# Patient Record
Sex: FEMALE | Race: WHITE | NOT HISPANIC OR LATINO
[De-identification: names, ages, dates, MRNs, and addresses within clinical notes are randomized per-mention and may not be internally consistent; named-entity substitution may affect disease eponyms.]

---

## 2016-10-11 RX ORDER — ASPIRIN/CALCIUM CARB/MAGNESIUM 324 MG
1 TABLET ORAL
Qty: 0 | Refills: 0 | COMMUNITY
Start: 2016-10-11

## 2016-10-11 RX ORDER — IPRATROPIUM/ALBUTEROL SULFATE 18-103MCG
1 AEROSOL WITH ADAPTER (GRAM) INHALATION
Qty: 0 | Refills: 0 | COMMUNITY
Start: 2016-10-11

## 2017-04-19 ENCOUNTER — APPOINTMENT (OUTPATIENT)
Dept: HOME HEALTH SERVICES | Facility: HOME HEALTH | Age: 82
End: 2017-04-19

## 2017-04-19 VITALS
OXYGEN SATURATION: 94 % | WEIGHT: 167 LBS | DIASTOLIC BLOOD PRESSURE: 80 MMHG | RESPIRATION RATE: 16 BRPM | TEMPERATURE: 97.3 F | BODY MASS INDEX: 33.67 KG/M2 | HEIGHT: 59 IN | SYSTOLIC BLOOD PRESSURE: 200 MMHG | HEART RATE: 82 BPM

## 2017-04-19 DIAGNOSIS — Z99.3 DEPENDENCE ON WHEELCHAIR: ICD-10-CM

## 2017-04-19 DIAGNOSIS — Z99.81 DEPENDENCE ON SUPPLEMENTAL OXYGEN: ICD-10-CM

## 2017-04-19 DIAGNOSIS — R04.89 HEMORRHAGE FROM OTHER SITES IN RESPIRATORY PASSAGES: ICD-10-CM

## 2017-04-19 DIAGNOSIS — K21.9 GASTRO-ESOPHAGEAL REFLUX DISEASE W/OUT ESOPHAGITIS: ICD-10-CM

## 2017-04-19 DIAGNOSIS — I25.10 ATHEROSCLEROTIC HEART DISEASE OF NATIVE CORONARY ARTERY W/OUT ANGINA PECTORIS: ICD-10-CM

## 2017-04-19 DIAGNOSIS — Z87.891 PERSONAL HISTORY OF NICOTINE DEPENDENCE: ICD-10-CM

## 2017-04-19 RX ORDER — CEFPODOXIME PROXETIL 200 MG/1
200 TABLET, FILM COATED ORAL
Qty: 5 | Refills: 0 | Status: COMPLETED | COMMUNITY
Start: 2017-04-15

## 2017-04-19 RX ORDER — PREDNISONE 10 MG/1
10 TABLET ORAL
Qty: 30 | Refills: 0 | Status: DISCONTINUED | COMMUNITY
Start: 2017-04-15

## 2017-04-19 RX ORDER — AMLODIPINE BESYLATE 5 MG/1
5 TABLET ORAL
Qty: 30 | Refills: 0 | Status: COMPLETED | COMMUNITY
Start: 2017-04-15

## 2017-04-19 RX ORDER — WARFARIN 3 MG/1
3 TABLET ORAL
Qty: 30 | Refills: 0 | Status: DISCONTINUED | COMMUNITY
Start: 2017-01-06

## 2017-04-19 RX ORDER — GUAIFENESIN 100 MG/5ML
100 SOLUTION ORAL
Qty: 120 | Refills: 0 | Status: DISCONTINUED | COMMUNITY
Start: 2017-04-15

## 2017-04-19 RX ORDER — IPRATROPIUM BROMIDE AND ALBUTEROL SULFATE 2.5; .5 MG/3ML; MG/3ML
0.5-2.5 (3) SOLUTION RESPIRATORY (INHALATION)
Qty: 270 | Refills: 0 | Status: COMPLETED | COMMUNITY
Start: 2017-01-06

## 2017-04-20 ENCOUNTER — LABORATORY RESULT (OUTPATIENT)
Age: 82
End: 2017-04-20

## 2017-04-20 PROBLEM — K21.9 GASTROESOPHAGEAL REFLUX DISEASE, ESOPHAGITIS PRESENCE NOT SPECIFIED: Status: ACTIVE | Noted: 2017-04-19

## 2017-04-20 PROBLEM — I25.10 CAD, MULTIPLE VESSEL: Status: ACTIVE | Noted: 2017-04-19

## 2017-04-24 ENCOUNTER — RESULT REVIEW (OUTPATIENT)
Age: 82
End: 2017-04-24

## 2017-05-04 ENCOUNTER — LABORATORY RESULT (OUTPATIENT)
Age: 82
End: 2017-05-04

## 2017-05-08 RX ORDER — BLOOD-GLUCOSE METER
W/DEVICE EACH MISCELLANEOUS
Qty: 1 | Refills: 0 | Status: ACTIVE | COMMUNITY
Start: 2017-05-08 | End: 1900-01-01

## 2017-05-09 ENCOUNTER — MEDICATION RENEWAL (OUTPATIENT)
Age: 82
End: 2017-05-09

## 2017-05-09 ENCOUNTER — RX RENEWAL (OUTPATIENT)
Age: 82
End: 2017-05-09

## 2017-05-15 ENCOUNTER — LABORATORY RESULT (OUTPATIENT)
Age: 82
End: 2017-05-15

## 2017-05-15 ENCOUNTER — CLINICAL ADVICE (OUTPATIENT)
Age: 82
End: 2017-05-15

## 2017-05-23 ENCOUNTER — RX RENEWAL (OUTPATIENT)
Age: 82
End: 2017-05-23

## 2017-05-25 LAB
ANION GAP SERPL CALC-SCNC: 17 MMOL/L
BUN SERPL-MCNC: 12 MG/DL
CALCIUM SERPL-MCNC: 9.2 MG/DL
CHLORIDE SERPL-SCNC: 96 MMOL/L
CO2 SERPL-SCNC: 26 MMOL/L
CREAT SERPL-MCNC: 1.25 MG/DL
GLUCOSE SERPL-MCNC: 183 MG/DL
INR PPP: 1.72 RATIO
POTASSIUM SERPL-SCNC: 3.8 MMOL/L
PT BLD: 19.6 SEC
SODIUM SERPL-SCNC: 139 MMOL/L

## 2017-05-26 ENCOUNTER — APPOINTMENT (OUTPATIENT)
Dept: HOME HEALTH SERVICES | Facility: HOME HEALTH | Age: 82
End: 2017-05-26

## 2017-05-26 VITALS
RESPIRATION RATE: 16 BRPM | SYSTOLIC BLOOD PRESSURE: 132 MMHG | DIASTOLIC BLOOD PRESSURE: 82 MMHG | OXYGEN SATURATION: 92 % | HEART RATE: 87 BPM | TEMPERATURE: 97.7 F

## 2017-05-26 DIAGNOSIS — E03.9 HYPOTHYROIDISM, UNSPECIFIED: ICD-10-CM

## 2017-05-26 DIAGNOSIS — E78.5 HYPERLIPIDEMIA, UNSPECIFIED: ICD-10-CM

## 2017-06-01 ENCOUNTER — LABORATORY RESULT (OUTPATIENT)
Age: 82
End: 2017-06-01

## 2017-06-15 ENCOUNTER — LABORATORY RESULT (OUTPATIENT)
Age: 82
End: 2017-06-15

## 2017-06-17 ENCOUNTER — RX RENEWAL (OUTPATIENT)
Age: 82
End: 2017-06-17

## 2017-06-29 ENCOUNTER — MEDICATION RENEWAL (OUTPATIENT)
Age: 82
End: 2017-06-29

## 2017-06-30 LAB
INR PPP: 1.84 RATIO
PT BLD: 21.1 SEC

## 2017-07-11 ENCOUNTER — APPOINTMENT (OUTPATIENT)
Dept: HOME HEALTH SERVICES | Facility: HOME HEALTH | Age: 82
End: 2017-07-11

## 2017-07-11 VITALS
SYSTOLIC BLOOD PRESSURE: 130 MMHG | RESPIRATION RATE: 18 BRPM | DIASTOLIC BLOOD PRESSURE: 80 MMHG | OXYGEN SATURATION: 86 % | TEMPERATURE: 98.9 F | HEART RATE: 92 BPM

## 2017-07-11 RX ORDER — LANCETS
EACH MISCELLANEOUS
Qty: 180 | Refills: 5 | Status: DISCONTINUED | COMMUNITY
Start: 2017-05-09 | End: 2017-07-11

## 2017-07-11 RX ORDER — LANCETS 33 GAUGE
EACH MISCELLANEOUS
Qty: 4 | Refills: 11 | Status: ACTIVE | COMMUNITY
Start: 2017-07-11 | End: 1900-01-01

## 2017-07-11 RX ORDER — LANCETS
EACH MISCELLANEOUS
Qty: 120 | Refills: 11 | Status: DISCONTINUED | COMMUNITY
Start: 2017-05-12 | End: 2017-07-11

## 2017-07-11 RX ORDER — BLOOD SUGAR DIAGNOSTIC
STRIP MISCELLANEOUS
Qty: 4 | Refills: 11 | Status: ACTIVE | COMMUNITY
Start: 2017-05-09 | End: 1900-01-01

## 2017-07-14 ENCOUNTER — MEDICATION RENEWAL (OUTPATIENT)
Age: 82
End: 2017-07-14

## 2017-07-14 LAB
INR PPP: 3.32 RATIO
PT BLD: 38.4 SEC

## 2017-07-27 ENCOUNTER — LABORATORY RESULT (OUTPATIENT)
Age: 82
End: 2017-07-27

## 2017-07-28 ENCOUNTER — APPOINTMENT (OUTPATIENT)
Dept: HOME HEALTH SERVICES | Facility: HOME HEALTH | Age: 82
End: 2017-07-28
Payer: MEDICARE

## 2017-07-28 ENCOUNTER — RX RENEWAL (OUTPATIENT)
Age: 82
End: 2017-07-28

## 2017-07-28 VITALS
TEMPERATURE: 98.5 F | RESPIRATION RATE: 20 BRPM | DIASTOLIC BLOOD PRESSURE: 72 MMHG | HEART RATE: 83 BPM | OXYGEN SATURATION: 81 % | SYSTOLIC BLOOD PRESSURE: 142 MMHG

## 2017-07-28 VITALS — OXYGEN SATURATION: 92 %

## 2017-07-28 DIAGNOSIS — J44.1 CHRONIC OBSTRUCTIVE PULMONARY DISEASE WITH (ACUTE) EXACERBATION: ICD-10-CM

## 2017-07-28 PROCEDURE — 99349 HOME/RES VST EST MOD MDM 40: CPT

## 2017-08-02 ENCOUNTER — LABORATORY RESULT (OUTPATIENT)
Age: 82
End: 2017-08-02

## 2017-08-10 ENCOUNTER — LABORATORY RESULT (OUTPATIENT)
Age: 82
End: 2017-08-10

## 2017-09-07 LAB
INR PPP: 2.91 RATIO
PT BLD: 33.6 SEC

## 2017-09-19 ENCOUNTER — APPOINTMENT (OUTPATIENT)
Dept: HOME HEALTH SERVICES | Facility: HOME HEALTH | Age: 82
End: 2017-09-19
Payer: MEDICARE

## 2017-09-19 VITALS
OXYGEN SATURATION: 97 % | SYSTOLIC BLOOD PRESSURE: 142 MMHG | DIASTOLIC BLOOD PRESSURE: 72 MMHG | TEMPERATURE: 98 F | HEART RATE: 76 BPM | RESPIRATION RATE: 18 BRPM

## 2017-09-19 DIAGNOSIS — R39.81 FUNCTIONAL URINARY INCONTINENCE: ICD-10-CM

## 2017-09-19 PROCEDURE — 99350 HOME/RES VST EST HIGH MDM 60: CPT

## 2017-09-19 RX ORDER — ISOPROPYL ALCOHOL 70 %
TOWELETTE (EA) MISCELLANEOUS
Qty: 1 | Refills: 11 | Status: ACTIVE | COMMUNITY
Start: 2017-05-08

## 2017-09-27 ENCOUNTER — MOBILE ON CALL (OUTPATIENT)
Age: 82
End: 2017-09-27

## 2017-09-27 LAB
INR PPP: 3.25 RATIO
PT BLD: 37.6 SEC

## 2017-09-28 ENCOUNTER — CLINICAL ADVICE (OUTPATIENT)
Age: 82
End: 2017-09-28

## 2017-09-28 DIAGNOSIS — R19.7 DIARRHEA, UNSPECIFIED: ICD-10-CM

## 2017-10-04 ENCOUNTER — INPATIENT (INPATIENT)
Facility: HOSPITAL | Age: 82
LOS: 5 days | Discharge: HOME | DRG: 291 | End: 2017-10-10
Attending: INTERNAL MEDICINE | Admitting: HOSPITALIST
Payer: MEDICARE

## 2017-10-04 ENCOUNTER — CLINICAL ADVICE (OUTPATIENT)
Age: 82
End: 2017-10-04

## 2017-10-04 VITALS
SYSTOLIC BLOOD PRESSURE: 156 MMHG | HEIGHT: 59 IN | HEART RATE: 117 BPM | DIASTOLIC BLOOD PRESSURE: 124 MMHG | TEMPERATURE: 100 F | OXYGEN SATURATION: 86 % | WEIGHT: 167.99 LBS | RESPIRATION RATE: 24 BRPM

## 2017-10-04 LAB
ALBUMIN SERPL ELPH-MCNC: 3.1 G/DL — LOW (ref 3.3–5)
ALP SERPL-CCNC: 77 U/L — SIGNIFICANT CHANGE UP (ref 40–120)
ALT FLD-CCNC: 11 U/L — LOW (ref 12–78)
AMYLASE P1 CFR SERPL: 40 U/L — SIGNIFICANT CHANGE UP (ref 25–115)
ANION GAP SERPL CALC-SCNC: 10 MMOL/L — SIGNIFICANT CHANGE UP (ref 5–17)
APPEARANCE UR: CLEAR — SIGNIFICANT CHANGE UP
APTT BLD: 40.1 SEC — HIGH (ref 27.5–37.4)
AST SERPL-CCNC: 14 U/L — LOW (ref 15–37)
BASE EXCESS BLDA CALC-SCNC: 1.3 MMOL/L — SIGNIFICANT CHANGE UP (ref -2–2)
BASOPHILS # BLD AUTO: 0.1 K/UL — SIGNIFICANT CHANGE UP (ref 0–0.2)
BASOPHILS NFR BLD AUTO: 1 % — SIGNIFICANT CHANGE UP (ref 0–2)
BILIRUB SERPL-MCNC: 0.7 MG/DL — SIGNIFICANT CHANGE UP (ref 0.2–1.2)
BILIRUB UR-MCNC: NEGATIVE — SIGNIFICANT CHANGE UP
BLOOD GAS COMMENTS ARTERIAL: SIGNIFICANT CHANGE UP
BLOOD GAS COMMENTS ARTERIAL: SIGNIFICANT CHANGE UP
BUN SERPL-MCNC: 12 MG/DL — SIGNIFICANT CHANGE UP (ref 7–23)
CALCIUM SERPL-MCNC: 8.5 MG/DL — SIGNIFICANT CHANGE UP (ref 8.5–10.1)
CHLORIDE SERPL-SCNC: 105 MMOL/L — SIGNIFICANT CHANGE UP (ref 96–108)
CK MB BLD-MCNC: 1.5 % — SIGNIFICANT CHANGE UP (ref 0–3.5)
CK MB CFR SERPL CALC: 0.8 NG/ML — SIGNIFICANT CHANGE UP (ref 0–3.6)
CK SERPL-CCNC: 55 U/L — SIGNIFICANT CHANGE UP (ref 26–192)
CO2 SERPL-SCNC: 28 MMOL/L — SIGNIFICANT CHANGE UP (ref 22–31)
COLOR SPEC: YELLOW — SIGNIFICANT CHANGE UP
CREAT SERPL-MCNC: 1 MG/DL — SIGNIFICANT CHANGE UP (ref 0.5–1.3)
DIFF PNL FLD: NEGATIVE — SIGNIFICANT CHANGE UP
EOSINOPHIL # BLD AUTO: 0.3 K/UL — SIGNIFICANT CHANGE UP (ref 0–0.5)
EOSINOPHIL NFR BLD AUTO: 3.4 % — SIGNIFICANT CHANGE UP (ref 0–6)
GLUCOSE SERPL-MCNC: 111 MG/DL — HIGH (ref 70–99)
GLUCOSE UR QL: NEGATIVE — SIGNIFICANT CHANGE UP
HCO3 BLDA-SCNC: 26 MMOL/L — SIGNIFICANT CHANGE UP (ref 23–27)
HCT VFR BLD CALC: 43.1 % — SIGNIFICANT CHANGE UP (ref 34.5–45)
HGB BLD-MCNC: 13.5 G/DL — SIGNIFICANT CHANGE UP (ref 11.5–15.5)
HOROWITZ INDEX BLDA+IHG-RTO: 40 — SIGNIFICANT CHANGE UP
INR BLD: 4.1 RATIO — HIGH (ref 0.88–1.16)
KETONES UR-MCNC: NEGATIVE — SIGNIFICANT CHANGE UP
LACTATE SERPL-SCNC: 1.5 MMOL/L — SIGNIFICANT CHANGE UP (ref 0.7–2)
LACTATE SERPL-SCNC: 3.6 MMOL/L — HIGH (ref 0.7–2)
LEUKOCYTE ESTERASE UR-ACNC: ABNORMAL
LIDOCAIN IGE QN: 87 U/L — SIGNIFICANT CHANGE UP (ref 73–393)
LYMPHOCYTES # BLD AUTO: 1.5 K/UL — SIGNIFICANT CHANGE UP (ref 1–3.3)
LYMPHOCYTES # BLD AUTO: 19.8 % — SIGNIFICANT CHANGE UP (ref 13–44)
MCHC RBC-ENTMCNC: 28.7 PG — SIGNIFICANT CHANGE UP (ref 27–34)
MCHC RBC-ENTMCNC: 31.3 GM/DL — LOW (ref 32–36)
MCV RBC AUTO: 91.7 FL — SIGNIFICANT CHANGE UP (ref 80–100)
MONOCYTES # BLD AUTO: 0.6 K/UL — SIGNIFICANT CHANGE UP (ref 0–0.9)
MONOCYTES NFR BLD AUTO: 7.2 % — SIGNIFICANT CHANGE UP (ref 1–9)
NEUTROPHILS # BLD AUTO: 5.4 K/UL — SIGNIFICANT CHANGE UP (ref 1.8–7.4)
NEUTROPHILS NFR BLD AUTO: 68.5 % — SIGNIFICANT CHANGE UP (ref 43–77)
NITRITE UR-MCNC: NEGATIVE — SIGNIFICANT CHANGE UP
NT-PROBNP SERPL-SCNC: 1269 PG/ML — HIGH (ref 0–450)
PCO2 BLDA: 33 MMHG — SIGNIFICANT CHANGE UP (ref 32–46)
PH BLDA: 7.48 — HIGH (ref 7.35–7.45)
PH UR: 5 — SIGNIFICANT CHANGE UP (ref 5–8)
PLATELET # BLD AUTO: 209 K/UL — SIGNIFICANT CHANGE UP (ref 150–400)
PO2 BLDA: 101 MMHG — SIGNIFICANT CHANGE UP (ref 74–108)
POTASSIUM SERPL-MCNC: 3.6 MMOL/L — SIGNIFICANT CHANGE UP (ref 3.5–5.3)
POTASSIUM SERPL-SCNC: 3.6 MMOL/L — SIGNIFICANT CHANGE UP (ref 3.5–5.3)
PROCALCITONIN SERPL-MCNC: <0.05 — SIGNIFICANT CHANGE UP (ref 0–0.04)
PROT SERPL-MCNC: 7.4 G/DL — SIGNIFICANT CHANGE UP (ref 6–8.3)
PROT UR-MCNC: NEGATIVE — SIGNIFICANT CHANGE UP
PROTHROM AB SERPL-ACNC: 46 SEC — HIGH (ref 9.8–12.7)
RBC # BLD: 4.7 M/UL — SIGNIFICANT CHANGE UP (ref 3.8–5.2)
RBC # FLD: 14.9 % — HIGH (ref 10.3–14.5)
SAO2 % BLDA: 98 % — HIGH (ref 92–96)
SODIUM SERPL-SCNC: 143 MMOL/L — SIGNIFICANT CHANGE UP (ref 135–145)
SP GR SPEC: 1.01 — SIGNIFICANT CHANGE UP (ref 1.01–1.02)
TROPONIN I SERPL-MCNC: 0.19 NG/ML — HIGH (ref 0.01–0.04)
UROBILINOGEN FLD QL: NEGATIVE — SIGNIFICANT CHANGE UP
WBC # BLD: 7.8 K/UL — SIGNIFICANT CHANGE UP (ref 3.8–10.5)
WBC # FLD AUTO: 7.8 K/UL — SIGNIFICANT CHANGE UP (ref 3.8–10.5)

## 2017-10-04 PROCEDURE — 71010: CPT | Mod: 26

## 2017-10-04 PROCEDURE — 99281 EMR DPT VST MAYX REQ PHY/QHP: CPT

## 2017-10-04 PROCEDURE — 93010 ELECTROCARDIOGRAM REPORT: CPT

## 2017-10-04 PROCEDURE — 99223 1ST HOSP IP/OBS HIGH 75: CPT | Mod: AI,GC

## 2017-10-04 RX ORDER — IPRATROPIUM/ALBUTEROL SULFATE 18-103MCG
3 AEROSOL WITH ADAPTER (GRAM) INHALATION ONCE
Qty: 0 | Refills: 0 | Status: COMPLETED | OUTPATIENT
Start: 2017-10-04 | End: 2017-10-04

## 2017-10-04 RX ORDER — METOPROLOL TARTRATE 50 MG
5 TABLET ORAL ONCE
Qty: 0 | Refills: 0 | Status: COMPLETED | OUTPATIENT
Start: 2017-10-04 | End: 2017-10-04

## 2017-10-04 RX ORDER — ASPIRIN/CALCIUM CARB/MAGNESIUM 324 MG
325 TABLET ORAL ONCE
Qty: 0 | Refills: 0 | Status: COMPLETED | OUTPATIENT
Start: 2017-10-04 | End: 2017-10-04

## 2017-10-04 RX ORDER — SODIUM CHLORIDE 9 MG/ML
500 INJECTION INTRAMUSCULAR; INTRAVENOUS; SUBCUTANEOUS ONCE
Qty: 0 | Refills: 0 | Status: COMPLETED | OUTPATIENT
Start: 2017-10-04 | End: 2017-10-04

## 2017-10-04 RX ORDER — FUROSEMIDE 40 MG
40 TABLET ORAL ONCE
Qty: 0 | Refills: 0 | Status: COMPLETED | OUTPATIENT
Start: 2017-10-04 | End: 2017-10-04

## 2017-10-04 RX ADMIN — Medication 5 MILLIGRAM(S): at 22:15

## 2017-10-04 RX ADMIN — Medication 125 MILLIGRAM(S): at 22:31

## 2017-10-04 RX ADMIN — Medication 0.5 MILLIGRAM(S): at 21:40

## 2017-10-04 RX ADMIN — Medication 0.5 MILLIGRAM(S): at 21:53

## 2017-10-04 RX ADMIN — Medication 3 MILLILITER(S): at 21:00

## 2017-10-04 RX ADMIN — Medication 40 MILLIGRAM(S): at 20:50

## 2017-10-04 RX ADMIN — SODIUM CHLORIDE 500 MILLILITER(S): 9 INJECTION INTRAMUSCULAR; INTRAVENOUS; SUBCUTANEOUS at 22:19

## 2017-10-04 RX ADMIN — Medication 325 MILLIGRAM(S): at 23:46

## 2017-10-04 NOTE — ED ADULT NURSE REASSESSMENT NOTE - NS ED NURSE REASSESS COMMENT FT1
pt came to ED SOB x days.   [id number] bilateral pedal edema present.   pt placed on bipap 10/5 40% continuous placed by respiratory STAT

## 2017-10-04 NOTE — ED PROVIDER NOTE - MEDICAL DECISION MAKING DETAILS
screening septic work up, BNP, INR, cardiac enzymes, d dimer, procalcitonin, lactate, ABG, UA, urine culture, EKG

## 2017-10-04 NOTE — ED PROVIDER NOTE - PMH
DVT (deep venous thrombosis)    Emphysema COPD (chronic obstructive pulmonary disease)    DVT (deep venous thrombosis)    Emphysema    HLD (hyperlipidemia)    HTN (hypertension)    Insulin dependent diabetes mellitus

## 2017-10-04 NOTE — H&P ADULT - FAMILY HISTORY
No pertinent family history in first degree relatives Father  Still living? Unknown  Family history of acute myocardial infarction, Age at diagnosis: Age Unknown

## 2017-10-04 NOTE — H&P ADULT - ASSESSMENT
This is an 81 y.o. 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 Admitted for ARDS, COPD exacerbation, acute CHF

## 2017-10-04 NOTE — H&P ADULT - NSHPREVIEWOFSYSTEMS_GEN_ALL_CORE
Constitutional: denies fever, chills, diaphoresis   HEENT: denies blurry vision, difficulty hearing  Respiratory: admits SOB, HIDALGO,  denies cough, sputum production  Cardiovascular: denies CP, palpitations, admits edema  Gastrointestinal: denies nausea, vomiting, diarrhea, constipation, abdominal pain, melena, hematochezia   Genitourinary: denies dysuria, frequency, urgency, hematuria   Skin/Breast: denies rash, itching  Musculoskeletal: denies myalgias, joint swelling, muscle weakness  Neurologic: admits weakness, denies headache, dizziness, paresthesias, numbness/tingling

## 2017-10-04 NOTE — H&P ADULT - NSHPPHYSICALEXAM_GEN_ALL_CORE
vitals were Temp: 99.8F   /124 RR 24  SpO2: 86% RA  Physical Exam:  General: currently on Bipap 40% O2, elderly white male, NAD  HEENT: NCAT, PERRLA, EOMI bl, moist mucous membranes   Neck: Supple, nontender, no mass  Neurology: A&Ox3  Respiratory: ELEN rhonchi, no coughing, currently on BiPap  CV: tachycardic, no murmurs, rubs or gallops  Abdominal: Soft, NT, ND +BSx4  Extremities: 2+ pitting edema b/l LE, + peripheral pulses  MSK: Normal ROM, no joint erythema or warmth, no joint swelling   Skin: warm, dry, normal color, no rash or abnormal lesions

## 2017-10-04 NOTE — H&P ADULT - NSHPSOCIALHISTORY_GEN_ALL_CORE
Former smoker, quit 2-3 years ago, used to smoke 1 pk/day for 60 years, denies ETOH use, illict drug use. Ambulates with assistance and lives with son

## 2017-10-04 NOTE — H&P ADULT - PROBLEM SELECTOR PLAN 8
H/o DVT x2, currently on warfarin at home but is supra therapeutic in hospital. Last dose was 10/3 PM.

## 2017-10-04 NOTE — ED PROVIDER NOTE - CARE PLAN
Principal Discharge DX:	Respiratory distress  Instructions for follow-up, activity and diet:	admit  Secondary Diagnosis:	COPD exacerbation  Secondary Diagnosis:	Congestive heart failure, unspecified congestive heart failure chronicity, unspecified congestive heart failure type

## 2017-10-04 NOTE — H&P ADULT - PROBLEM SELECTOR PLAN 1
-ADMIT to Tele  -Currently on Bipap, saturating well. Patient uses 3 L NC at home  -Duonebs q8  -Solum medrol  -Pulm consult (shilpa) -ADMIT to Tele  -Currently on BiPAP, saturating well. Patient uses 3 L NC at home  -Duoneb q8 prn  -Solum medrol 40 Q8  -Pulm consult (Williams)

## 2017-10-04 NOTE — H&P ADULT - ATTENDING COMMENTS
80 yo f pmh stated above being admitted for Respiratory distress, fever of unknown origin.  Continue BIPAP.  Pulmonary Dr. Kramer consulted.  Work up fever with RVP swab.  F/U cultures.  Pt currently feeling comfortable.  F/U echo.  Consider Cardiology consult

## 2017-10-04 NOTE — ED PROVIDER NOTE - OBJECTIVE STATEMENT
Pt is a 82 yo female who presents to the ED with a cc of SOB.  PMHx of DVT on Coumadin, COPD, HTN, HLD, DM, hypothyroidism, GERD.  ECHO from 7/16 shows EF of 60% with mild left ventricular hypertrophy.  Pt reports that for the last week and a half she has been experiencing SOB which has been progressively worsening.  Pt has been sleeping in a recliner in a sitting because laying flat significantly worsens her breathing.  Pt is on oxygen at 3 liters NC continuous.  She is followed by home care who started steroids approx 1 1/2 weeks ago and her Lasix was increased to 40 mg.  Initially there was a mild improvement but then over the last 3 days symptoms again significantly worsened.   She had a chest x-ray preformed at home which per reports showed congestive changes.  Denies fever, chills, N/V/D/C, CP, cough, abd pain.  Pt is experiencing ext swelling.

## 2017-10-04 NOTE — H&P ADULT - HISTORY OF PRESENT ILLNESS
In the ED, vitals were Temp: 99.8F   /124 RR 24  SpO2: 86% RA. Sig labs include WBC 7.8, INR 4.10, PTT 40.1, PT 46, lactate 3.6, glucose 111, albumin 3.1,procalc <0.05, trop 0.192, pro BNP 1269. ABG showed pH 7.48, pCO2 33, HCO3 26. UA showed trace leuk est, neg nitrate. CXR ordered. EKG ordered. Patient put on BiPap. Gave solu Medrol 125 mg IV one dose, metoprolol 5 mg IV, 1 L IVF NS bolus, Duonebs x1. This is an 81 y.o. 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 She states it started one week prior when she had non bloody diarrhea for two days. THe dyspnea has progressively getting worse since then. Patient uses 3 L NC O2 at home. She tried using Duoneb x3 at home but it did not help, nor did the 3L of O2. Patient denies any fever, chills,  n/v, palpitations, diaphoresis. She also admits to increased swelling of both her feet and increased lethargy today.    In the ED, vitals were Temp: 99.8F   /124 RR 24  SpO2: 86% RA. Sig labs include WBC 7.8, INR 4.10, PTT 40.1, PT 46, lactate 3.6, glucose 111, albumin 3.1,procalc <0.05, trop 0.192, pro BNP 1269. ABG showed pH 7.48, pCO2 33, HCO3 26. UA showed trace leuk est, neg nitrate. CXR ordered. EKG ordered. Patient put on BiPap. Gave solu Medrol 125 mg IV one dose, metoprolol 5 mg IV, 1 L IVF NS bolus, Duonebs x1.

## 2017-10-04 NOTE — ED PROVIDER NOTE - SECONDARY DIAGNOSIS.
COPD exacerbation Congestive heart failure, unspecified congestive heart failure chronicity, unspecified congestive heart failure type

## 2017-10-04 NOTE — H&P ADULT - PROBLEM SELECTOR PLAN 3
-Last TTE in July 2016 showed LV systolic function EF 60%, Aortic sclerosis, Mitral annular calcifications with moderate mitral regurgitation    -Continue lasix for peripheral edema -Last TTE in July 2016 showed LV systolic function EF 60%, Aortic sclerosis, Mitral annular calcifications with moderate mitral regurgitation    -Order TTE   -[facility] consult  -Continue lasix for peripheral edema -Last TTE in July 2016 showed LV systolic function EF 60%, Aortic sclerosis, Mitral annular calcifications with moderate mitral regurgitation    -Order TTE   -[facility] Cardio consult  -Continue lasix for peripheral edema

## 2017-10-04 NOTE — H&P ADULT - PROBLEM SELECTOR PLAN 2
-Seema q8  -Solum medrol  -Pulm consult (shilpa) -Duonebs q8  -Solum medrol 40 Q8  -Pulm consult (shilpa) -Duonegenaro q8  -Solum medrol 40 Q8  -Pulm consult (janelle)

## 2017-10-05 ENCOUNTER — TRANSCRIPTION ENCOUNTER (OUTPATIENT)
Age: 82
End: 2017-10-05

## 2017-10-05 DIAGNOSIS — R06.00 DYSPNEA, UNSPECIFIED: ICD-10-CM

## 2017-10-05 DIAGNOSIS — I82.409 ACUTE EMBOLISM AND THROMBOSIS OF UNSPECIFIED DEEP VEINS OF UNSPECIFIED LOWER EXTREMITY: ICD-10-CM

## 2017-10-05 DIAGNOSIS — J44.1 CHRONIC OBSTRUCTIVE PULMONARY DISEASE WITH (ACUTE) EXACERBATION: ICD-10-CM

## 2017-10-05 DIAGNOSIS — I10 ESSENTIAL (PRIMARY) HYPERTENSION: ICD-10-CM

## 2017-10-05 DIAGNOSIS — E78.5 HYPERLIPIDEMIA, UNSPECIFIED: ICD-10-CM

## 2017-10-05 DIAGNOSIS — J96.00 ACUTE RESPIRATORY FAILURE, UNSPECIFIED WHETHER WITH HYPOXIA OR HYPERCAPNIA: ICD-10-CM

## 2017-10-05 DIAGNOSIS — E11.9 TYPE 2 DIABETES MELLITUS WITHOUT COMPLICATIONS: ICD-10-CM

## 2017-10-05 DIAGNOSIS — Z29.9 ENCOUNTER FOR PROPHYLACTIC MEASURES, UNSPECIFIED: ICD-10-CM

## 2017-10-05 DIAGNOSIS — I50.9 HEART FAILURE, UNSPECIFIED: ICD-10-CM

## 2017-10-05 DIAGNOSIS — R74.8 ABNORMAL LEVELS OF OTHER SERUM ENZYMES: ICD-10-CM

## 2017-10-05 LAB
ANION GAP SERPL CALC-SCNC: 10 MMOL/L — SIGNIFICANT CHANGE UP (ref 5–17)
BUN SERPL-MCNC: 15 MG/DL — SIGNIFICANT CHANGE UP (ref 7–23)
CALCIUM SERPL-MCNC: 8.2 MG/DL — LOW (ref 8.5–10.1)
CHLORIDE SERPL-SCNC: 105 MMOL/L — SIGNIFICANT CHANGE UP (ref 96–108)
CO2 SERPL-SCNC: 27 MMOL/L — SIGNIFICANT CHANGE UP (ref 22–31)
CREAT SERPL-MCNC: 1.2 MG/DL — SIGNIFICANT CHANGE UP (ref 0.5–1.3)
CULTURE RESULTS: NO GROWTH — SIGNIFICANT CHANGE UP
D DIMER BLD IA.RAPID-MCNC: 289 NG/ML DDU — HIGH
GLUCOSE SERPL-MCNC: 191 MG/DL — HIGH (ref 70–99)
HBA1C BLD-MCNC: 7.1 % — HIGH (ref 4–5.6)
HCT VFR BLD CALC: 36.4 % — SIGNIFICANT CHANGE UP (ref 34.5–45)
HGB BLD-MCNC: 11.4 G/DL — LOW (ref 11.5–15.5)
MCHC RBC-ENTMCNC: 28.6 PG — SIGNIFICANT CHANGE UP (ref 27–34)
MCHC RBC-ENTMCNC: 31.4 GM/DL — LOW (ref 32–36)
MCV RBC AUTO: 90.8 FL — SIGNIFICANT CHANGE UP (ref 80–100)
NT-PROBNP SERPL-SCNC: 1497 PG/ML — HIGH (ref 0–450)
PLATELET # BLD AUTO: 183 K/UL — SIGNIFICANT CHANGE UP (ref 150–400)
POTASSIUM SERPL-MCNC: 3.5 MMOL/L — SIGNIFICANT CHANGE UP (ref 3.5–5.3)
POTASSIUM SERPL-SCNC: 3.5 MMOL/L — SIGNIFICANT CHANGE UP (ref 3.5–5.3)
RAPID RVP RESULT: SIGNIFICANT CHANGE UP
RBC # BLD: 4.01 M/UL — SIGNIFICANT CHANGE UP (ref 3.8–5.2)
RBC # FLD: 14.6 % — HIGH (ref 10.3–14.5)
SODIUM SERPL-SCNC: 142 MMOL/L — SIGNIFICANT CHANGE UP (ref 135–145)
SPECIMEN SOURCE: SIGNIFICANT CHANGE UP
TROPONIN I SERPL-MCNC: 0.16 NG/ML — HIGH (ref 0.01–0.04)
WBC # BLD: 4.3 K/UL — SIGNIFICANT CHANGE UP (ref 3.8–10.5)
WBC # FLD AUTO: 4.3 K/UL — SIGNIFICANT CHANGE UP (ref 3.8–10.5)

## 2017-10-05 PROCEDURE — 99233 SBSQ HOSP IP/OBS HIGH 50: CPT | Mod: GC

## 2017-10-05 PROCEDURE — 99223 1ST HOSP IP/OBS HIGH 75: CPT

## 2017-10-05 RX ORDER — PANTOPRAZOLE SODIUM 20 MG/1
40 TABLET, DELAYED RELEASE ORAL
Qty: 0 | Refills: 0 | Status: DISCONTINUED | OUTPATIENT
Start: 2017-10-05 | End: 2017-10-10

## 2017-10-05 RX ORDER — ASPIRIN/CALCIUM CARB/MAGNESIUM 324 MG
81 TABLET ORAL DAILY
Qty: 0 | Refills: 0 | Status: DISCONTINUED | OUTPATIENT
Start: 2017-10-05 | End: 2017-10-10

## 2017-10-05 RX ORDER — ATORVASTATIN CALCIUM 80 MG/1
20 TABLET, FILM COATED ORAL AT BEDTIME
Qty: 0 | Refills: 0 | Status: DISCONTINUED | OUTPATIENT
Start: 2017-10-05 | End: 2017-10-10

## 2017-10-05 RX ORDER — FUROSEMIDE 40 MG
20 TABLET ORAL DAILY
Qty: 0 | Refills: 0 | Status: DISCONTINUED | OUTPATIENT
Start: 2017-10-05 | End: 2017-10-05

## 2017-10-05 RX ORDER — ONDANSETRON 8 MG/1
4 TABLET, FILM COATED ORAL EVERY 6 HOURS
Qty: 0 | Refills: 0 | Status: DISCONTINUED | OUTPATIENT
Start: 2017-10-05 | End: 2017-10-10

## 2017-10-05 RX ORDER — FUROSEMIDE 40 MG
40 TABLET ORAL DAILY
Qty: 0 | Refills: 0 | Status: DISCONTINUED | OUTPATIENT
Start: 2017-10-06 | End: 2017-10-09

## 2017-10-05 RX ORDER — DEXTROSE 50 % IN WATER 50 %
12.5 SYRINGE (ML) INTRAVENOUS ONCE
Qty: 0 | Refills: 0 | Status: DISCONTINUED | OUTPATIENT
Start: 2017-10-05 | End: 2017-10-10

## 2017-10-05 RX ORDER — ASPIRIN/CALCIUM CARB/MAGNESIUM 324 MG
1 TABLET ORAL
Qty: 0 | Refills: 0 | COMMUNITY

## 2017-10-05 RX ORDER — DEXTROSE 50 % IN WATER 50 %
1 SYRINGE (ML) INTRAVENOUS ONCE
Qty: 0 | Refills: 0 | Status: DISCONTINUED | OUTPATIENT
Start: 2017-10-05 | End: 2017-10-10

## 2017-10-05 RX ORDER — GLUCAGON INJECTION, SOLUTION 0.5 MG/.1ML
1 INJECTION, SOLUTION SUBCUTANEOUS ONCE
Qty: 0 | Refills: 0 | Status: DISCONTINUED | OUTPATIENT
Start: 2017-10-05 | End: 2017-10-10

## 2017-10-05 RX ORDER — SODIUM CHLORIDE 9 MG/ML
1000 INJECTION, SOLUTION INTRAVENOUS
Qty: 0 | Refills: 0 | Status: DISCONTINUED | OUTPATIENT
Start: 2017-10-05 | End: 2017-10-10

## 2017-10-05 RX ORDER — INSULIN LISPRO 100/ML
VIAL (ML) SUBCUTANEOUS
Qty: 0 | Refills: 0 | Status: DISCONTINUED | OUTPATIENT
Start: 2017-10-05 | End: 2017-10-09

## 2017-10-05 RX ORDER — FUROSEMIDE 40 MG
40 TABLET ORAL DAILY
Qty: 0 | Refills: 0 | Status: DISCONTINUED | OUTPATIENT
Start: 2017-10-05 | End: 2017-10-05

## 2017-10-05 RX ORDER — LEVOTHYROXINE SODIUM 125 MCG
50 TABLET ORAL DAILY
Qty: 0 | Refills: 0 | Status: DISCONTINUED | OUTPATIENT
Start: 2017-10-05 | End: 2017-10-10

## 2017-10-05 RX ORDER — BUDESONIDE, MICRONIZED 100 %
0.5 POWDER (GRAM) MISCELLANEOUS
Qty: 0 | Refills: 0 | Status: DISCONTINUED | OUTPATIENT
Start: 2017-10-05 | End: 2017-10-10

## 2017-10-05 RX ORDER — ACETAMINOPHEN 500 MG
325 TABLET ORAL ONCE
Qty: 0 | Refills: 0 | Status: DISCONTINUED | OUTPATIENT
Start: 2017-10-05 | End: 2017-10-05

## 2017-10-05 RX ORDER — METOPROLOL TARTRATE 50 MG
50 TABLET ORAL
Qty: 0 | Refills: 0 | Status: DISCONTINUED | OUTPATIENT
Start: 2017-10-05 | End: 2017-10-10

## 2017-10-05 RX ORDER — ACETAMINOPHEN 500 MG
650 TABLET ORAL ONCE
Qty: 0 | Refills: 0 | Status: COMPLETED | OUTPATIENT
Start: 2017-10-05 | End: 2017-10-05

## 2017-10-05 RX ORDER — INSULIN GLARGINE 100 [IU]/ML
30 INJECTION, SOLUTION SUBCUTANEOUS AT BEDTIME
Qty: 0 | Refills: 0 | Status: DISCONTINUED | OUTPATIENT
Start: 2017-10-05 | End: 2017-10-10

## 2017-10-05 RX ORDER — WARFARIN SODIUM 2.5 MG/1
1 TABLET ORAL
Qty: 0 | Refills: 0 | COMMUNITY

## 2017-10-05 RX ORDER — ACETAMINOPHEN 500 MG
650 TABLET ORAL EVERY 6 HOURS
Qty: 0 | Refills: 0 | Status: DISCONTINUED | OUTPATIENT
Start: 2017-10-05 | End: 2017-10-10

## 2017-10-05 RX ORDER — IPRATROPIUM/ALBUTEROL SULFATE 18-103MCG
3 AEROSOL WITH ADAPTER (GRAM) INHALATION
Qty: 0 | Refills: 0 | Status: DISCONTINUED | OUTPATIENT
Start: 2017-10-05 | End: 2017-10-10

## 2017-10-05 RX ORDER — IPRATROPIUM/ALBUTEROL SULFATE 18-103MCG
3 AEROSOL WITH ADAPTER (GRAM) INHALATION EVERY 8 HOURS
Qty: 0 | Refills: 0 | Status: DISCONTINUED | OUTPATIENT
Start: 2017-10-05 | End: 2017-10-10

## 2017-10-05 RX ORDER — AMLODIPINE BESYLATE 2.5 MG/1
5 TABLET ORAL DAILY
Qty: 0 | Refills: 0 | Status: DISCONTINUED | OUTPATIENT
Start: 2017-10-05 | End: 2017-10-10

## 2017-10-05 RX ADMIN — ATORVASTATIN CALCIUM 20 MILLIGRAM(S): 80 TABLET, FILM COATED ORAL at 21:47

## 2017-10-05 RX ADMIN — INSULIN GLARGINE 30 UNIT(S): 100 INJECTION, SOLUTION SUBCUTANEOUS at 02:38

## 2017-10-05 RX ADMIN — Medication 3 MILLILITER(S): at 11:25

## 2017-10-05 RX ADMIN — Medication 0.5 MILLIGRAM(S): at 20:21

## 2017-10-05 RX ADMIN — Medication 50 MICROGRAM(S): at 05:45

## 2017-10-05 RX ADMIN — Medication 40 MILLIGRAM(S): at 21:48

## 2017-10-05 RX ADMIN — Medication 40 MILLIGRAM(S): at 05:45

## 2017-10-05 RX ADMIN — INSULIN GLARGINE 30 UNIT(S): 100 INJECTION, SOLUTION SUBCUTANEOUS at 21:47

## 2017-10-05 RX ADMIN — Medication 1: at 08:49

## 2017-10-05 RX ADMIN — AMLODIPINE BESYLATE 5 MILLIGRAM(S): 2.5 TABLET ORAL at 05:45

## 2017-10-05 RX ADMIN — Medication 81 MILLIGRAM(S): at 11:29

## 2017-10-05 RX ADMIN — Medication 20 MILLIGRAM(S): at 05:45

## 2017-10-05 RX ADMIN — Medication 3 MILLILITER(S): at 20:21

## 2017-10-05 RX ADMIN — PANTOPRAZOLE SODIUM 40 MILLIGRAM(S): 20 TABLET, DELAYED RELEASE ORAL at 08:51

## 2017-10-05 RX ADMIN — Medication 650 MILLIGRAM(S): at 00:45

## 2017-10-05 RX ADMIN — Medication 40 MILLIGRAM(S): at 13:18

## 2017-10-05 RX ADMIN — Medication 50 MILLIGRAM(S): at 19:04

## 2017-10-05 RX ADMIN — Medication 2: at 13:13

## 2017-10-05 RX ADMIN — Medication 50 MILLIGRAM(S): at 05:45

## 2017-10-05 RX ADMIN — Medication 3 MILLILITER(S): at 16:24

## 2017-10-05 NOTE — DISCHARGE NOTE ADULT - MEDICATION SUMMARY - MEDICATIONS TO TAKE
I will START or STAY ON the medications listed below when I get home from the hospital:    budesonide 0.5 mg/2 mL inhalation suspension  -- 1 puff(s) inhaled 2 times a day  -- Indication: For COPD (chronic obstructive pulmonary disease)    Pulmicort Flexhaler 90 mcg/inh inhalation powder  -- 2 puff(s) inhaled once a day  -- Indication: For COPD (chronic obstructive pulmonary disease)    predniSONE 10 mg oral tablet  -- 3 tab(s) by mouth once a day for 1 day  2 tab(s) by mouth once a day for 3 days  1 tab by mouth once a day for 3 days  -- Indication: For COPD (chronic obstructive pulmonary disease)    aspirin 81 mg oral tablet, chewable  -- 1 tab(s) by mouth once a day  -- Indication: For Prophylactic measure    warfarin 2 mg oral tablet  -- 1 tab(s) by mouth once a day  Takes on THursday, Fri, Sat, Sunday  -- Indication: For History of DVT (deep venous thrombosis)    warfarin 1 mg oral tablet  -- 1 tab(s) by mouth once a day    Takes Monday, TUes, Wed  -- Indication: For History of DVT    insulin lispro 100 units/mL subcutaneous solution  --  subcutaneous 4 times a day (before meals and at bedtime); 1 Unit(s) if Glucose 151 - 200  2 Unit(s) if Glucose 201 - 250  3 Unit(s) if Glucose 251 - 300  4 Unit(s) if Glucose 301 - 350  5 Unit(s) if Glucose 351 - 400  6 Unit(s) if Glucose Greater Than 400  -- PATIENT NOT DIABETIC, MAY D/C AFTER PREDNISONE FINISHED  -- Indication: For Insulin dependent diabetes mellitus    Lantus 100 units/mL subcutaneous solution  -- 35 unit(s) subcutaneous once a day (at bedtime)  -- Indication: For Insulin dependent diabetes mellitus    atorvastatin 20 mg oral tablet  -- 1 tab(s) by mouth once a day (at bedtime)  -- Indication: For HLD (hyperlipidemia)    metoprolol tartrate 50 mg oral tablet  -- 1 tab(s) by mouth 2 times a day  -- Indication: For HTN (hypertension)    albuterol-ipratropium 2.5 mg-0.5 mg/3 mL inhalation solution  -- 3 milliliter(s) inhaled every 6 hours, As needed, Shortness of Breath and/or Wheezing  -- Indication: For COPD (chronic obstructive pulmonary disease)    amLODIPine 5 mg oral tablet  -- 1 tab(s) by mouth once a day  -- Indication: For HTN (hypertension)    furosemide 40 mg oral tablet  -- 1 tab(s) by mouth once a day  -- Indication: For Peripheral edema    pantoprazole 40 mg oral delayed release tablet  -- 1 tab(s) by mouth once a day (before a meal)  -- Indication: For Prophylactic measure    levothyroxine 50 mcg (0.05 mg) oral tablet  -- 1 tab(s) by mouth once a day  -- Indication: For Hypothyroidism

## 2017-10-05 NOTE — DISCHARGE NOTE ADULT - PLAN OF CARE
Resolved. Continue home medications. Taper steroids as instructed. Follow up with outpatient PCP. Continue Lasix. Follow up outpatient PCP. Continue home meds.

## 2017-10-05 NOTE — DISCHARGE NOTE ADULT - HOSPITAL COURSE
This is an 81 y.o. 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 She stated it started one week prior when she had non bloody diarrhea for two days. The dyspnea had progressively getting worsened since then. Patient uses 3 L NC O2 at home. She tried using Duoneb x3 at home but it did not help, nor did the 3L of O2. Patient denied any fever, chills,  n/v, palpitations, diaphoresis. She also admits to increased swelling of both her feet and increased lethargy today. Patient given solu Medrol 125 mg IV one dose, metoprolol 5 mg IV, 1 L IVF NS bolus, Duonebs x1, Lasix 40mg IV x1. Patient put on Bipap with improvement of breathing. CXR showed cleared lungs.  Echocardiogram showed ____________.   Cardio increased patient's Lasix to 40mg from 20 mg. This is an 81 y.o. 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 She stated it started one week prior when she had non bloody diarrhea for two days. The dyspnea had progressively getting worsened since then. Patient uses 3 L NC O2 at home. She tried using Duoneb x3 at home but it did not help, nor did the 3L of O2. Patient denied any fever, chills,  n/v, palpitations, diaphoresis. She also admits to increased swelling of both her feet and increased lethargy today. Patient given solu Medrol 125 mg IV one dose, metoprolol 5 mg IV, 1 L IVF NS bolus, Duonebs x1, Lasix 40mg IV x1. Patient put on Bipap with improvement of breathing. CXR showed cleared lungs.  Echocardiogram showed mild LVH with EF 55-60%. Cardio increased patient's Lasix to 40mg. Patient will continue Lasix at 40mg daily at home. Patient was placed on prednisone and will taper as instructed upon discharge.     On day of discharge, patient's vitals and PE were:   Vital Signs Last 24 Hrs  T(C): 36.6 (10-10-17 @ 04:56), Max: 36.7 (10-09-17 @ 23:26)  T(F): 97.9 (10-10-17 @ 04:56), Max: 98 (10-09-17 @ 23:26)  HR: 59 (10-10-17 @ 07:40) (59 - 69)  BP: 160/82 (10-10-17 @ 04:56) (157/81 - 160/82)  BP(mean): --  RR: 17 (10-10-17 @ 04:56) (17 - 17)  SpO2: 98% (10-10-17 @ 07:40) (96% - 98%)    Physical Exam:  General: Well developed, well nourished, NAD, elderly female  HEENT: NCAT, PERRLA, EOMI bl, moist mucous membranes, wearing corrective lenses  Neck: Supple, nontender, no mass  Neurology: A&Ox3, nonfocal, , sensation intact  Respiratory: CTA B/L, No W/R/R  CV: RRR, +S1/S2, no murmurs, rubs or gallops  Abdominal: Soft, NT, ND +BSx4, no guarding, no rebound  Extremities: +b/l 1+pitting edema, R>L, + peripheral pulses  MSK: Normal ROM, no joint erythema or warmth, no joint swelling   Skin: warm, dry, normal color, no rash or abnormal lesions    Patient is medically cleared for discharged. Plan discussed with patient.

## 2017-10-05 NOTE — PROGRESS NOTE ADULT - PROBLEM SELECTOR PLAN 3
-Last TTE in July 2016 showed LV systolic function EF 60%, Aortic sclerosis, Mitral annular calcifications with moderate mitral regurgitation    -f/u TTE   -Cardio recs- increasing lasix -Last TTE in July 2016 showed LV systolic function EF 60%, Aortic sclerosis, Mitral annular calcifications with moderate mitral regurgitation    -f/u TTE   -Cardio recs- increasing lasix to 40 mg PO -Last TTE in July 2016 showed LV systolic function EF 60%, Aortic sclerosis, Mitral annular calcifications with moderate mitral regurgitation. Aortic aneurysm 5.6 cm noted. Patient is not a candidate for EVAR at this time.   -f/u TTE   -Cardio recs- increasing lasix to 40 mg PO, start patient on ASA 81, likely subclinical CAD

## 2017-10-05 NOTE — DISCHARGE NOTE ADULT - PATIENT PORTAL LINK FT
“You can access the [facility] Patient Portal, offered by [facility], by registering with the following website: [email or web address]”

## 2017-10-05 NOTE — CONSULT NOTE ADULT - SUBJECTIVE AND OBJECTIVE BOX
[facility]         Deann Kelly, Arthur, Nelly, Sha, Genaro, Esme        [phone] (office)    CHIEF COMPLAINT: Patient is a 81y old  Female who presents with a chief complaint of worsening shortness of breath (05 Oct 2017 04:44)      HPI:  This is an 81 y.o. 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 She states it started one week prior when she had non bloody diarrhea for two days. The dyspnea has progressively getting worse since then. Patient uses 3 L NC O2 at home. She tried using Duoneb x3 at home but it did not help, nor did the 3L of O2. Patient denies any fever, chills,  n/v, palpitations, diaphoresis. She also admits to increased swelling of both her feet and increased lethargy today.    In the ED, vitals were Temp: 99.8F   /124 RR 24  SpO2: 86% RA. Sig labs include WBC 7.8, INR 4.10, PTT 40.1, PT 46, lactate 3.6, glucose 111, albumin 3.1,procalc <0.05, trop 0.192, pro BNP 1269. ABG showed pH 7.48, pCO2 33, HCO3 26. UA showed trace leuk est, neg nitrate. CXR ordered. EKG ordered. Patient put on BiPap. Gave solu Medrol 125 mg IV one dose, metoprolol 5 mg IV, 1 L IVF NS bolus, Duonebs x1. (04 Oct 2017 23:52)    Pt seen and examined at bedside, in NAD on Bipap. SOB improved on Bipap. Denies CP, dizziness, lightheadedness, palpitations, dyspnea, orthopnea.      PAST MEDICAL & SURGICAL HISTORY:  Insulin dependent diabetes mellitus  HLD (hyperlipidemia)  HTN (hypertension)  COPD (chronic obstructive pulmonary disease)  Emphysema  DVT (deep venous thrombosis)  No significant past surgical history      SOCIAL HISTORY: No tobacco currently, former smoker for 60 years 1ppd, quit 3 years ago, No alcohol or illicit drug use    FAMILY HISTORY:  Family history of acute myocardial infarction (Father)      Outpatient medications:    MEDICATIONS  (STANDING):  ALBUTerol/ipratropium for Nebulization 3 milliLiter(s) Nebulizer four times a day  amLODIPine   Tablet 5 milliGRAM(s) Oral daily  atorvastatin 20 milliGRAM(s) Oral at bedtime  buDESOnide   0.5 milliGRAM(s) Respule 0.5 milliGRAM(s) Inhalation two times a day  dextrose 5%. 1000 milliLiter(s) (50 mL/Hr) IV Continuous <Continuous>  dextrose 50% Injectable 12.5 Gram(s) IV Push once  furosemide    Tablet 20 milliGRAM(s) Oral daily  insulin glargine Injectable (LANTUS) 30 Unit(s) SubCutaneous at bedtime  insulin lispro (HumaLOG) corrective regimen sliding scale   SubCutaneous three times a day before meals  levothyroxine 50 MICROGram(s) Oral daily  methylPREDNISolone sodium succinate Injectable 40 milliGRAM(s) IV Push every 8 hours  metoprolol 50 milliGRAM(s) Oral two times a day  pantoprazole    Tablet 40 milliGRAM(s) Oral before breakfast    MEDICATIONS  (PRN):  acetaminophen   Tablet 650 milliGRAM(s) Oral every 6 hours PRN For Temp greater than 38 C (100.4 F)  ALBUTerol/ipratropium for Nebulization 3 milliLiter(s) Nebulizer every 8 hours PRN Respiratory Distress  dextrose Gel 1 Dose(s) Oral once PRN Blood Glucose LESS THAN 70 milliGRAM(s)/deciliter  glucagon  Injectable 1 milliGRAM(s) IntraMuscular once PRN Glucose LESS THAN 70 milligrams/deciliter  ondansetron Injectable 4 milliGRAM(s) IV Push every 6 hours PRN Nausea      Allergies    No Known Allergies    Intolerances        REVIEW OF SYSTEMS: Is negative for eye, ENT, GI, , allergic, dermatologic, musculoskeletal and neurologic, except as described above.    VITAL SIGNS:   Vital Signs Last 24 Hrs  T(C): 36.3 (05 Oct 2017 08:00), Max: 38 (04 Oct 2017 23:59)  T(F): 97.4 (05 Oct 2017 08:00), Max: 100.4 (04 Oct 2017 23:59)  HR: 63 (05 Oct 2017 08:00) (63 - 128)  BP: 153/76 (05 Oct 2017 08:00) (107/56 - 156/124)  BP(mean): --  RR: 16 (05 Oct 2017 08:00) (16 - 40)  SpO2: 96% (05 Oct 2017 08:00) (86% - 98%)    I&O's Summary      PHYSICAL EXAM:    Constitutional: NAD, awake and alert, on Bipap  Eyes:  EOMI, no oral cyanosis, conjunctivae clear, anicteric.  Pulmonary: Non-labored, breath sounds are decreased BL, no wheezing or rhonchi, mild crackles at BL lung bases  Cardiovascular:  regular S1 and S2. No murmur.  No rubs, gallops or clicks  Gastrointestinal: Bowel Sounds present, soft, nontender.   Lymph: 3+ pitting edema BL LE  Neurological: Alert, strength and sensitivity are grossly intact  Skin: No obvious lesions/rashes.   Psych:  Mood & affect appropriate .    LABS: All Labs Reviewed:                        11.4   4.3   )-----------( 183      ( 05 Oct 2017 05:41 )             36.4                         13.5   7.8   )-----------( 209      ( 04 Oct 2017 20:48 )             43.1     05 Oct 2017 05:41    142    |  105    |  15     ----------------------------<  191    3.5     |  27     |  1.20   04 Oct 2017 20:48    143    |  105    |  12     ----------------------------<  111    3.6     |  28     |  1.00     Ca    8.2        05 Oct 2017 05:41  Ca    8.5        04 Oct 2017 20:48    TPro  6.3    /  Alb  2.7    /  TBili  0.5    /  DBili  .20    /  AST  12     /  ALT  11     /  AlkPhos  65     05 Oct 2017 05:41  TPro  7.4    /  Alb  3.1    /  TBili  0.7    /  DBili  x      /  AST  14     /  ALT  11     /  AlkPhos  77     04 Oct 2017 20:48    PT/INR - ( 04 Oct 2017 20:48 )   PT: 46.0 sec;   INR: 4.10 ratio         PTT - ( 04 Oct 2017 20:48 )  PTT:40.1 sec  CARDIAC MARKERS ( 05 Oct 2017 02:26 )  .162 ng/mL / x     / x     / x     / x      CARDIAC MARKERS ( 04 Oct 2017 20:48 )  .192 ng/mL / x     / 55 U/L / x     / 0.8 ng/mL      Blood Culture:   10-05 @ 05:41  Pro Bnp 1497  10-04 @ 20:48  Pro Bnp 1269        RADIOLOGY: < from: Xray Chest 1 View AP- PORTABLE-Urgent (10.04.17 @ 21:12) >    EXAM:  PORTABLE CHEST URGENT                            PROCEDURE DATE:  10/04/2017          INTERPRETATION:  AP semierect chest on October 4 at 9:05 PM. Patient is   short of breath.    Poor inspiration crowds the chest.    The heart is magnified by technique.    Mild left upper lumbar curve again noted.    October 10, 2016 there were slight basilar effusions. These are no longer   evident. The lungs are presently clear.    IMPRESSION: Clear lungs at this time.                STACI SEWELL M.D.,ATTENDING RADIOLOGIST  This document has been electronically signed. Oct  5 2017  8:29AM                < end of copied text >      EKG: NSR HR 81, LAD, LA Fascicular block    Impression/Plan: [facility]         Deann Kelly, Arthur, Nelly, Sha, Genaro, Esme        [phone] (office)    CHIEF COMPLAINT: Patient is a 81y old  Female who presents with a chief complaint of worsening shortness of breath (05 Oct 2017 04:44)      HPI:  This is an 81 y.o. 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  She has a known large AAA, 5.6 cm in 2016.  She is not considered a candidate either for open repair or evar (due to calcified vessels).  She presents to the ED with SOB. She states it started one week prior when she had non bloody diarrhea for two days. The dyspnea has progressively getting worse since then. Patient uses 3 L NC O2 at home. She tried using Duoneb x3 at home but it did not help, nor did the 3L of O2. Her son, under the guidance of visiting physicians, increased lasix and steroids, but this did not help either.  She was brought to ER for further evaluation.      In the ED, vitals were Temp: 99.8F   /124 RR 24  SpO2: 86% RA. Sig labs include WBC 7.8, INR 4.10, PTT 40.1, PT 46, lactate 3.6, glucose 111, albumin 3.1,procalc <0.05, trop 0.192, pro BNP 1269. ABG showed pH 7.48, pCO2 33, HCO3 26. UA showed trace leuk est, neg nitrate. CXR ordered. EKG ordered. Patient put on BiPap. Gave solu Medrol 125 mg IV one dose, metoprolol 5 mg IV, 1 L IVF NS bolus, Duonebs x1. (04 Oct 2017 23:52)    Pt seen and examined at bedside, in NAD on Bipap. SOB improved on Bipap. Denies CP, dizziness, lightheadedness, palpitations, dyspnea, orthopnea.      PAST MEDICAL & SURGICAL HISTORY:  Insulin dependent diabetes mellitus  HLD (hyperlipidemia)  HTN (hypertension)  COPD (chronic obstructive pulmonary disease)  Emphysema  DVT (deep venous thrombosis)  No significant past surgical history      SOCIAL HISTORY: No tobacco currently, former smoker for 60 years 1ppd, quit 3 years ago, No alcohol or illicit drug use    FAMILY HISTORY:  Family history of acute myocardial infarction (Father)      Outpatient medications:    MEDICATIONS  (STANDING):  ALBUTerol/ipratropium for Nebulization 3 milliLiter(s) Nebulizer four times a day  amLODIPine   Tablet 5 milliGRAM(s) Oral daily  atorvastatin 20 milliGRAM(s) Oral at bedtime  buDESOnide   0.5 milliGRAM(s) Respule 0.5 milliGRAM(s) Inhalation two times a day  dextrose 5%. 1000 milliLiter(s) (50 mL/Hr) IV Continuous <Continuous>  dextrose 50% Injectable 12.5 Gram(s) IV Push once  furosemide    Tablet 20 milliGRAM(s) Oral daily  insulin glargine Injectable (LANTUS) 30 Unit(s) SubCutaneous at bedtime  insulin lispro (HumaLOG) corrective regimen sliding scale   SubCutaneous three times a day before meals  levothyroxine 50 MICROGram(s) Oral daily  methylPREDNISolone sodium succinate Injectable 40 milliGRAM(s) IV Push every 8 hours  metoprolol 50 milliGRAM(s) Oral two times a day  pantoprazole    Tablet 40 milliGRAM(s) Oral before breakfast    MEDICATIONS  (PRN):  acetaminophen   Tablet 650 milliGRAM(s) Oral every 6 hours PRN For Temp greater than 38 C (100.4 F)  ALBUTerol/ipratropium for Nebulization 3 milliLiter(s) Nebulizer every 8 hours PRN Respiratory Distress  dextrose Gel 1 Dose(s) Oral once PRN Blood Glucose LESS THAN 70 milliGRAM(s)/deciliter  glucagon  Injectable 1 milliGRAM(s) IntraMuscular once PRN Glucose LESS THAN 70 milligrams/deciliter  ondansetron Injectable 4 milliGRAM(s) IV Push every 6 hours PRN Nausea      Allergies    No Known Allergies    Intolerances        REVIEW OF SYSTEMS: Is negative for eye, ENT, GI, , allergic, dermatologic, musculoskeletal and neurologic, except as described above.    VITAL SIGNS:   Vital Signs Last 24 Hrs  T(C): 36.3 (05 Oct 2017 08:00), Max: 38 (04 Oct 2017 23:59)  T(F): 97.4 (05 Oct 2017 08:00), Max: 100.4 (04 Oct 2017 23:59)  HR: 63 (05 Oct 2017 08:00) (63 - 128)  BP: 153/76 (05 Oct 2017 08:00) (107/56 - 156/124)  BP(mean): --  RR: 16 (05 Oct 2017 08:00) (16 - 40)  SpO2: 96% (05 Oct 2017 08:00) (86% - 98%)    I&O's Summary      PHYSICAL EXAM:    Constitutional: NAD, awake and alert, on Bipap  Eyes:  EOMI, no oral cyanosis, conjunctivae clear, anicteric.  Pulmonary: Non-labored, breath sounds are decreased BL, no wheezing or rhonchi, mild crackles at BL lung bases  Cardiovascular:  regular S1 and S2. No murmur.  No rubs, gallops or clicks  Gastrointestinal: Bowel Sounds present, soft, nontender.   Lymph: 2+ pitting edema BL LE  Neurological: Alert, strength and sensitivity are grossly intact  Skin: No obvious lesions/rashes.   Psych:  Mood & affect appropriate .    LABS: All Labs Reviewed:                        11.4   4.3   )-----------( 183      ( 05 Oct 2017 05:41 )             36.4                         13.5   7.8   )-----------( 209      ( 04 Oct 2017 20:48 )             43.1     05 Oct 2017 05:41    142    |  105    |  15     ----------------------------<  191    3.5     |  27     |  1.20   04 Oct 2017 20:48    143    |  105    |  12     ----------------------------<  111    3.6     |  28     |  1.00     Ca    8.2        05 Oct 2017 05:41  Ca    8.5        04 Oct 2017 20:48    TPro  6.3    /  Alb  2.7    /  TBili  0.5    /  DBili  .20    /  AST  12     /  ALT  11     /  AlkPhos  65     05 Oct 2017 05:41  TPro  7.4    /  Alb  3.1    /  TBili  0.7    /  DBili  x      /  AST  14     /  ALT  11     /  AlkPhos  77     04 Oct 2017 20:48    PT/INR - ( 04 Oct 2017 20:48 )   PT: 46.0 sec;   INR: 4.10 ratio         PTT - ( 04 Oct 2017 20:48 )  PTT:40.1 sec  CARDIAC MARKERS ( 05 Oct 2017 02:26 )  .162 ng/mL / x     / x     / x     / x      CARDIAC MARKERS ( 04 Oct 2017 20:48 )  .192 ng/mL / x     / 55 U/L / x     / 0.8 ng/mL      Blood Culture:   10-05 @ 05:41  Pro Bnp 1497  10-04 @ 20:48  Pro Bnp 1269        RADIOLOGY: < from: Xray Chest 1 View AP- PORTABLE-Urgent (10.04.17 @ 21:12) >    EXAM:  PORTABLE CHEST URGENT                            PROCEDURE DATE:  10/04/2017          INTERPRETATION:  AP semierect chest on October 4 at 9:05 PM. Patient is   short of breath.    Poor inspiration crowds the chest.    The heart is magnified by technique.    Mild left upper lumbar curve again noted.    October 10, 2016 there were slight basilar effusions. These are no longer   evident. The lungs are presently clear.    IMPRESSION: Clear lungs at this time.                STACI SEWELL M.D.,ATTENDING RADIOLOGIST  This document has been electronically signed. Oct  5 2017  8:29AM                < end of copied text >      EKG: NSR HR 81, LAD, LA Fascicular block

## 2017-10-05 NOTE — DISCHARGE NOTE ADULT - CARE PROVIDER_API CALL
Loly Mayer (NP; RN), NP in Adult Health  1982 [address] [address]  Phone: [phone]  Fax: [phone]

## 2017-10-05 NOTE — DISCHARGE NOTE ADULT - CARE PLAN
Principal Discharge DX:	COPD exacerbation  Goal:	Resolved.  Instructions for follow-up, activity and diet:	Continue home medications. Taper steroids as instructed. Follow up with outpatient PCP.  Secondary Diagnosis:	Peripheral edema  Instructions for follow-up, activity and diet:	Continue Lasix. Follow up outpatient PCP.  Secondary Diagnosis:	Insulin dependent diabetes mellitus  Instructions for follow-up, activity and diet:	Continue home meds.  Secondary Diagnosis:	HTN (hypertension)  Instructions for follow-up, activity and diet:	Continue home meds.  Secondary Diagnosis:	HLD (hyperlipidemia)  Instructions for follow-up, activity and diet:	Continue home meds.  Secondary Diagnosis:	Hypothyroidism  Instructions for follow-up, activity and diet:	Continue home meds.

## 2017-10-05 NOTE — PROGRESS NOTE ADULT - ATTENDING COMMENTS
81 y.o. 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 Admitted for acute respiratory distress, COPD exacerbation, acute CHF. Plan: f/u echo, wean off bipap, diuretics, apprec cardio  recs, apprec pulm recs, monitor clinical course, hold coumadin as supratherapeutic inr 81 y.o. 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 Admitted for acute respiratory distress, acute COPD exacerbation and probable exacerbation acute on chronic diastolic CHF last echo last year july with EF 60%. Plan: f/u echo, wean off bipap, diuretics, apprec cardio  recs, apprec pulm recs, monitor clinical course, hold coumadin as supratherapeutic inr, daily weights, monitor i/os

## 2017-10-05 NOTE — CONSULT NOTE ADULT - ASSESSMENT
80 yo F PMH COPD (60 pack years, quit 3 years ago), recurrent DVT on coumadin, HTN, HLD, IDDM presented to the ED with SOB.      - No evidence of acute significant ischemia, pt asymptomatic, trops elevated likely to demand ischemia   - acute CHF exacerbation, ProBNP 1497, increased LE edema, mild crackles at bases, consider increasing lasix  - No acute changes on EKG compared to previous, NSR, LA fascicular block HR 81  - Previous ECHO in 07/16 shows normal LV function with EF: 60%, moderate MR, repeat ECHO f/u results  - BP well controlled, continue home BP meds, monitor routine hemodynamics  - monitor and replete lytes, keep K>4, Mg>2  Other cardiovascular workup will depend on clinical course.  All other workup per primary team  Will follow closely. 80 yo F PMH COPD (60 pack years, quit 3 years ago), recurrent DVT on coumadin, HTN, HLD, IDDM, known large AAA not considered a candidate for repair, presented to the ED with SOB.      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  This should be managed conservatively  - would add asa 81  - last echo 2016 shows normal lvef without significant valve disease, repeat study ordered  - her presentation likely in part from decompensated hf, noting edema and elevated bnp, would increase lasix to 40 iv daily  - BP well controlled, continue home BP meds  - known AAA, not considered a candidate for any sort of repair  - monitor and replete lytes, keep K>4, Mg>2  - Other cardiovascular workup will depend on clinical course.  - All other workup per primary team  - Will follow

## 2017-10-05 NOTE — CONSULT NOTE ADULT - SUBJECTIVE AND OBJECTIVE BOX
Chart reviewed: Assessment plan is as below Note will be updated as more  patient data is gathered Chart reviewed: Assessment plan is as below Note will be updated as more  patient data is gathered     Patient seen and examined today Plan discussed/Questions answered  (with patient/ancillary staff/colleagues) Chart notes reviewd More detailed Pulm/Critical Care notes, assessment and plan  will be added later today as needed after reviewing further labs as they become available    81yFemale  No Known Allergies  Home Medications:  albuterol-ipratropium 2.5 mg-0.5 mg/3 mL inhalation solution: 3 milliliter(s) inhaled every 6 hours, As needed, Shortness of Breath and/or Wheezing (04 Oct 2017 20:51)  amLODIPine 5 mg oral tablet: 1 tab(s) orally once a day (05 Oct 2017 00:31)  aspirin 81 mg oral tablet, chewable: 1 tab(s) orally once a day (04 Oct 2017 20:51)  atorvastatin 20 mg oral tablet: 1 tab(s) orally once a day (at bedtime) (04 Oct 2017 20:51)  budesonide 0.5 mg/2 mL inhalation suspension: 1 puff(s) inhaled 2 times a day (04 Oct 2017 20:51)  furosemide 20 mg oral tablet: 1 tab(s) orally once a day (05 Oct 2017 00:31)  insulin lispro 100 units/mL subcutaneous solution:  subcutaneous 4 times a day (before meals and at bedtime); 1 Unit(s) if Glucose 151 - 200  2 Unit(s) if Glucose 201 - 250  3 Unit(s) if Glucose 251 - 300  4 Unit(s) if Glucose 301 - 350  5 Unit(s) if Glucose 351 - 400  6 Unit(s) if Glucose Greater Than 400 (04 Oct 2017 20:51)  Lantus 100 units/mL subcutaneous solution: 35 unit(s) subcutaneous once a day (at bedtime) (05 Oct 2017 00:31)  levothyroxine 50 mcg (0.05 mg) oral tablet: 1 tab(s) orally once a day (05 Oct 2017 00:59)  metoprolol tartrate 50 mg oral tablet: 1 tab(s) orally 2 times a day (04 Oct 2017 20:51)  pantoprazole 40 mg oral delayed release tablet: 1 tab(s) orally once a day (before a meal) (04 Oct 2017 20:51)  Pulmicort Flexhaler 90 mcg/inh inhalation powder: 2 puff(s) inhaled once a day (05 Oct 2017 00:59)  warfarin 1 mg oral tablet: 1 tab(s) orally once a day    Takes Monday, TUes, Wed (05 Oct 2017 00:56)  warfarin 2 mg oral tablet: 1 tab(s) orally once a day  Takes on THursday, Fri, Sat, Sunday (05 Oct 2017 00:55)      [facility] MEDS   MEDICATIONS  (STANDING):  amLODIPine   Tablet 5 milliGRAM(s) Oral daily  atorvastatin 20 milliGRAM(s) Oral at bedtime  dextrose 5%. 1000 milliLiter(s) (50 mL/Hr) IV Continuous <Continuous>  dextrose 50% Injectable 12.5 Gram(s) IV Push once  furosemide    Tablet 20 milliGRAM(s) Oral daily  insulin glargine Injectable (LANTUS) 30 Unit(s) SubCutaneous at bedtime  insulin lispro (HumaLOG) corrective regimen sliding scale   SubCutaneous three times a day before meals  levothyroxine 50 MICROGram(s) Oral daily  methylPREDNISolone sodium succinate Injectable 40 milliGRAM(s) IV Push every 8 hours  metoprolol 50 milliGRAM(s) Oral two times a day  pantoprazole    Tablet 40 milliGRAM(s) Oral before breakfast      PAST MEDICAL & SURGICAL HISTORY:  Insulin dependent diabetes mellitus  HLD (hyperlipidemia)  HTN (hypertension)  COPD (chronic obstructive pulmonary disease)  Emphysema  DVT (deep venous thrombosis)  No significant past surgical history      Vital Signs Last 24 Hrs  T(C): 36.8 (05 Oct 2017 04:02), Max: 38 (04 Oct 2017 23:59)  T(F): 98.2 (05 Oct 2017 04:02), Max: 100.4 (04 Oct 2017 23:59)  HR: 70 (05 Oct 2017 04:02) (67 - 128)  BP: 116/67 (05 Oct 2017 04:02) (107/56 - 156/124)  BP(mean): --  RR: 20 (05 Oct 2017 04:02) (20 - 40)  SpO2: 93% (05 Oct 2017 04:02) (86% - 98%)    PHYSICAL EXAMINATION:    GENERAL: The patient comfortable with no apparent distress.     HEENT: Head is normocephalic and atraumatic.      NECK: Supple with no elevated JVP.    LUNGS: Bilateral air entry with no wheeze and rhonchi.    HEART: S1 and S2 present without murmur.    ABDOMEN: Soft, nontender, and nondistended. No hepatosplenomegaly is noted.    EXTREMITIES: No edema or calf tenderness.    NEUROLOGIC: Grossly intact.    LABS:                        11.4   4.3   )-----------( 183      ( 05 Oct 2017 05:41 )             36.4     10-05    142  |  105  |  15  ----------------------------<  191<H>  3.5   |  27  |  1.20    Ca    8.2<L>      05 Oct 2017 05:41    TPro  6.3  /  Alb  2.7<L>  /  TBili  0.5  /  DBili  .20  /  AST  12<L>  /  ALT  11<L>  /  AlkPhos  65  10-05    PT/INR - ( 04 Oct 2017 20:48 )   PT: 46.0 sec;   INR: 4.10 ratio         PTT - ( 04 Oct 2017 20:48 )  PTT:40.1 sec  CARDIAC MARKERS ( 05 Oct 2017 02:26 )  .162 ng/mL / x     / x     / x     / x      CARDIAC MARKERS ( 04 Oct 2017 20:48 )  .192 ng/mL / x     / 55 U/L / x     / 0.8 ng/mL      ABG - ( 04 Oct 2017 21:13 )  pH: 7.48  /  pCO2: 33    /  pO2: 101   / HCO3: 26    / Base Excess: 1.3   /  SaO2: 98 Chart reviewed: Assessment plan is as below Note will be updated as more  patient data is gathered     Patient seen and examined today Plan discussed/Questions answered  (with patient/ancillary staff/colleagues) Chart notes reviewd More detailed Pulm/Critical Care notes, assessment and plan  will be added later today as needed after reviewing further labs as they become available    81yFemale  No Known Allergies  Home Medications:  albuterol-ipratropium 2.5 mg-0.5 mg/3 mL inhalation solution: 3 milliliter(s) inhaled every 6 hours, As needed, Shortness of Breath and/or Wheezing (04 Oct 2017 20:51)  amLODIPine 5 mg oral tablet: 1 tab(s) orally once a day (05 Oct 2017 00:31)  aspirin 81 mg oral tablet, chewable: 1 tab(s) orally once a day (04 Oct 2017 20:51)  atorvastatin 20 mg oral tablet: 1 tab(s) orally once a day (at bedtime) (04 Oct 2017 20:51)  budesonide 0.5 mg/2 mL inhalation suspension: 1 puff(s) inhaled 2 times a day (04 Oct 2017 20:51)  furosemide 20 mg oral tablet: 1 tab(s) orally once a day (05 Oct 2017 00:31)  insulin lispro 100 units/mL subcutaneous solution:  subcutaneous 4 times a day (before meals and at bedtime); 1 Unit(s) if Glucose 151 - 200  2 Unit(s) if Glucose 201 - 250  3 Unit(s) if Glucose 251 - 300  4 Unit(s) if Glucose 301 - 350  5 Unit(s) if Glucose 351 - 400  6 Unit(s) if Glucose Greater Than 400 (04 Oct 2017 20:51)  Lantus 100 units/mL subcutaneous solution: 35 unit(s) subcutaneous once a day (at bedtime) (05 Oct 2017 00:31)  levothyroxine 50 mcg (0.05 mg) oral tablet: 1 tab(s) orally once a day (05 Oct 2017 00:59)  metoprolol tartrate 50 mg oral tablet: 1 tab(s) orally 2 times a day (04 Oct 2017 20:51)  pantoprazole 40 mg oral delayed release tablet: 1 tab(s) orally once a day (before a meal) (04 Oct 2017 20:51)  Pulmicort Flexhaler 90 mcg/inh inhalation powder: 2 puff(s) inhaled once a day (05 Oct 2017 00:59)  warfarin 1 mg oral tablet: 1 tab(s) orally once a day    Takes Monday, TUes, Wed (05 Oct 2017 00:56)  warfarin 2 mg oral tablet: 1 tab(s) orally once a day  Takes on THursday, Fri, Sat, Sunday (05 Oct 2017 00:55)      \[facility]\"" MEDS   MEDICATIONS  (STANDING):  amLODIPine   Tablet 5 milliGRAM(s) Oral daily  atorvastatin 20 milliGRAM(s) Oral at bedtime  dextrose 5%. 1000 milliLiter(s) (50 mL/Hr) IV Continuous <Continuous>  dextrose 50% Injectable 12.5 Gram(s) IV Push once  furosemide    Tablet 20 milliGRAM(s) Oral daily  insulin glargine Injectable (LANTUS) 30 Unit(s) SubCutaneous at bedtime  insulin lispro (HumaLOG) corrective regimen sliding scale   SubCutaneous three times a day before meals  levothyroxine 50 MICROGram(s) Oral daily  methylPREDNISolone sodium succinate Injectable 40 milliGRAM(s) IV Push every 8 hours  metoprolol 50 milliGRAM(s) Oral two times a day  pantoprazole    Tablet 40 milliGRAM(s) Oral before breakfast      PAST MEDICAL & SURGICAL HISTORY:  Insulin dependent diabetes mellitus  HLD (hyperlipidemia)  HTN (hypertension)  COPD (chronic obstructive pulmonary disease)  Emphysema  DVT (deep venous thrombosis)  No significant past surgical history      Vital Signs Last 24 Hrs  T(C): 36.8 (05 Oct 2017 04:02), Max: 38 (04 Oct 2017 23:59)  T(F): 98.2 (05 Oct 2017 04:02), Max: 100.4 (04 Oct 2017 23:59)  HR: 70 (05 Oct 2017 04:02) (67 - 128)  BP: 116/67 (05 Oct 2017 04:02) (107/56 - 156/124)  BP(mean): --  RR: 20 (05 Oct 2017 04:02) (20 - 40)  SpO2: 93% (05 Oct 2017 04:02) (86% - 98%)    PHYSICAL EXAMINATION:    GENERAL: The patient comfortable with no apparent distress.     HEENT: Head is normocephalic and atraumatic.      NECK: Supple with no elevated JVP.    LUNGS: Bilateral air entry with no wheeze and rhonchi.    HEART: S1 and S2 present without murmur.    ABDOMEN: Soft, nontender, and nondistended. No hepatosplenomegaly is noted.    EXTREMITIES: No edema or calf tenderness.    NEUROLOGIC: Grossly intact.    LABS:                        11.4   4.3   )-----------( 183      ( 05 Oct 2017 05:41 )             36.4     10-05    142  |  105  |  15  ----------------------------<  191<H>  3.5   |  27  |  1.20    Ca    8.2<L>      05 Oct 2017 05:41    TPro  6.3  /  Alb  2.7<L>  /  TBili  0.5  /  DBili  .20  /  AST  12<L>  /  ALT  11<L>  /  AlkPhos  65  10-05    PT/INR - ( 04 Oct 2017 20:48 )   PT: 46.0 sec;   INR: 4.10 ratio         PTT - ( 04 Oct 2017 20:48 )  PTT:40.1 sec  CARDIAC MARKERS ( 05 Oct 2017 02:26 )  .162 ng/mL / x     / x     / x     / x      CARDIAC MARKERS ( 04 Oct 2017 20:48 )  .192 ng/mL / x     / 55 U/L / x     / 0.8 ng/mL      ABG - ( 04 Oct 2017 21:13 )  pH: 7.48  /  pCO2: 33    /  pO2: 101   / HCO3: 26    / Base Excess: 1.3   /  SaO2: 98          Patient                                              RADHA GRAVES [facility]    ALLERGY nka   CONTACT Son Jose MURRAY [phone]   REASON FOR VISIT   Initial evaluation/Pulmonary consultation requested  10/5/2017 by Dr Angus Hernandez   Patient examined chart reviewed  HOSPITAL ADMISSION [facility] Dr Angus Hernandez 10/4/2017  PATIENT CAME  FROM (if information available)    PAST MEDICAL & SURGICAL HISTORY:  Insulin dependent diabetes mellitus  HLD (hyperlipidemia)  HTN (hypertension)  COPD (chronic obstructive pulmonary disease)  Emphysema  DVT (deep venous thrombosis)  No significant past surgical history  PATIENT DESCRIPTION CC HPI PMH SOCIAL   81 y.o. 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   HOME MEDS Duoneb norvasc 5 asa 81lipitor 20 pulmicort lasix 20 sl scale Lantus 35 levoxyl 50 metoprolol 50.2 protonix 40 warfarin 1   HOSPITAL COURSE 10/4/2017 ADMISSION [facility]   81 f HO recurrent DVT on coumadin HO HTN, HLD, IDDM HO poss Takosubo cmpthy 9/2016 was on vasopressors and inotropes 9/2016 hosp admitted 10/4/2017 with SOB   AC RESP FAILURE (HYPOXEMIC) Managed with NIV To be weaned  RESP TRACT BACTERIAL INFECTION Excluded Neg pct Neg RVP   DYSPNEA Likely sec COPD Ex v ADHF although CXR showed lung fields to be clear VTE unlikely as INR was 4 on arrival  COPD EX Managed with BD steroids   HPI INITIAL PRESENTATION 10/4/2017 ADMISSION [facility]   She states it started one week prior when she had non bloody diarrhea for two days. THe dyspnea has progressively getting worse since then. Patient uses 3 L NC O2 at home. She tried using Duoneb x3 at home but it did not help, nor did the 3L of O2. Patient denies any fever, chills,  n/v, palpitations, diaphoresis. She also admits to increased swelling of both her feet and increased lethargy today.  In the ED, vitals were Temp: 99.8F   /124 RR 24  SpO2: 86% RA. Sig labs include WBC 7.8, INR 4.10, PTT 40.1, PT 46, lactate 3.6, glucose 111, albumin 3.1,procalc <0.05, trop 0.192, pro BNP 1269. ABG showed pH 7.48, pCO2 33, HCO3 26. UA showed trace leuk est, neg nitrate. CXR ordered. EKG ordered. Patient put on BiPap. Gave solu Medrol 125 mg IV one dose, metoprolol 5 mg IV, 1 L IVF NS bolus, Duonebs x1.  VITALS/LABS  10/5/2017 4a afeb 70 116/67 93% 75   10/5/2017 10/5/.4  10/5/2017 W 4.3 Hb 11.4 Plt 183 Na 142 K 3.5 CO2 27 Cr 1.2 G 191   10/4 INR 4.1   10/4/2017 AP AST AL n   PROBLEM ASSESSMENT PLAN  ACUTE RESP FAILURE   On BPAP 10/5/2017 10/5/.4  10/4/2017 9p 10/5/.4 748/33/101  HO RECURRENT VTE   On Coumadin  9/26/2016 CTA Ch  1) NO PE   10/1/2016 V duplex legs No dvt   RO RESP TRACT INFECTION  10/4 pct n 10/4/2017 RVP n Abio held off   COPD   Duoneb.3p (10/5)   Solumed 40.3 (10/5)   RO MI  10/4-10/5 ck 55 Tr 1 .192 (i) - .162 (i)   atorvastat 20 (10/4) metorpolol 50.2 (10/5)   HYPERTENSION  Norvasc 5 (10/5)   RO CHF   Laxix 20 (10/5)   7/24/2016 ECHO EF 60% Mod MR    COAGULOPATHY SEC COUMADIN   10/4 INR 4.1   DIABETES   Lantus 30 (10/5) sl scale (10/5)   PREEXISTING ISSUES   PROBLEM:  HYPOTHYROID Levoxyl 50 910/5)                 GLOBAL ISSUE/BEST PRACTICE:        PROBLEM:      Analgesia:     na                        PROBLEM: Sedation:     na               PROBLEM: HOB elevation:   y             PROBLEM: Stress ulcer proph:    protonix 40 910/5)                       PROBLEM: VTE prophylaxis:      On coumadin poa (10/5)                   PROBLEM: Glycemic control:    On insulin (10/5)    PROBLEM: Nutrition:    cons carb renal (10/5)           PROBLEM: Advanced directive: na     PROBLEM: Allergies:  na  TIME SPENT Over 25 minutes aggregate care time spent on encounter; activities included   direct patient care, counseling and/or coordinating care reviewing notes, lab data/ imaging , discussion with multidisciplinary team/ patient  /family. Risks, benefits, alternatives  discussed in detail. Questions/concerns  were addressed  to the best of my ability .

## 2017-10-05 NOTE — PROGRESS NOTE ADULT - PROBLEM SELECTOR PLAN 5
-Start on ISS, continue home lantus 30 mg -Start on ISS, continue home lantus 30 mg, maintain hypoglycemic protocol

## 2017-10-05 NOTE — PATIENT PROFILE ADULT. - VISION (WITH CORRECTIVE LENSES IF THE PATIENT USUALLY WEARS THEM):
patient uses bifocal glasses/Partially impaired: cannot see medication labels or newsprint, but can see obstacles in path, and the surrounding layout; can count fingers at arm's length

## 2017-10-05 NOTE — DISCHARGE NOTE ADULT - SECONDARY DIAGNOSIS.
Peripheral edema Insulin dependent diabetes mellitus HTN (hypertension) HLD (hyperlipidemia) Hypothyroidism

## 2017-10-05 NOTE — PROGRESS NOTE ADULT - PROBLEM SELECTOR PLAN 8
H/o DVT x2, currently on warfarin at home but is supra therapeutic in hospital. Last dose was 10/3 PM. Warfarin held on 10/4 for INR of 4.1  - Home regimen is 1mg on M, T, W and 2 mg on Th, F, Sat, Sun. H/o DVT x2, currently on warfarin at home but is supra therapeutic in hospital. Last dose was 10/3 PM. Warfarin held on 10/4 for INR of 4.1. Will hold today.   - Home regimen is 1mg on M, T, W and 2 mg on Th, F, Sat, Sun.

## 2017-10-05 NOTE — PROGRESS NOTE ADULT - SUBJECTIVE AND OBJECTIVE BOX
Patient is a 81y old  Female who presents with a chief complaint of worsening shortness of breath (05 Oct 2017 04:44)   SOB    INTERVAL HPI/OVERNIGHT EVENTS: 81 y.o. 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 Admitted for ARDS, COPD exacerbation, acute CHF. Patient seen and examined at bedside. Patient on Bipap. States that her breathing has improved after being on Bipap. She normally uses 3.4 L NC home oxygen. Patient also complained of swelling b/l, R>L. Denies fevers, chills, chest pain, abdominal pain.     MEDICATIONS  (STANDING):  ALBUTerol/ipratropium for Nebulization 3 milliLiter(s) Nebulizer four times a day  amLODIPine   Tablet 5 milliGRAM(s) Oral daily  atorvastatin 20 milliGRAM(s) Oral at bedtime  buDESOnide   0.5 milliGRAM(s) Respule 0.5 milliGRAM(s) Inhalation two times a day  dextrose 5%. 1000 milliLiter(s) (50 mL/Hr) IV Continuous <Continuous>  dextrose 50% Injectable 12.5 Gram(s) IV Push once  furosemide    Tablet 20 milliGRAM(s) Oral daily  insulin glargine Injectable (LANTUS) 30 Unit(s) SubCutaneous at bedtime  insulin lispro (HumaLOG) corrective regimen sliding scale   SubCutaneous three times a day before meals  levothyroxine 50 MICROGram(s) Oral daily  methylPREDNISolone sodium succinate Injectable 40 milliGRAM(s) IV Push every 8 hours  metoprolol 50 milliGRAM(s) Oral two times a day  pantoprazole    Tablet 40 milliGRAM(s) Oral before breakfast    MEDICATIONS  (PRN):  acetaminophen   Tablet 650 milliGRAM(s) Oral every 6 hours PRN For Temp greater than 38 C (100.4 F)  ALBUTerol/ipratropium for Nebulization 3 milliLiter(s) Nebulizer every 8 hours PRN Respiratory Distress  dextrose Gel 1 Dose(s) Oral once PRN Blood Glucose LESS THAN 70 milliGRAM(s)/deciliter  glucagon  Injectable 1 milliGRAM(s) IntraMuscular once PRN Glucose LESS THAN 70 milligrams/deciliter  ondansetron Injectable 4 milliGRAM(s) IV Push every 6 hours PRN Nausea      Allergies    No Known Allergies    Intolerances    REVIEW OF SYSTEMS:  CONSTITUTIONAL: No fever, weight loss, or fatigue  EYES: No eye pain, visual disturbances, or discharge  ENMT:  No difficulty hearing, tinnitus, vertigo; No sinus or throat pain  RESPIRATORY: +improved shortness of breath, No cough, wheezing, chills or hemoptysis;   CARDIOVASCULAR: +leg swelling, No chest pain, palpitations, dizziness  GASTROINTESTINAL: No abdominal or epigastric pain. No nausea, vomiting, or hematemesis; No diarrhea or constipation. No melena or hematochezia.  GENITOURINARY: No dysuria, frequency, hematuria, or incontinence  NEUROLOGICAL: +burning sensation in b/l heels, likely due to DM, No headaches, memory loss, loss of strength, numbness, or tremors  SKIN: No itching, burning, rashes, or lesions   MUSCULOSKELETAL:+b/l leg swelling, No joint pain or swelling; No muscle, back, or extremity pain  PSYCHIATRIC: No depression, anxiety, mood swings, or difficulty sleeping    Vital Signs Last 24 Hrs  T(C): 36.3 (10-05-17 @ 08:00), Max: 38 (10-04-17 @ 23:59)  T(F): 97.4 (10-05-17 @ 08:00), Max: 100.4 (10-04-17 @ 23:59)  HR: 63 (10-05-17 @ 08:00) (63 - 114)  BP: 153/76 (10-05-17 @ 08:00) (107/56 - 153/76)  BP(mean): --  RR: 16 (10-05-17 @ 08:00) (16 - 26)  SpO2: 96% (10-05-17 @ 08:00) (93% - 98%)    PHYSICAL EXAM:  GENERAL: elderly female, bedbound, lying comfortably in bed with Bipap  HEAD:  Atraumatic, Normocephalic  EYES: EOMI, PERRL, conjunctiva and sclera clear, wearing corrective lenses  ENMT: unable to visualize with Bipap machine on  NERVOUS SYSTEM:  Alert & Oriented X3, Good concentration; Motor Strength 3/5 B/L upper extremities  CHEST/LUNG: Pt on Bipap; No rales, rhonchi, wheezing, or rubs  HEART: Regular rate and rhythm; No murmurs, rubs, or gallops  ABDOMEN: Soft, Nontender, Nondistended; Bowel sounds present, no guarding, no rebound  EXTREMITIES: non-pitting edema or b/l legs, R>L, decreased ROM of b/l lower ext, sensation in tact, +hammer toes b/l, No clubbing, cyanosis, or edema  SKIN: No rashes or lesions or heels of feet    LABS:                        11.4   4.3   )-----------( 183      ( 05 Oct 2017 05:41 )             36.4     05 Oct 2017 05:41    142    |  105    |  15     ----------------------------<  191    3.5     |  27     |  1.20     Ca    8.2        05 Oct 2017 05:41    TPro  6.3    /  Alb  2.7    /  TBili  0.5    /  DBili  .20    /  AST  12     /  ALT  11     /  AlkPhos  65     05 Oct 2017 05:41    PT/INR - ( 04 Oct 2017 20:48 )   PT: 46.0 sec;   INR: 4.10 ratio         PTT - ( 04 Oct 2017 20:48 )  PTT:40.1 sec  CAPILLARY BLOOD GLUCOSE  183 (05 Oct 2017 08:00)  161 (05 Oct 2017 02:38)        BLOOD CULTURE    RADIOLOGY & ADDITIONAL TESTS:    Imaging Personally Reviewed:  [ ] YES     Consultant(s) Notes Reviewed:      Care Discussed with Consultants/Other Providers: Patient is a 81y old  Female who presents with a chief complaint of worsening shortness of breath (05 Oct 2017 04:44)   SOB    INTERVAL HPI/OVERNIGHT EVENTS: 81 y.o. 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 Admitted for ARDS, COPD exacerbation, acute CHF. Patient seen and examined at bedside. Patient on Bipap. States that her breathing has improved after being on Bipap. She normally uses 3.4 L NC home oxygen. Patient also complained of swelling b/l, R>L. Denies fevers, chills, chest pain, abdominal pain.     MEDICATIONS  (STANDING):  ALBUTerol/ipratropium for Nebulization 3 milliLiter(s) Nebulizer four times a day  amLODIPine   Tablet 5 milliGRAM(s) Oral daily  atorvastatin 20 milliGRAM(s) Oral at bedtime  buDESOnide   0.5 milliGRAM(s) Respule 0.5 milliGRAM(s) Inhalation two times a day  dextrose 5%. 1000 milliLiter(s) (50 mL/Hr) IV Continuous <Continuous>  dextrose 50% Injectable 12.5 Gram(s) IV Push once  furosemide    Tablet 20 milliGRAM(s) Oral daily  insulin glargine Injectable (LANTUS) 30 Unit(s) SubCutaneous at bedtime  insulin lispro (HumaLOG) corrective regimen sliding scale   SubCutaneous three times a day before meals  levothyroxine 50 MICROGram(s) Oral daily  methylPREDNISolone sodium succinate Injectable 40 milliGRAM(s) IV Push every 8 hours  metoprolol 50 milliGRAM(s) Oral two times a day  pantoprazole    Tablet 40 milliGRAM(s) Oral before breakfast    MEDICATIONS  (PRN):  acetaminophen   Tablet 650 milliGRAM(s) Oral every 6 hours PRN For Temp greater than 38 C (100.4 F)  ALBUTerol/ipratropium for Nebulization 3 milliLiter(s) Nebulizer every 8 hours PRN Respiratory Distress  dextrose Gel 1 Dose(s) Oral once PRN Blood Glucose LESS THAN 70 milliGRAM(s)/deciliter  glucagon  Injectable 1 milliGRAM(s) IntraMuscular once PRN Glucose LESS THAN 70 milligrams/deciliter  ondansetron Injectable 4 milliGRAM(s) IV Push every 6 hours PRN Nausea      Allergies    No Known Allergies    Intolerances    REVIEW OF SYSTEMS:  CONSTITUTIONAL: No fever, weight loss, or fatigue  EYES: No eye pain, visual disturbances, or discharge  ENMT:  No difficulty hearing, tinnitus, vertigo; No sinus or throat pain  RESPIRATORY: +improved shortness of breath, No cough, wheezing, chills or hemoptysis;   CARDIOVASCULAR: +leg swelling, No chest pain, palpitations, dizziness  GASTROINTESTINAL: No abdominal or epigastric pain. No nausea, vomiting, or hematemesis; No diarrhea or constipation. No melena or hematochezia.  GENITOURINARY: No dysuria, frequency, hematuria, or incontinence  NEUROLOGICAL: +burning sensation in b/l heels, likely due to DM, No headaches, memory loss, loss of strength, numbness, or tremors  SKIN: No itching, burning, rashes, or lesions   MUSCULOSKELETAL:+b/l leg swelling, No joint pain or swelling; No muscle, back, or extremity pain  PSYCHIATRIC: No depression, anxiety, mood swings, or difficulty sleeping    Vital Signs Last 24 Hrs  T(C): 36.3 (10-05-17 @ 08:00), Max: 38 (10-04-17 @ 23:59)  T(F): 97.4 (10-05-17 @ 08:00), Max: 100.4 (10-04-17 @ 23:59)  HR: 63 (10-05-17 @ 08:00) (63 - 114)  BP: 153/76 (10-05-17 @ 08:00) (107/56 - 153/76)  BP(mean): --  RR: 16 (10-05-17 @ 08:00) (16 - 26)  SpO2: 96% (10-05-17 @ 08:00) (93% - 98%)    PHYSICAL EXAM:  GENERAL: elderly female, bedbound, lying comfortably in bed with Bipap  HEAD:  Atraumatic, Normocephalic  EYES: EOMI, PERRL, conjunctiva and sclera clear, wearing corrective lenses  ENMT: unable to visualize with Bipap machine on  NERVOUS SYSTEM:  Alert & Oriented X3, Good concentration; Motor Strength 3/5 B/L upper extremities  CHEST/LUNG: Pt on Bipap; No rales, rhonchi, wheezing, or rubs  HEART: Regular rate and rhythm; No murmurs, rubs, or gallops  ABDOMEN: Soft, Nontender, Nondistended; Bowel sounds present, no guarding, no rebound  EXTREMITIES: 2+pitting edema or b/l legs, R>L, decreased ROM of b/l lower ext, sensation in tact, +hammer toes b/l, No clubbing, cyanosis, or edema  SKIN: No rashes or lesions or heels of feet    LABS:                        11.4   4.3   )-----------( 183      ( 05 Oct 2017 05:41 )             36.4     05 Oct 2017 05:41    142    |  105    |  15     ----------------------------<  191    3.5     |  27     |  1.20     Ca    8.2        05 Oct 2017 05:41    TPro  6.3    /  Alb  2.7    /  TBili  0.5    /  DBili  .20    /  AST  12     /  ALT  11     /  AlkPhos  65     05 Oct 2017 05:41    PT/INR - ( 04 Oct 2017 20:48 )   PT: 46.0 sec;   INR: 4.10 ratio         PTT - ( 04 Oct 2017 20:48 )  PTT:40.1 sec  CAPILLARY BLOOD GLUCOSE  183 (05 Oct 2017 08:00)  161 (05 Oct 2017 02:38)        BLOOD CULTURE    RADIOLOGY & ADDITIONAL TESTS:    Imaging Personally Reviewed:  [ ] YES     Consultant(s) Notes Reviewed:      Care Discussed with Consultants/Other Providers:

## 2017-10-05 NOTE — PROGRESS NOTE ADULT - ASSESSMENT
This is an 81 y.o. 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 Admitted for ARDS, COPD exacerbation, acute CHF This is an 81 y.o. 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 Admitted for acute respiratory distress, COPD exacerbation, acute CHF This is an 81 y.o. 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 Admitted for acute respiratory distress, acute COPD exacerbation and probable exacerbation acute on chronic diastolic CHF last echo last year july with EF 60%

## 2017-10-06 DIAGNOSIS — E87.6 HYPOKALEMIA: ICD-10-CM

## 2017-10-06 LAB
ALBUMIN SERPL ELPH-MCNC: 2.8 G/DL — LOW (ref 3.3–5)
ALP SERPL-CCNC: 67 U/L — SIGNIFICANT CHANGE UP (ref 40–120)
ALT FLD-CCNC: 11 U/L — LOW (ref 12–78)
ANION GAP SERPL CALC-SCNC: 11 MMOL/L — SIGNIFICANT CHANGE UP (ref 5–17)
AST SERPL-CCNC: 8 U/L — LOW (ref 15–37)
BASE EXCESS BLDV CALC-SCNC: 1.7 MMOL/L — SIGNIFICANT CHANGE UP (ref -2–2)
BILIRUB SERPL-MCNC: 0.3 MG/DL — SIGNIFICANT CHANGE UP (ref 0.2–1.2)
BLOOD GAS COMMENTS, VENOUS: SIGNIFICANT CHANGE UP
BUN SERPL-MCNC: 23 MG/DL — SIGNIFICANT CHANGE UP (ref 7–23)
CALCIUM SERPL-MCNC: 8.3 MG/DL — LOW (ref 8.5–10.1)
CHLORIDE SERPL-SCNC: 103 MMOL/L — SIGNIFICANT CHANGE UP (ref 96–108)
CK SERPL-CCNC: 56 U/L — SIGNIFICANT CHANGE UP (ref 26–192)
CO2 SERPL-SCNC: 26 MMOL/L — SIGNIFICANT CHANGE UP (ref 22–31)
CREAT SERPL-MCNC: 1.2 MG/DL — SIGNIFICANT CHANGE UP (ref 0.5–1.3)
GLUCOSE SERPL-MCNC: 259 MG/DL — HIGH (ref 70–99)
HCO3 BLDV-SCNC: 26 MMOL/L — SIGNIFICANT CHANGE UP (ref 21–29)
HCT VFR BLD CALC: 34.4 % — LOW (ref 34.5–45)
HGB BLD-MCNC: 11 G/DL — LOW (ref 11.5–15.5)
HOROWITZ INDEX BLDV+IHG-RTO: 21 — SIGNIFICANT CHANGE UP
INR BLD: 3.51 RATIO — HIGH (ref 0.88–1.16)
MAGNESIUM SERPL-MCNC: 2.1 MG/DL — SIGNIFICANT CHANGE UP (ref 1.6–2.6)
MCHC RBC-ENTMCNC: 29.4 PG — SIGNIFICANT CHANGE UP (ref 27–34)
MCHC RBC-ENTMCNC: 32.1 GM/DL — SIGNIFICANT CHANGE UP (ref 32–36)
MCV RBC AUTO: 91.6 FL — SIGNIFICANT CHANGE UP (ref 80–100)
PCO2 BLDV: 36 MMHG — SIGNIFICANT CHANGE UP (ref 35–50)
PH BLDV: 7.46 — HIGH (ref 7.35–7.45)
PHOSPHATE SERPL-MCNC: 2.9 MG/DL — SIGNIFICANT CHANGE UP (ref 2.5–4.5)
PLATELET # BLD AUTO: 196 K/UL — SIGNIFICANT CHANGE UP (ref 150–400)
PO2 BLDV: 42 MMHG — SIGNIFICANT CHANGE UP (ref 25–45)
POTASSIUM SERPL-MCNC: 3.2 MMOL/L — LOW (ref 3.5–5.3)
POTASSIUM SERPL-SCNC: 3.2 MMOL/L — LOW (ref 3.5–5.3)
PROT SERPL-MCNC: 6.5 G/DL — SIGNIFICANT CHANGE UP (ref 6–8.3)
PROTHROM AB SERPL-ACNC: 39.2 SEC — HIGH (ref 9.8–12.7)
RBC # BLD: 3.75 M/UL — LOW (ref 3.8–5.2)
RBC # FLD: 14.6 % — HIGH (ref 10.3–14.5)
SAO2 % BLDV: 81 % — SIGNIFICANT CHANGE UP (ref 67–88)
SODIUM SERPL-SCNC: 140 MMOL/L — SIGNIFICANT CHANGE UP (ref 135–145)
TROPONIN I SERPL-MCNC: 0.12 NG/ML — HIGH (ref 0.01–0.04)
WBC # BLD: 9.1 K/UL — SIGNIFICANT CHANGE UP (ref 3.8–10.5)
WBC # FLD AUTO: 9.1 K/UL — SIGNIFICANT CHANGE UP (ref 3.8–10.5)

## 2017-10-06 PROCEDURE — 99233 SBSQ HOSP IP/OBS HIGH 50: CPT | Mod: GC

## 2017-10-06 PROCEDURE — 99233 SBSQ HOSP IP/OBS HIGH 50: CPT | Mod: 25

## 2017-10-06 PROCEDURE — 93306 TTE W/DOPPLER COMPLETE: CPT | Mod: 26

## 2017-10-06 RX ORDER — POTASSIUM CHLORIDE 20 MEQ
40 PACKET (EA) ORAL EVERY 4 HOURS
Qty: 0 | Refills: 0 | Status: COMPLETED | OUTPATIENT
Start: 2017-10-06 | End: 2017-10-06

## 2017-10-06 RX ORDER — FUROSEMIDE 40 MG
40 TABLET ORAL ONCE
Qty: 0 | Refills: 0 | Status: COMPLETED | OUTPATIENT
Start: 2017-10-06 | End: 2017-10-06

## 2017-10-06 RX ADMIN — Medication 3 MILLILITER(S): at 07:20

## 2017-10-06 RX ADMIN — Medication 40 MILLIEQUIVALENT(S): at 17:17

## 2017-10-06 RX ADMIN — Medication 0.5 MILLIGRAM(S): at 19:53

## 2017-10-06 RX ADMIN — AMLODIPINE BESYLATE 5 MILLIGRAM(S): 2.5 TABLET ORAL at 05:51

## 2017-10-06 RX ADMIN — Medication 3 MILLILITER(S): at 15:13

## 2017-10-06 RX ADMIN — Medication 40 MILLIGRAM(S): at 12:00

## 2017-10-06 RX ADMIN — Medication 3 MILLILITER(S): at 19:53

## 2017-10-06 RX ADMIN — Medication 81 MILLIGRAM(S): at 12:34

## 2017-10-06 RX ADMIN — Medication 3: at 17:13

## 2017-10-06 RX ADMIN — Medication 40 MILLIEQUIVALENT(S): at 12:40

## 2017-10-06 RX ADMIN — Medication 40 MILLIGRAM(S): at 05:51

## 2017-10-06 RX ADMIN — INSULIN GLARGINE 30 UNIT(S): 100 INJECTION, SOLUTION SUBCUTANEOUS at 21:18

## 2017-10-06 RX ADMIN — Medication 40 MILLIGRAM(S): at 12:29

## 2017-10-06 RX ADMIN — Medication 4: at 12:27

## 2017-10-06 RX ADMIN — PANTOPRAZOLE SODIUM 40 MILLIGRAM(S): 20 TABLET, DELAYED RELEASE ORAL at 12:28

## 2017-10-06 RX ADMIN — Medication 50 MILLIGRAM(S): at 17:15

## 2017-10-06 RX ADMIN — ATORVASTATIN CALCIUM 20 MILLIGRAM(S): 80 TABLET, FILM COATED ORAL at 21:18

## 2017-10-06 RX ADMIN — Medication 40 MILLIGRAM(S): at 05:46

## 2017-10-06 RX ADMIN — Medication 3 MILLILITER(S): at 11:07

## 2017-10-06 RX ADMIN — Medication 50 MICROGRAM(S): at 05:46

## 2017-10-06 RX ADMIN — Medication 50 MILLIGRAM(S): at 05:46

## 2017-10-06 RX ADMIN — Medication 0.5 MILLIGRAM(S): at 07:20

## 2017-10-06 NOTE — PROGRESS NOTE ADULT - ASSESSMENT
80 yo F PMH COPD (60 pack years, quit 3 years ago), recurrent DVT on coumadin, HTN, HLD, IDDM, known large AAA not considered a candidate for repair, presented to the ED with SOB.      - No clear evidence of acute ischemia  - Trops elevated likely from demand ischemia and not atherothrombosis  - Continue ASA 81mg in setting of likely subclinical CAD, based on her known PVD and prior EKG 2016 while vented with PNA showing SR, ST depressions  - Last echo 2016 shows normal LVEF without significant valve disease, f/u repeat ECHO  - Presentation likely in part from decompensated HF, noting edema and elevated bnp, continue Lasix 40mg IV qd  - Give 1 additional dose of 40mg IV Lasix today  - BP well controlled, continue home BP meds  - known AAA, not considered a candidate for any sort of repair  - monitor and replete lytes, keep K>4, Mg>2  - Other cardiovascular workup will depend on clinical course.  - All other workup per primary team  - Will follow 82 yo F PMH COPD (60 pack years, quit 3 years ago), recurrent DVT on coumadin, HTN, HLD, IDDM, known large AAA not considered a candidate for repair, presented to the ED with SOB.      - No clear evidence of acute ischemia  - Trops elevated likely from demand ischemia and not atherothrombosis  - Continue ASA 81mg in setting of likely subclinical CAD, based on her known PVD and prior EKG 2016 while vented with PNA showing SR, ST depressions  - Last echo 2016 shows normal LVEF without significant valve disease, f/u repeat ECHO  - Presentation likely in part from decompensated HF, noting edema and elevated bnp, continue Lasix 40mg IV qd  - Please give 1 additional dose of 40mg IV Lasix today; she may need 40 IV bid for a day or two  - BP well controlled, continue home BP meds  - known AAA, not considered a candidate for any sort of repair  - monitor and replete lytes, keep K>4, Mg>2  - Other cardiovascular workup will depend on clinical course.  - All other workup per primary team  - Will follow

## 2017-10-06 NOTE — PROGRESS NOTE ADULT - ASSESSMENT
This is an 81 y.o. 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 Admitted for acute respiratory distress, acute COPD exacerbation and probable exacerbation acute on chronic diastolic CHF last echo last year july with EF 60%. Improved SOB.

## 2017-10-06 NOTE — PROGRESS NOTE ADULT - PROBLEM SELECTOR PLAN 3
-Last TTE in July 2016 showed LV systolic function EF 60%, Aortic sclerosis, Mitral annular calcifications with moderate mitral regurgitation. Aortic aneurysm 5.6 cm noted. Patient is not a candidate for EVAR at this time.   -f/u TTE   -Cardio recs- increasing lasix to 40 mg PO, start patient on ASA 81, likely subclinical CAD

## 2017-10-06 NOTE — PROGRESS NOTE ADULT - SUBJECTIVE AND OBJECTIVE BOX
Chart reviewed: Assessment plan is as below Note will be updated as more  patient data is gathered     VITALS/LABS  10/6/2017 afeb 60 130/60 16 95% 76   10/6/2017 W 9.1 Hb 11 Plt 196  Na 140 K 3.2 CO2 26 Cr 1.2 G 259   PROBLEM ASSESSMENT PLAN  ACUTE RESP FAILURE   10/6 VBG pH 746   On BPAP 10/5/2017 10/5/.4  10/4/2017 9p 10/5/.4 748/33/101  HO RECURRENT VTE   On Coumadin  9/26/2016 CTA Ch  1) NO PE   10/1/2016 V duplex legs No dvt   RO RESP TRACT INFECTION  10/4 pct n 10/4/2017 RVP n 10/5 bc n 10/5 uc n  Abio held off   COPD   Duoneb.3p (10/5)   Solumed 40.3 (10/5)   RO MI  10/4-10/5-10/6 ck 55 Tr 1 .192 (i) - .162 (i)-.119 (i)   atorvastat 20 (10/4) metorpolol 50.2 (10/5)   HYPERTENSION  Norvasc 5 (10/5)   RO CHF   Laxix 40 (10/6)   7/24/2016 ECHO EF 60% Mod MR    COAGULOPATHY SEC COUMADIN   10/4 INR 4.1   DIABETES   Lantus 30 (10/5) sl scale (10/5)   GLOBAL ISSUE/BEST PRACTICE:        PROBLEM:      Analgesia:     na                        PROBLEM: Sedation:     na               PROBLEM: HOB elevation:   y             PROBLEM: Stress ulcer proph:    protonix 40 910/5)                       PROBLEM: VTE prophylaxis:      On coumadin poa (10/5)                   PROBLEM: Glycemic control:    On insulin (10/5)    PROBLEM: Nutrition:    cons carb renal (10/5)           PROBLEM: Advanced directive: na     PROBLEM: Allergies:  na  PATIENT DESCRIPTION CC HPI PMH SOCIAL   81 y.o. 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   HOME MEDS Duoneb norvasc 5 asa 81lipitor 20 pulmicort lasix 20 sl scale Lantus 35 levoxyl 50 metoprolol 50.2 protonix 40 warfarin 1   HOSPITAL COURSE 10/4/2017 ADMISSION [facility]   81 f HO recurrent DVT on coumadin HO HTN, HLD, IDDM HO poss Takosubo cmpthy 9/2016 was on vasopressors and inotropes 9/2016 hosp admitted 10/4/2017 with SOB   AC RESP FAILURE (HYPOXEMIC) Managed with NIV Weaned  RESP TRACT BACTERIAL INFECTION Excluded Neg pct Neg RVP   DYSPNEA Likely sec COPD Ex v ADHF although CXR showed lung fields to be clear VTE unlikely as INR was 4 on arrival  COPD EX Managed with BD steroids   TIME SPENT Over 25 minutes aggregate care time spent on encounter; activities included   direct patient care, counseling and/or coordinating care reviewing notes, lab data/ imaging , discussion with multidisciplinary team/ patient  /family. Risks, benefits, alternatives  discussed in detail. Questions/concerns  were addressed  to the best of my ability .

## 2017-10-06 NOTE — PROGRESS NOTE ADULT - SUBJECTIVE AND OBJECTIVE BOX
[facility] - Deann Kelly Grossman, [place], Sha, Esme Lam  Office Number:  [phone]    Pt seen and examined at bedside, in NAD. Denies CP, SOB, dizziness, lightheadedness, palpitations, dyspnea, orthopnea. Breathing improved.    Telemetry:      MEDICATIONS  (STANDING):  ALBUTerol/ipratropium for Nebulization 3 milliLiter(s) Nebulizer four times a day  amLODIPine   Tablet 5 milliGRAM(s) Oral daily  aspirin enteric coated 81 milliGRAM(s) Oral daily  atorvastatin 20 milliGRAM(s) Oral at bedtime  buDESOnide   0.5 milliGRAM(s) Respule 0.5 milliGRAM(s) Inhalation two times a day  dextrose 5%. 1000 milliLiter(s) (50 mL/Hr) IV Continuous <Continuous>  dextrose 50% Injectable 12.5 Gram(s) IV Push once  furosemide   Injectable 40 milliGRAM(s) IV Push daily  insulin glargine Injectable (LANTUS) 30 Unit(s) SubCutaneous at bedtime  insulin lispro (HumaLOG) corrective regimen sliding scale   SubCutaneous three times a day before meals  levothyroxine 50 MICROGram(s) Oral daily  methylPREDNISolone sodium succinate Injectable 40 milliGRAM(s) IV Push every 8 hours  metoprolol 50 milliGRAM(s) Oral two times a day  pantoprazole    Tablet 40 milliGRAM(s) Oral before breakfast  potassium chloride    Tablet ER 40 milliEquivalent(s) Oral every 4 hours    MEDICATIONS  (PRN):  acetaminophen   Tablet 650 milliGRAM(s) Oral every 6 hours PRN For Temp greater than 38 C (100.4 F)  ALBUTerol/ipratropium for Nebulization 3 milliLiter(s) Nebulizer every 8 hours PRN Respiratory Distress  dextrose Gel 1 Dose(s) Oral once PRN Blood Glucose LESS THAN 70 milliGRAM(s)/deciliter  glucagon  Injectable 1 milliGRAM(s) IntraMuscular once PRN Glucose LESS THAN 70 milligrams/deciliter  ondansetron Injectable 4 milliGRAM(s) IV Push every 6 hours PRN Nausea      Allergies    No Known Allergies    Intolerances        Vital Signs Last 24 Hrs  T(C): 36.4 (06 Oct 2017 07:45), Max: 36.7 (05 Oct 2017 16:36)  T(F): 97.5 (06 Oct 2017 07:45), Max: 98 (05 Oct 2017 16:36)  HR: 66 (06 Oct 2017 07:45) (66 - 82)  BP: 138/68 (06 Oct 2017 07:45) (112/63 - 165/69)  BP(mean): --  RR: 16 (06 Oct 2017 07:45) (16 - 16)  SpO2: 95% (06 Oct 2017 07:45) (91% - 96%)    I&O's Summary      ON EXAM:    General: NAD, awake and alert, oriented x 3  HEENT: Mucous membranes are moist, anicteric  Lungs: Non-labored, breath sounds are clear bilaterally, No wheezing, rales or rhonchi  Cardiovascular: Regular, S1 and S2, no murmurs, rubs, or gallops  Gastrointestinal: Bowel Sounds present, soft, nontender.   Lymph: 2+ pitting edema BL  Skin: No rashes or ulcers  Psych:  Mood & affect appropriate    LABS: All Labs Reviewed:                        11.0   9.1   )-----------( 196      ( 06 Oct 2017 06:52 )             34.4                         11.4   4.3   )-----------( 183      ( 05 Oct 2017 05:41 )             36.4                         13.5   7.8   )-----------( 209      ( 04 Oct 2017 20:48 )             43.1     06 Oct 2017 06:52    140    |  103    |  23     ----------------------------<  259    3.2     |  26     |  1.20   05 Oct 2017 05:41    142    |  105    |  15     ----------------------------<  191    3.5     |  27     |  1.20   04 Oct 2017 20:48    143    |  105    |  12     ----------------------------<  111    3.6     |  28     |  1.00     Ca    8.3        06 Oct 2017 06:52  Ca    8.2        05 Oct 2017 05:41  Ca    8.5        04 Oct 2017 20:48  Phos  2.9       06 Oct 2017 06:52  Mg     2.1       06 Oct 2017 06:52    TPro  6.5    /  Alb  2.8    /  TBili  0.3    /  DBili  x      /  AST  8      /  ALT  11     /  AlkPhos  67     06 Oct 2017 06:52  TPro  6.3    /  Alb  2.7    /  TBili  0.5    /  DBili  .20    /  AST  12     /  ALT  11     /  AlkPhos  65     05 Oct 2017 05:41  TPro  7.4    /  Alb  3.1    /  TBili  0.7    /  DBili  x      /  AST  14     /  ALT  11     /  AlkPhos  77     04 Oct 2017 20:48    PT/INR - ( 06 Oct 2017 06:52 )   PT: 39.2 sec;   INR: 3.51 ratio         PTT - ( 04 Oct 2017 20:48 )  PTT:40.1 sec  CARDIAC MARKERS ( 06 Oct 2017 06:52 )  .119 ng/mL / x     / 56 U/L / x     / x      CARDIAC MARKERS ( 05 Oct 2017 02:26 )  .162 ng/mL / x     / x     / x     / x      CARDIAC MARKERS ( 04 Oct 2017 20:48 )  .192 ng/mL / x     / 55 U/L / x     / 0.8 ng/mL      Blood Culture: Organism --  Gram Stain Blood -- Gram Stain --  Specimen Source .Blood Blood-Venous  Culture-Blood --    Organism --  Gram Stain Blood -- Gram Stain --  Specimen Source .Urine Clean Catch (Midstream)  Culture-Blood --      10-05 @ 05:41  Pro Bnp 1497  10-04 @ 20:48  Pro Bnp 1269    < from: 12 Lead ECG (10.04.17 @ 23:41) >    Ventricular Rate 81 BPM    Atrial Rate 81 BPM    P-R Interval 168 ms    QRS Duration 96 ms    Q-T Interval 398 ms    QTC Calculation(Bezet) 462 ms    P Axis 65 degrees    R Axis -53 degrees    T Axis 18 degrees    Diagnosis Line Normal sinus rhythm  Left anterior fascicular block  Nonspecific ST and T wave abnormality  Abnormal ECG  When compared with ECG of 04-OCT-2017 20:23, (Unconfirmed)  Vent. rate has decreased BY  43 BPM  Nonspecific T wave abnormality now evident in Anterior leads  Confirmed by Dario Villegas MD ([id number]) on 10/5/2017 12:35:59 PM    < end of copied text >    < from: Xray Chest 1 View AP- PORTABLE-Urgent (10.04.17 @ 21:12) >  EXAM:  PORTABLE CHEST URGENT                            PROCEDURE DATE:  10/04/2017          INTERPRETATION:  AP semierect chest on October 4 at 9:05 PM. Patient is   short of breath.    Poor inspiration crowds the chest.    The heart is magnified by technique.    Mild left upper lumbar curve again noted.    October 10, 2016 there were slight basilar effusions. These are no longer   evident. The lungs are presently clear.    IMPRESSION: Clear lungs at this time.                STACI SEWELL M.D.,ATTENDING RADIOLOGIST  This document has been electronically signed. Oct  5 2017  8:29AM                < end of copied text >

## 2017-10-06 NOTE — PROGRESS NOTE ADULT - PROBLEM SELECTOR PLAN 4
0.192-->0.162, likely 2/2 CHF exac vs. COPD exac 0.192-->0.162-->.119 likely 2/2 CHF exac vs. COPD exac

## 2017-10-06 NOTE — PROGRESS NOTE ADULT - SUBJECTIVE AND OBJECTIVE BOX
Patient is a 81y old  Female who presents with a chief complaint of worsening shortness of breath (05 Oct 2017 04:44)   SOB    INTERVAL HPI/OVERNIGHT EVENTS: 81 y.o. 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 Admitted for ARDS, COPD exacerbation, acute CHF. Patient seen and examined at bedside. Patient on 4L nasal cannula, saturating >88%. States that she is feeling better, decreased work of breathing. Denies fevers, chills, chest pain, sob, n/v/d, constipation diarrhea.     MEDICATIONS  (STANDING):  ALBUTerol/ipratropium for Nebulization 3 milliLiter(s) Nebulizer four times a day  amLODIPine   Tablet 5 milliGRAM(s) Oral daily  aspirin enteric coated 81 milliGRAM(s) Oral daily  atorvastatin 20 milliGRAM(s) Oral at bedtime  buDESOnide   0.5 milliGRAM(s) Respule 0.5 milliGRAM(s) Inhalation two times a day  dextrose 5%. 1000 milliLiter(s) (50 mL/Hr) IV Continuous <Continuous>  dextrose 50% Injectable 12.5 Gram(s) IV Push once  furosemide   Injectable 40 milliGRAM(s) IV Push daily  furosemide   Injectable 40 milliGRAM(s) IV Push once  insulin glargine Injectable (LANTUS) 30 Unit(s) SubCutaneous at bedtime  insulin lispro (HumaLOG) corrective regimen sliding scale   SubCutaneous three times a day before meals  levothyroxine 50 MICROGram(s) Oral daily  methylPREDNISolone sodium succinate Injectable 40 milliGRAM(s) IV Push daily  metoprolol 50 milliGRAM(s) Oral two times a day  pantoprazole    Tablet 40 milliGRAM(s) Oral before breakfast  potassium chloride    Tablet ER 40 milliEquivalent(s) Oral every 4 hours    MEDICATIONS  (PRN):  acetaminophen   Tablet 650 milliGRAM(s) Oral every 6 hours PRN For Temp greater than 38 C (100.4 F)  ALBUTerol/ipratropium for Nebulization 3 milliLiter(s) Nebulizer every 8 hours PRN Respiratory Distress  dextrose Gel 1 Dose(s) Oral once PRN Blood Glucose LESS THAN 70 milliGRAM(s)/deciliter  glucagon  Injectable 1 milliGRAM(s) IntraMuscular once PRN Glucose LESS THAN 70 milligrams/deciliter  ondansetron Injectable 4 milliGRAM(s) IV Push every 6 hours PRN Nausea    Allergies    No Known Allergies    Intolerances    REVIEW OF SYSTEMS:  CONSTITUTIONAL: No fever, weight loss, or fatigue  EYES: No eye pain, visual disturbances, or discharge  ENMT:  No difficulty hearing, tinnitus, vertigo; No sinus or throat pain  RESPIRATORY: +improved shortness of breath, No cough, wheezing, chills or hemoptysis;   CARDIOVASCULAR: +b/l leg swelling, No chest pain, palpitations, dizziness  GASTROINTESTINAL: No abdominal or epigastric pain. No nausea, vomiting, or hematemesis; No diarrhea or constipation. No melena or hematochezia.  GENITOURINARY: No dysuria, frequency, hematuria, or incontinence  NEUROLOGICAL: No headaches, memory loss, loss of strength, numbness, or tremors  SKIN: No itching, burning, rashes, or lesions   MUSCULOSKELETAL:+b/l leg swelling, No joint pain or swelling; No muscle, back, or extremity pain  PSYCHIATRIC: No depression, anxiety, mood swings, or difficulty sleeping    Vital Signs Last 24 Hrs  T(C): 36.8 (10-06-17 @ 12:00), Max: 36.8 (10-06-17 @ 12:00)  T(F): 98.2 (10-06-17 @ 12:00), Max: 98.2 (10-06-17 @ 12:00)  HR: 71 (10-06-17 @ 12:00) (66 - 82)  BP: 125/69 (10-06-17 @ 12:00) (125/69 - 157/80)  BP(mean): --  RR: 18 (10-06-17 @ 12:00) (16 - 18)  SpO2: 91% (10-06-17 @ 12:00) (91% - 95%)    PHYSICAL EXAM:  GENERAL: elderly female, bedbound, lying comfortably in bed with nasal cannula  HEAD:  Atraumatic, Normocephalic  EYES: EOMI, PERRL, conjunctiva and sclera clear, wearing corrective lenses  ENMT: moist mucous membranes, no lesions  NERVOUS SYSTEM:  Alert & Oriented X3, Good concentration; Motor Strength 3/5 B/L upper extremities, 1/5 b/l lower ext  CHEST/LUNG; No rales, rhonchi, wheezing, or rubs  HEART: Regular rate and rhythm; No murmurs, rubs, or gallops  ABDOMEN: Soft, Nontender, Nondistended; Bowel sounds present, no guarding, no rebound  EXTREMITIES: 2+pitting edema or b/l legs, R>L, decreased ROM of b/l lower ext, sensation in tact, +hammer toes b/l, No clubbing, cyanosis, or edema  SKIN: No rashes or lesions or heels of feet    LABS:                        11.0   9.1   )-----------( 196      ( 06 Oct 2017 06:52 )             34.4     06 Oct 2017 06:52    140    |  103    |  23     ----------------------------<  259    3.2     |  26     |  1.20     Ca    8.3        06 Oct 2017 06:52  Phos  2.9       06 Oct 2017 06:52  Mg     2.1       06 Oct 2017 06:52    TPro  6.5    /  Alb  2.8    /  TBili  0.3    /  DBili  x      /  AST  8      /  ALT  11     /  AlkPhos  67     06 Oct 2017 06:52    LIVER FUNCTIONS - ( 06 Oct 2017 06:52 )  Alb: 2.8 g/dL / Pro: 6.5 g/dL / ALK PHOS: 67 U/L / ALT: 11 U/L / AST: 8 U/L / GGT: x           PT/INR - ( 06 Oct 2017 06:52 )   PT: 39.2 sec;   INR: 3.51 ratio         PTT - ( 04 Oct 2017 20:48 )  PTT:40.1 sec  CAPILLARY BLOOD GLUCOSE  309 (05 Oct 2017 21:08)  139 (05 Oct 2017 17:01)    CARDIAC MARKERS ( 06 Oct 2017 06:52 )  .119 ng/mL / x     / 56 U/L / x     / x      CARDIAC MARKERS ( 05 Oct 2017 02:26 )  .162 ng/mL / x     / x     / x     / x      CARDIAC MARKERS ( 04 Oct 2017 20:48 )  .192 ng/mL / x     / 55 U/L / x     / 0.8 ng/mL    Urinalysis Basic - ( 04 Oct 2017 21:58 )    Color: Yellow / Appearance: Clear / S.010 / pH: x  Gluc: x / Ketone: Negative  / Bili: Negative / Urobili: Negative   Blood: x / Protein: Negative / Nitrite: Negative   Leuk Esterase: Trace / RBC: 0-2 /HPF / WBC 0-2   Sq Epi: x / Non Sq Epi: Occasional / Bacteria: Occasional

## 2017-10-06 NOTE — PROGRESS NOTE ADULT - ATTENDING COMMENTS
81 y.o. 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 Admitted for acute respiratory distress, acute COPD exacerbation and probable exacerbation acute on chronic diastolic CHF last echo last year july with EF 60%. Improved SOB. Plan: cont iv diuretics, f/u echo results, apprec cardio and pulm recs, monitor clinical course

## 2017-10-07 LAB
ANION GAP SERPL CALC-SCNC: 8 MMOL/L — SIGNIFICANT CHANGE UP (ref 5–17)
BUN SERPL-MCNC: 30 MG/DL — HIGH (ref 7–23)
CALCIUM SERPL-MCNC: 8.7 MG/DL — SIGNIFICANT CHANGE UP (ref 8.5–10.1)
CHLORIDE SERPL-SCNC: 104 MMOL/L — SIGNIFICANT CHANGE UP (ref 96–108)
CO2 SERPL-SCNC: 29 MMOL/L — SIGNIFICANT CHANGE UP (ref 22–31)
CREAT SERPL-MCNC: 1.3 MG/DL — SIGNIFICANT CHANGE UP (ref 0.5–1.3)
GLUCOSE SERPL-MCNC: 213 MG/DL — HIGH (ref 70–99)
HCT VFR BLD CALC: 35.2 % — SIGNIFICANT CHANGE UP (ref 34.5–45)
HGB BLD-MCNC: 11.1 G/DL — LOW (ref 11.5–15.5)
INR BLD: 3.41 RATIO — HIGH (ref 0.88–1.16)
MCHC RBC-ENTMCNC: 28.8 PG — SIGNIFICANT CHANGE UP (ref 27–34)
MCHC RBC-ENTMCNC: 31.5 GM/DL — LOW (ref 32–36)
MCV RBC AUTO: 91.4 FL — SIGNIFICANT CHANGE UP (ref 80–100)
PLATELET # BLD AUTO: 228 K/UL — SIGNIFICANT CHANGE UP (ref 150–400)
POTASSIUM SERPL-MCNC: 4.9 MMOL/L — SIGNIFICANT CHANGE UP (ref 3.5–5.3)
POTASSIUM SERPL-SCNC: 4.9 MMOL/L — SIGNIFICANT CHANGE UP (ref 3.5–5.3)
PROTHROM AB SERPL-ACNC: 38.1 SEC — HIGH (ref 9.8–12.7)
RBC # BLD: 3.85 M/UL — SIGNIFICANT CHANGE UP (ref 3.8–5.2)
RBC # FLD: 14.7 % — HIGH (ref 10.3–14.5)
SODIUM SERPL-SCNC: 141 MMOL/L — SIGNIFICANT CHANGE UP (ref 135–145)
WBC # BLD: 12.2 K/UL — HIGH (ref 3.8–10.5)
WBC # FLD AUTO: 12.2 K/UL — HIGH (ref 3.8–10.5)

## 2017-10-07 PROCEDURE — 99233 SBSQ HOSP IP/OBS HIGH 50: CPT

## 2017-10-07 RX ADMIN — Medication 3 MILLILITER(S): at 07:16

## 2017-10-07 RX ADMIN — ATORVASTATIN CALCIUM 20 MILLIGRAM(S): 80 TABLET, FILM COATED ORAL at 21:27

## 2017-10-07 RX ADMIN — PANTOPRAZOLE SODIUM 40 MILLIGRAM(S): 20 TABLET, DELAYED RELEASE ORAL at 05:09

## 2017-10-07 RX ADMIN — Medication 3: at 11:32

## 2017-10-07 RX ADMIN — AMLODIPINE BESYLATE 5 MILLIGRAM(S): 2.5 TABLET ORAL at 05:09

## 2017-10-07 RX ADMIN — Medication 0.5 MILLIGRAM(S): at 07:16

## 2017-10-07 RX ADMIN — Medication 81 MILLIGRAM(S): at 11:32

## 2017-10-07 RX ADMIN — Medication 40 MILLIGRAM(S): at 05:08

## 2017-10-07 RX ADMIN — Medication 1: at 08:03

## 2017-10-07 RX ADMIN — Medication 3 MILLILITER(S): at 11:07

## 2017-10-07 RX ADMIN — Medication 50 MILLIGRAM(S): at 05:10

## 2017-10-07 RX ADMIN — Medication 3: at 16:54

## 2017-10-07 RX ADMIN — Medication 3 MILLILITER(S): at 15:06

## 2017-10-07 RX ADMIN — Medication 0.5 MILLIGRAM(S): at 19:39

## 2017-10-07 RX ADMIN — Medication 3 MILLILITER(S): at 19:39

## 2017-10-07 RX ADMIN — Medication 50 MICROGRAM(S): at 05:09

## 2017-10-07 RX ADMIN — Medication 50 MILLIGRAM(S): at 18:00

## 2017-10-07 RX ADMIN — INSULIN GLARGINE 30 UNIT(S): 100 INJECTION, SOLUTION SUBCUTANEOUS at 22:42

## 2017-10-07 NOTE — PROGRESS NOTE ADULT - SUBJECTIVE AND OBJECTIVE BOX
Patient seen and examined today Plan discussed/Questions answered  (with patient/ancillary staff/colleagues) Chart notes reviewd More detailed Pulm/Critical Care notes, assessment and plan  will be added later today as needed after reviewing further labs as they become available     Notable interval events reviewed    ROS/PE  . REVIEW OF SYMPTOMS    Able to give ROS  Yes     RELIABLE YES   Weakness Yes   Chills No   Vision changes No  Lymph nodes No enlarged glands   Endocrine No unexplained hair loss No het or cold intolerance   Allergy No hives   Sore throat No   Coughing blood No  Headache No  Confusion No  Chest pain No  Palpitations No   Pain abdomen NO   Shortness of breath YES  Vomiting NO  Pain neck No  Pain abdomen NO     PHYSICAL EXAM    HEENT Unremarkable PERRLA atraumatic  RESP Fair air entry EXP prolonged    Harsh breath sound Resp distres mild  CARDIAC S1 S2 No S3     NO JVD   ABDOMEN SOFT BS PRESENT NOT DISTENDED No hepatosplenomegaly  PEDAL EDEMA present No calf tenderness  NO rash GENERAL Not TOXIC looking  Awake A & O x 2   . Patient seen and examined today Plan discussed/Questions answered  (with patient/ancillary staff/colleagues) Chart notes reviewd More detailed Pulm/Critical Care notes, assessment and plan  will be added later today as needed after reviewing further labs as they become available     Notable interval events reviewed    ROS/PE  . REVIEW OF SYMPTOMS    Able to give ROS  Yes     RELIABLE YES   Weakness Yes   Chills No   Vision changes No  Lymph nodes No enlarged glands   Endocrine No unexplained hair loss No het or cold intolerance   Allergy No hives   Sore throat No   Coughing blood No  Headache No  Confusion No      Chest pain No  Palpitations No   Pain abdomen NO   Shortness of breath YES  Vomiting NO  Pain neck No  Pain abdomen NO     PHYSICAL EXAM    HEENT Unremarkable PERRLA atraumatic  RESP Fair air entry EXP prolonged    Harsh breath sound Resp distres mild  CARDIAC S1 S2 No S3     NO JVD   ABDOMEN SOFT BS PRESENT NOT DISTENDED No hepatosplenomegaly  PEDAL EDEMA present No calf tenderness  NO rash GENERAL Not TOXIC looking  Awake A & O x 2   . VITALS/LABS  10/7/2017 afeb 79 120/70 16 96%   10/7/2017 W 12.2 Hb 11.1 Plt 228  Na 141 K 4.9 CO2 29 Cr 1.3 G 213   PROBLEM ASSESSMENT PLAN  ACUTE RESP FAILURE   10/6 VBG pH 746 10/7/2017 4l 96%   On BPAP 10/5/2017 10/5/.4  10/4/2017 9p 10/5/.4 748/33/101  HO RECURRENT VTE   On Coumadin  9/26/2016 CTA Ch  1) NO PE   10/1/2016 V duplex legs No dvt   RO RESP TRACT INFECTION  10/4 pct n 10/4/2017 RVP n 10/5 bc n 10/5 uc n  Abio held off   COPD   Duoneb.3p (10/5)   Solumed 40 (10/6)   RO MI  10/4-10/5-10/6 ck 55 Tr 1 .192 (i) - .162 (i)-.119 (i)   atorvastat 20 (10/4) metorpolol 50.2 (10/5)   HYPERTENSION  Norvasc 5 (10/5)   RO CHF   Laxix 40 (10/6)   10/6/2017 ECHO EF 55% RVSP 52 PASP 52   7/24/2016 ECHO EF 60% Mod MR    COAGULOPATHY SEC COUMADIN   10/4 INR 4.1   DIABETES   Lantus 30 (10/5) sl scale (10/5)   PREEXISTING ISSUES   PROBLEM:  HYPOTHYROID Levoxyl 50 (10/5)                 GLOBAL ISSUE/BEST PRACTICE:        PROBLEM:      Analgesia:     na                        PROBLEM: Sedation:     na               PROBLEM: HOB elevation:   y             PROBLEM: Stress ulcer proph:    protonix 40 910/5)                       PROBLEM: VTE prophylaxis:      On coumadin poa (10/5)                   PROBLEM: Glycemic control:    On insulin (10/5)    PROBLEM: Nutrition:    cons carb renal (10/5)           PROBLEM: Advanced directive: na     PROBLEM: Allergies:  na  PATIENT DESCRIPTION CC HPI PMH SOCIAL   81 y.o. 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   HOME MEDS Duoneb norvasc 5 asa 81lipitor 20 pulmicort lasix 20 sl scale Lantus 35 levoxyl 50 metoprolol 50.2 protonix 40 warfarin 1   HOSPITAL COURSE 10/4/2017 ADMISSION [facility]   81 f HO recurrent DVT on coumadin HO HTN, HLD, IDDM HO poss Takosubo cmpthy 9/2016 was on vasopressors and inotropes 9/2016 hosp admitted 10/4/2017 with SOB   AC RESP FAILURE (HYPOXEMIC) Managed with NIV Weaned  RESP TRACT BACTERIAL INFECTION Excluded Neg pct Neg RVP   DYSPNEA Likely sec COPD Ex v ADHF although CXR showed lung fields to be clear VTE unlikely as INR was 4 on arrival  COPD EX Managed with BD steroids   TIME SPENT Over 25 minutes aggregate care time spent on encounter; activities included   direct patient care, counseling and/or coordinating care reviewing notes, lab data/ imaging , discussion with multidisciplinary team/ patient  /family. Risks, benefits, alternatives  discussed in detail. Questions/concerns  were addressed  to the best of my ability .

## 2017-10-07 NOTE — PROGRESS NOTE ADULT - ASSESSMENT
82 yo F PMH COPD (60 pack years, quit 3 years ago), recurrent DVT on coumadin, HTN, HLD, IDDM, known large AAA not considered a candidate for repair, presented to the ED with SOB, presumably in the setting of volume overload, CHF exacerbation.      - No clear evidence of acute ischemia  - Trops elevated likely from demand ischemia and not atherothrombosis  - Continue ASA 81mg in setting of likely subclinical CAD, based on her known PVD and prior EKG 2016 while vented with PNA showing SR, ST depressions  - I have reviewed her transthoracic echocardiogram. LV function is preserved. Her mitral valve is calcified but only mild MR. There is a moderate elevation in her pulmonary pressures, which is presumably present in the setting of volume overload.  - Presentation likely in part from decompensated HF, noting edema and elevated bnp, continue Lasix 40mg IV daily. Her creatinine is trending up slightly  - her oxygen saturation is 95% on oxygen. I would consider giving her an extra Lasix 40 IV x 1.  - BP well controlled, continue home BP meds  - known AAA, not considered a candidate for any sort of repair  - monitor and replete lytes, keep K>4, Mg>2  - Other cardiovascular workup will depend on clinical course.  - All other workup per primary team  - Will follow

## 2017-10-07 NOTE — PROGRESS NOTE ADULT - SUBJECTIVE AND OBJECTIVE BOX
[facility] - Deann Kelly, Arthur, Nelly, Sha, Esme Lam  Office Number:  [phone]    Patient resting comfortably in bed in NAD.  Laying flat with no respiratory distress.  No complaints of chest pain, dyspnea, palpitations, PND, or orthopnea.  Says her breathing has improved.    ROS: negative unless otherwise mentioned.    Telemetry:  SR 70    MEDICATIONS  (STANDING):  ALBUTerol/ipratropium for Nebulization 3 milliLiter(s) Nebulizer four times a day  amLODIPine   Tablet 5 milliGRAM(s) Oral daily  aspirin enteric coated 81 milliGRAM(s) Oral daily  atorvastatin 20 milliGRAM(s) Oral at bedtime  buDESOnide   0.5 milliGRAM(s) Respule 0.5 milliGRAM(s) Inhalation two times a day  dextrose 5%. 1000 milliLiter(s) (50 mL/Hr) IV Continuous <Continuous>  dextrose 50% Injectable 12.5 Gram(s) IV Push once  furosemide   Injectable 40 milliGRAM(s) IV Push daily  insulin glargine Injectable (LANTUS) 30 Unit(s) SubCutaneous at bedtime  insulin lispro (HumaLOG) corrective regimen sliding scale   SubCutaneous three times a day before meals  levothyroxine 50 MICROGram(s) Oral daily  methylPREDNISolone sodium succinate Injectable 40 milliGRAM(s) IV Push daily  metoprolol 50 milliGRAM(s) Oral two times a day  pantoprazole    Tablet 40 milliGRAM(s) Oral before breakfast    MEDICATIONS  (PRN):  acetaminophen   Tablet 650 milliGRAM(s) Oral every 6 hours PRN For Temp greater than 38 C (100.4 F)  ALBUTerol/ipratropium for Nebulization 3 milliLiter(s) Nebulizer every 8 hours PRN Respiratory Distress  dextrose Gel 1 Dose(s) Oral once PRN Blood Glucose LESS THAN 70 milliGRAM(s)/deciliter  glucagon  Injectable 1 milliGRAM(s) IntraMuscular once PRN Glucose LESS THAN 70 milligrams/deciliter  ondansetron Injectable 4 milliGRAM(s) IV Push every 6 hours PRN Nausea      Allergies    No Known Allergies    Intolerances        Vital Signs Last 24 Hrs  T(C): 36.6 (07 Oct 2017 08:00), Max: 36.8 (06 Oct 2017 12:00)  T(F): 97.8 (07 Oct 2017 08:00), Max: 98.3 (06 Oct 2017 19:38)  HR: 64 (07 Oct 2017 08:00) (64 - 110)  BP: 166/76 (07 Oct 2017 08:00) (119/70 - 166/76)  BP(mean): --  RR: 17 (07 Oct 2017 08:00) (17 - 18)  SpO2: 95% (07 Oct 2017 08:00) (91% - 96%)    I&O's Summary    06 Oct 2017 07:01  -  07 Oct 2017 07:00  --------------------------------------------------------  IN: 150 mL / OUT: 0 mL / NET: 150 mL        ON EXAM:    General: NAD, awake and alert, oriented x 3  HEENT: Mucous membranes are moist, anicteric  Lungs: Non-labored, crackles at lung bases bilaterally  Cardiovascular: Regular, S1 and S2, no murmurs, rubs, or gallops  Gastrointestinal: Bowel Sounds present, soft, nontender.   Lymph: 2+ pitting edema BL  Skin: No rashes or ulcers  Psych:  Mood & affect appropriate      LABS: All Labs Reviewed:                        11.1   12.2  )-----------( 228      ( 07 Oct 2017 06:23 )             35.2                         11.0   9.1   )-----------( 196      ( 06 Oct 2017 06:52 )             34.4                         11.4   4.3   )-----------( 183      ( 05 Oct 2017 05:41 )             36.4     07 Oct 2017 06:23    141    |  104    |  30     ----------------------------<  213    4.9     |  29     |  1.30   06 Oct 2017 06:52    140    |  103    |  23     ----------------------------<  259    3.2     |  26     |  1.20   05 Oct 2017 05:41    142    |  105    |  15     ----------------------------<  191    3.5     |  27     |  1.20     Ca    8.7        07 Oct 2017 06:23  Ca    8.3        06 Oct 2017 06:52  Ca    8.2        05 Oct 2017 05:41  Phos  2.9       06 Oct 2017 06:52  Mg     2.1       06 Oct 2017 06:52    TPro  6.5    /  Alb  2.8    /  TBili  0.3    /  DBili  x      /  AST  8      /  ALT  11     /  AlkPhos  67     06 Oct 2017 06:52  TPro  6.3    /  Alb  2.7    /  TBili  0.5    /  DBili  .20    /  AST  12     /  ALT  11     /  AlkPhos  65     05 Oct 2017 05:41  TPro  7.4    /  Alb  3.1    /  TBili  0.7    /  DBili  x      /  AST  14     /  ALT  11     /  AlkPhos  77     04 Oct 2017 20:48    PT/INR - ( 07 Oct 2017 06:23 )   PT: 38.1 sec;   INR: 3.41 ratio           CARDIAC MARKERS ( 06 Oct 2017 06:52 )  .119 ng/mL / x     / 56 U/L / x     / x          Blood Culture: Organism --  Gram Stain Blood -- Gram Stain --  Specimen Source .Blood Blood-Venous  Culture-Blood --    Organism --  Gram Stain Blood -- Gram Stain --  Specimen Source .Urine Clean Catch (Midstream)  Culture-Blood --      10-05 @ 05:41  Pro Bnp 1497  10-04 @ 20:48  Pro Bnp 1269

## 2017-10-07 NOTE — PROGRESS NOTE ADULT - PROBLEM SELECTOR PLAN 8
H/o DVT x2, currently on warfarin at home but is supra therapeutic in hospital. Last dose was 10/3 PM. Warfarin held on 10/4 for INR of 4.1. Will hold today.   - Home regimen is 1mg on M, T, W and 2 mg on Th, F, Sat, Sun.

## 2017-10-08 LAB
ALBUMIN SERPL ELPH-MCNC: 2.9 G/DL — LOW (ref 3.3–5)
ALP SERPL-CCNC: 69 U/L — SIGNIFICANT CHANGE UP (ref 40–120)
ALT FLD-CCNC: 13 U/L — SIGNIFICANT CHANGE UP (ref 12–78)
ANION GAP SERPL CALC-SCNC: 7 MMOL/L — SIGNIFICANT CHANGE UP (ref 5–17)
AST SERPL-CCNC: 9 U/L — LOW (ref 15–37)
BILIRUB SERPL-MCNC: 0.3 MG/DL — SIGNIFICANT CHANGE UP (ref 0.2–1.2)
BUN SERPL-MCNC: 35 MG/DL — HIGH (ref 7–23)
CALCIUM SERPL-MCNC: 8.7 MG/DL — SIGNIFICANT CHANGE UP (ref 8.5–10.1)
CHLORIDE SERPL-SCNC: 101 MMOL/L — SIGNIFICANT CHANGE UP (ref 96–108)
CO2 SERPL-SCNC: 31 MMOL/L — SIGNIFICANT CHANGE UP (ref 22–31)
CREAT SERPL-MCNC: 1.1 MG/DL — SIGNIFICANT CHANGE UP (ref 0.5–1.3)
GLUCOSE SERPL-MCNC: 182 MG/DL — HIGH (ref 70–99)
HCT VFR BLD CALC: 37 % — SIGNIFICANT CHANGE UP (ref 34.5–45)
HGB BLD-MCNC: 11.8 G/DL — SIGNIFICANT CHANGE UP (ref 11.5–15.5)
INR BLD: 2.87 RATIO — HIGH (ref 0.88–1.16)
MCHC RBC-ENTMCNC: 29.2 PG — SIGNIFICANT CHANGE UP (ref 27–34)
MCHC RBC-ENTMCNC: 31.9 GM/DL — LOW (ref 32–36)
MCV RBC AUTO: 91.4 FL — SIGNIFICANT CHANGE UP (ref 80–100)
PLATELET # BLD AUTO: 252 K/UL — SIGNIFICANT CHANGE UP (ref 150–400)
POTASSIUM SERPL-MCNC: 3.9 MMOL/L — SIGNIFICANT CHANGE UP (ref 3.5–5.3)
POTASSIUM SERPL-SCNC: 3.9 MMOL/L — SIGNIFICANT CHANGE UP (ref 3.5–5.3)
PROT SERPL-MCNC: 6.4 G/DL — SIGNIFICANT CHANGE UP (ref 6–8.3)
PROTHROM AB SERPL-ACNC: 32 SEC — HIGH (ref 9.8–12.7)
RBC # BLD: 4.04 M/UL — SIGNIFICANT CHANGE UP (ref 3.8–5.2)
RBC # FLD: 14.5 % — SIGNIFICANT CHANGE UP (ref 10.3–14.5)
SODIUM SERPL-SCNC: 139 MMOL/L — SIGNIFICANT CHANGE UP (ref 135–145)
WBC # BLD: 10.5 K/UL — SIGNIFICANT CHANGE UP (ref 3.8–10.5)
WBC # FLD AUTO: 10.5 K/UL — SIGNIFICANT CHANGE UP (ref 3.8–10.5)

## 2017-10-08 PROCEDURE — 99233 SBSQ HOSP IP/OBS HIGH 50: CPT

## 2017-10-08 RX ORDER — WARFARIN SODIUM 2.5 MG/1
2 TABLET ORAL ONCE
Qty: 0 | Refills: 0 | Status: COMPLETED | OUTPATIENT
Start: 2017-10-08 | End: 2017-10-08

## 2017-10-08 RX ADMIN — WARFARIN SODIUM 2 MILLIGRAM(S): 2.5 TABLET ORAL at 22:41

## 2017-10-08 RX ADMIN — Medication 3: at 12:08

## 2017-10-08 RX ADMIN — Medication 81 MILLIGRAM(S): at 12:08

## 2017-10-08 RX ADMIN — Medication 50 MILLIGRAM(S): at 18:04

## 2017-10-08 RX ADMIN — Medication 3 MILLILITER(S): at 07:19

## 2017-10-08 RX ADMIN — Medication 3 MILLILITER(S): at 15:13

## 2017-10-08 RX ADMIN — Medication 4: at 16:44

## 2017-10-08 RX ADMIN — Medication 50 MILLIGRAM(S): at 05:59

## 2017-10-08 RX ADMIN — PANTOPRAZOLE SODIUM 40 MILLIGRAM(S): 20 TABLET, DELAYED RELEASE ORAL at 05:59

## 2017-10-08 RX ADMIN — Medication 0.5 MILLIGRAM(S): at 20:28

## 2017-10-08 RX ADMIN — Medication 50 MICROGRAM(S): at 05:59

## 2017-10-08 RX ADMIN — ATORVASTATIN CALCIUM 20 MILLIGRAM(S): 80 TABLET, FILM COATED ORAL at 22:41

## 2017-10-08 RX ADMIN — INSULIN GLARGINE 30 UNIT(S): 100 INJECTION, SOLUTION SUBCUTANEOUS at 22:41

## 2017-10-08 RX ADMIN — Medication 3 MILLILITER(S): at 20:28

## 2017-10-08 RX ADMIN — AMLODIPINE BESYLATE 5 MILLIGRAM(S): 2.5 TABLET ORAL at 05:59

## 2017-10-08 RX ADMIN — Medication 40 MILLIGRAM(S): at 06:00

## 2017-10-08 RX ADMIN — Medication 3 MILLILITER(S): at 11:14

## 2017-10-08 RX ADMIN — Medication 0.5 MILLIGRAM(S): at 07:19

## 2017-10-08 NOTE — PROGRESS NOTE ADULT - SUBJECTIVE AND OBJECTIVE BOX
Patient is a 82y old  Female who presents with a chief complaint of worsening shortness of breath (05 Oct 2017 16:50)        HPI:  This is an 81 y.o. 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 She states it started one week prior when she had non bloody diarrhea for two days. THe dyspnea has progressively getting worse since then. Patient uses 3 L NC O2 at home. She tried using Duoneb x3 at home but it did not help, nor did the 3L of O2. Patient denies any fever, chills,  n/v, palpitations, diaphoresis. She also admits to increased swelling of both her feet and increased lethargy today.    In the ED, vitals were Temp: 99.8F   /124 RR 24  SpO2: 86% RA. Sig labs include WBC 7.8, INR 4.10, PTT 40.1, PT 46, lactate 3.6, glucose 111, albumin 3.1,procalc <0.05, trop 0.192, pro BNP 1269. ABG showed pH 7.48, pCO2 33, HCO3 26. UA showed trace leuk est, neg nitrate. CXR ordered. EKG ordered. Patient put on BiPap. Gave solu Medrol 125 mg IV one dose, metoprolol 5 mg IV, 1 L IVF NS bolus, Duonebs x1. (04 Oct 2017 23:52)      SUBJECTIVE & OBJECTIVE: Pt seen and examined at bedside.     PHYSICAL EXAM:  T(C): 36.5 (10-08-17 @ 08:00), Max: 37.1 (10-07-17 @ 20:16)  HR: 64 (10-08-17 @ 08:00) (63 - 82)  BP: 142/79 (10-08-17 @ 08:00) (105/63 - 155/77)  RR: 16 (10-08-17 @ 08:00) (16 - 17)  SpO2: 95% (10-08-17 @ 08:00) (91% - 96%)  Wt(kg): --   GENERAL: NAD, well-groomed, well-developed  HEAD:  Atraumatic, Normocephalic  EYES: EOMI, PERRLA, conjunctiva and sclera clear  ENMT: Moist mucous membranes  NECK: Supple, No JVD  NERVOUS SYSTEM:  Alert & Oriented X3, Motor Strength 5/5 B/L upper and lower extremities; DTRs 2+ intact and symmetric  CHEST/LUNG: Clear to auscultation bilaterally; No rales, rhonchi, wheezing, or rubs  HEART: Regular rate and rhythm; No murmurs, rubs, or gallops  ABDOMEN: Soft, Nontender, Nondistended; Bowel sounds present  EXTREMITIES:  2+ Peripheral Pulses, No clubbing, cyanosis, or edema        MEDICATIONS  (STANDING):  ALBUTerol/ipratropium for Nebulization 3 milliLiter(s) Nebulizer four times a day  amLODIPine   Tablet 5 milliGRAM(s) Oral daily  aspirin enteric coated 81 milliGRAM(s) Oral daily  atorvastatin 20 milliGRAM(s) Oral at bedtime  buDESOnide   0.5 milliGRAM(s) Respule 0.5 milliGRAM(s) Inhalation two times a day  dextrose 5%. 1000 milliLiter(s) (50 mL/Hr) IV Continuous <Continuous>  dextrose 50% Injectable 12.5 Gram(s) IV Push once  furosemide   Injectable 40 milliGRAM(s) IV Push daily  insulin glargine Injectable (LANTUS) 30 Unit(s) SubCutaneous at bedtime  insulin lispro (HumaLOG) corrective regimen sliding scale   SubCutaneous three times a day before meals  levothyroxine 50 MICROGram(s) Oral daily  methylPREDNISolone sodium succinate Injectable 40 milliGRAM(s) IV Push daily  metoprolol 50 milliGRAM(s) Oral two times a day  pantoprazole    Tablet 40 milliGRAM(s) Oral before breakfast    MEDICATIONS  (PRN):  acetaminophen   Tablet 650 milliGRAM(s) Oral every 6 hours PRN For Temp greater than 38 C (100.4 F)  ALBUTerol/ipratropium for Nebulization 3 milliLiter(s) Nebulizer every 8 hours PRN Respiratory Distress  dextrose Gel 1 Dose(s) Oral once PRN Blood Glucose LESS THAN 70 milliGRAM(s)/deciliter  glucagon  Injectable 1 milliGRAM(s) IntraMuscular once PRN Glucose LESS THAN 70 milligrams/deciliter  ondansetron Injectable 4 milliGRAM(s) IV Push every 6 hours PRN Nausea      LABS:                        11.8   10.5  )-----------( 252      ( 08 Oct 2017 06:34 )             37.0     10-08    139  |  101  |  35<H>  ----------------------------<  182<H>  3.9   |  31  |  1.10    Ca    8.7      08 Oct 2017 06:34    TPro  6.4  /  Alb  2.9<L>  /  TBili  0.3  /  DBili  x   /  AST  9<L>  /  ALT  13  /  AlkPhos  69  10-08    PT/INR - ( 08 Oct 2017 06:34 )   PT: 32.0 sec;   INR: 2.87 ratio               CAPILLARY BLOOD GLUCOSE  349 (07 Oct 2017 22:39)  295 (07 Oct 2017 17:24)  291 (07 Oct 2017 11:22)          CAPILLARY BLOOD GLUCOSE  349 (07 Oct 2017 22:39)  295 (07 Oct 2017 17:24)  291 (07 Oct 2017 11:22)        CAPILLARY BLOOD GLUCOSE  349 (07 Oct 2017 22:39)                RECENT CULTURES:      RADIOLOGY & ADDITIONAL TESTS:                        DVT/GI ppx  Discussed with pt @ bedside

## 2017-10-08 NOTE — PROGRESS NOTE ADULT - SUBJECTIVE AND OBJECTIVE BOX
[facility] - Deann Kelly, Arthur, Nelly, Sha, Esme Lam  Office Number:  [phone]    Patient resting comfortably in bed in NAD.  Laying flat with no respiratory distress.  No complaints of chest pain, dyspnea, palpitations, PND, or orthopnea.  Says her breathing has improved.     ROS: negative unless otherwise mentioned.    Telemetry:  SB, 1st av block, rare PVCs    MEDICATIONS  (STANDING):  ALBUTerol/ipratropium for Nebulization 3 milliLiter(s) Nebulizer four times a day  amLODIPine   Tablet 5 milliGRAM(s) Oral daily  aspirin enteric coated 81 milliGRAM(s) Oral daily  atorvastatin 20 milliGRAM(s) Oral at bedtime  buDESOnide   0.5 milliGRAM(s) Respule 0.5 milliGRAM(s) Inhalation two times a day  dextrose 5%. 1000 milliLiter(s) (50 mL/Hr) IV Continuous <Continuous>  dextrose 50% Injectable 12.5 Gram(s) IV Push once  furosemide   Injectable 40 milliGRAM(s) IV Push daily  insulin glargine Injectable (LANTUS) 30 Unit(s) SubCutaneous at bedtime  insulin lispro (HumaLOG) corrective regimen sliding scale   SubCutaneous three times a day before meals  levothyroxine 50 MICROGram(s) Oral daily  methylPREDNISolone sodium succinate Injectable 40 milliGRAM(s) IV Push daily  metoprolol 50 milliGRAM(s) Oral two times a day  pantoprazole    Tablet 40 milliGRAM(s) Oral before breakfast    MEDICATIONS  (PRN):  acetaminophen   Tablet 650 milliGRAM(s) Oral every 6 hours PRN For Temp greater than 38 C (100.4 F)  ALBUTerol/ipratropium for Nebulization 3 milliLiter(s) Nebulizer every 8 hours PRN Respiratory Distress  dextrose Gel 1 Dose(s) Oral once PRN Blood Glucose LESS THAN 70 milliGRAM(s)/deciliter  glucagon  Injectable 1 milliGRAM(s) IntraMuscular once PRN Glucose LESS THAN 70 milligrams/deciliter  ondansetron Injectable 4 milliGRAM(s) IV Push every 6 hours PRN Nausea      Allergies    No Known Allergies    Intolerances        Vital Signs Last 24 Hrs  T(C): 36.5 (08 Oct 2017 08:00), Max: 37.1 (07 Oct 2017 20:16)  T(F): 97.7 (08 Oct 2017 08:00), Max: 98.7 (07 Oct 2017 20:16)  HR: 64 (08 Oct 2017 08:00) (63 - 82)  BP: 142/79 (08 Oct 2017 08:00) (105/63 - 155/77)  BP(mean): --  RR: 16 (08 Oct 2017 08:00) (16 - 17)  SpO2: 95% (08 Oct 2017 08:00) (91% - 96%)    I&O's Summary      ON EXAM:    General: NAD, awake and alert, oriented x 3, on breathing treatment during exam  HEENT: Mucous membranes are moist, anicteric  Lungs: Non-labored, crackles at lung bases bilaterally  Cardiovascular: Regular, S1 and S2, no murmurs, rubs, or gallops  Gastrointestinal: Bowel Sounds present, soft, nontender.   Lymph: 2+ pitting edema BL  Skin: No rashes or ulcers  Psych:  Mood & affect appropriate    LABS: All Labs Reviewed:                        11.8   10.5  )-----------( 252      ( 08 Oct 2017 06:34 )             37.0                         11.1   12.2  )-----------( 228      ( 07 Oct 2017 06:23 )             35.2                         11.0   9.1   )-----------( 196      ( 06 Oct 2017 06:52 )             34.4     08 Oct 2017 06:34    139    |  101    |  35     ----------------------------<  182    3.9     |  31     |  1.10   07 Oct 2017 06:23    141    |  104    |  30     ----------------------------<  213    4.9     |  29     |  1.30   06 Oct 2017 06:52    140    |  103    |  23     ----------------------------<  259    3.2     |  26     |  1.20     Ca    8.7        08 Oct 2017 06:34  Ca    8.7        07 Oct 2017 06:23  Ca    8.3        06 Oct 2017 06:52  Phos  2.9       06 Oct 2017 06:52  Mg     2.1       06 Oct 2017 06:52    TPro  6.4    /  Alb  2.9    /  TBili  0.3    /  DBili  x      /  AST  9      /  ALT  13     /  AlkPhos  69     08 Oct 2017 06:34  TPro  6.5    /  Alb  2.8    /  TBili  0.3    /  DBili  x      /  AST  8      /  ALT  11     /  AlkPhos  67     06 Oct 2017 06:52    PT/INR - ( 08 Oct 2017 06:34 )   PT: 32.0 sec;   INR: 2.87 ratio               Blood Culture: Organism --  Gram Stain Blood -- Gram Stain --  Specimen Source .Blood Blood-Venous  Culture-Blood --    Organism --  Gram Stain Blood -- Gram Stain --  Specimen Source .Urine Clean Catch (Midstream)  Culture-Blood --

## 2017-10-08 NOTE — PROGRESS NOTE ADULT - SUBJECTIVE AND OBJECTIVE BOX
Chart reviewed: Assessment plan is as below Note will be updated as more  patient data is gathered     REVIEW OF SYMPTOMS    Able to give ROS  Yes     RELIABLE YES   Weakness Yes   Chills No   Vision changes No  Lymph nodes No enlarged glands   Endocrine No unexplained hair loss No het or cold intolerance   Allergy No hives   Sore throat No   Coughing blood No  Headache No  Confusion No  Chest pain No  Palpitations No   Pain abdomen NO   Shortness of breath YES  Vomiting NO  Pain neck No  Pain abdomen NO     PHYSICAL EXAM    HEENT Unremarkable PERRLA atraumatic  RESP Fair air entry EXP prolonged    Harsh breath sound Resp distres mild  CARDIAC S1 S2 No S3     NO JVD   ABDOMEN SOFT BS PRESENT NOT DISTENDED No hepatosplenomegaly  PEDAL EDEMA present No calf tenderness  NO rash GENERAL Not TOXIC looking  Awake A & O x 3   . Chart reviewed: Assessment plan is as below Note will be updated as more  patient data is gathered     REVIEW OF SYMPTOMS    Able to give ROS  Yes     RELIABLE YES   Weakness Yes   Chills No   Vision changes No  Lymph nodes No enlarged glands   Endocrine No unexplained hair loss No het or cold intolerance   Allergy No hives   Sore throat No   Coughing blood No  Headache No  Confusion No  Chest pain No  Palpitations No   Pain abdomen NO   Shortness of breath YES  Vomiting NO  Pain neck No  Pain abdomen NO     PHYSICAL EXAM    HEENT Unremarkable PERRLA atraumatic  RESP Fair air entry EXP prolonged    Harsh breath sound Resp distres mild  CARDIAC S1 S2 No S3     NO JVD   ABDOMEN SOFT BS PRESENT NOT DISTENDED No hepatosplenomegaly  PEDAL EDEMA present No calf tenderness  NO rash GENERAL Not TOXIC looking  Awake A & O x 3   . VITALS/LABS  10/8/2017 afeb 82 120/70 17 92% 76   10/8/2017 W 10,5 b 11.8 Plt 252  Na 139 K 3.9 CO2 31 Cr 1.1 G 182   PROBLEM ASSESSMENT PLAN  ACUTE RESP FAILURE   10/6 VBG pH 746 10/7/2017 4l 96%   On BPAP 10/5/2017 10/5/.4  10/4/2017 9p 10/5/.4 748/33/101  HO RECURRENT VTE   On Coumadin  9/26/2016 CTA Ch  1) NO PE   10/1/2016 V duplex legs No dvt   RO RESP TRACT INFECTION  10/4 pct n 10/4/2017 RVP n 10/5 bc n 10/5 uc n  Abio held off   COPD   Duoneb.3p (10/5)   Pred 40 (10/8)   RO MI  10/4-10/5-10/6 ck 55 Tr 1 .192 (i) - .162 (i)-.119 (i)   atorvastat 20 (10/4) metorpolol 50.2 (10/5)   HYPERTENSION  Norvasc 5 (10/5)   RO CHF   Laxix 40 (10/6)   10/6/2017 ECHO EF 55% RVSP 52 PASP 52   7/24/2016 ECHO EF 60% Mod MR    COAGULOPATHY SEC COUMADIN   10/4 INR 4.1   DIABETES   Lantus 30 (10/5) sl scale (10/5)   GLOBAL ISSUE/BEST PRACTICE:        PROBLEM:      Analgesia:     na                        PROBLEM: Sedation:     na               PROBLEM: HOB elevation:   y             PROBLEM: Stress ulcer proph:    protonix 40 910/5)                       PROBLEM: VTE prophylaxis:      On coumadin poa (10/5)                   PROBLEM: Glycemic control:    On insulin (10/5)    PROBLEM: Nutrition:    cons carb renal (10/5)           PROBLEM: Advanced directive: na     PROBLEM: Allergies:  na  TIME SPENT Over 25 minutes aggregate care time spent on encounter; activities included   direct patient care, counseling and/or coordinating care reviewing notes, lab data/ imaging , discussion with multidisciplinary team/ patient  /family. Risks, benefits, alternatives  discussed in detail. Questions/concerns  were addressed  to the best of my ability .

## 2017-10-08 NOTE — PROGRESS NOTE ADULT - ASSESSMENT
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      - Shortness of breath seems to have improved with COPD and CHF treatment  - No clear evidence of acute ischemia  - Troponins elevated likely from demand ischemia and not atherothrombosis  - Continue ASA 81mg in setting of likely subclinical CAD, based on her known PVD and prior EKG 2016 while vented with PNA showing SR, ST depressions  - I have reviewed her transthoracic echocardiogram. LV function is preserved. Her mitral valve is calcified but only mild MR. There is a moderate elevation in her pulmonary pressures, which is presumably present in the setting of volume overload.  - Presentation likely in part from decompensated HF, noting edema and elevated bnp, continue Lasix 40mg IV daily. Her creatinine seems to have stabilized. Will re-evaluate tomorrow morning to see if we can switch to po Lasix  - BP well controlled, continue home BP meds of Norvasc and Metoprolol  - known AAA, not considered a candidate for any sort of repair  - monitor and replete lytes, keep K>4, Mg>2  - Other cardiovascular workup will depend on clinical course.  - All other workup per primary team  - Will follow

## 2017-10-08 NOTE — PROGRESS NOTE ADULT - PROBLEM SELECTOR PLAN 10
Repleted 40meq PO x2  - f/u AM BMP

## 2017-10-09 LAB
ALBUMIN SERPL ELPH-MCNC: 3.2 G/DL — LOW (ref 3.3–5)
ALP SERPL-CCNC: 78 U/L — SIGNIFICANT CHANGE UP (ref 40–120)
ALT FLD-CCNC: 21 U/L — SIGNIFICANT CHANGE UP (ref 12–78)
ANION GAP SERPL CALC-SCNC: 6 MMOL/L — SIGNIFICANT CHANGE UP (ref 5–17)
AST SERPL-CCNC: 14 U/L — LOW (ref 15–37)
BILIRUB SERPL-MCNC: 0.3 MG/DL — SIGNIFICANT CHANGE UP (ref 0.2–1.2)
BUN SERPL-MCNC: 41 MG/DL — HIGH (ref 7–23)
CALCIUM SERPL-MCNC: 9.4 MG/DL — SIGNIFICANT CHANGE UP (ref 8.5–10.1)
CHLORIDE SERPL-SCNC: 98 MMOL/L — SIGNIFICANT CHANGE UP (ref 96–108)
CO2 SERPL-SCNC: 35 MMOL/L — HIGH (ref 22–31)
CREAT SERPL-MCNC: 1.3 MG/DL — SIGNIFICANT CHANGE UP (ref 0.5–1.3)
GLUCOSE SERPL-MCNC: 205 MG/DL — HIGH (ref 70–99)
INR BLD: 2.19 RATIO — HIGH (ref 0.88–1.16)
POTASSIUM SERPL-MCNC: 4.6 MMOL/L — SIGNIFICANT CHANGE UP (ref 3.5–5.3)
POTASSIUM SERPL-SCNC: 4.6 MMOL/L — SIGNIFICANT CHANGE UP (ref 3.5–5.3)
PROT SERPL-MCNC: 6.7 G/DL — SIGNIFICANT CHANGE UP (ref 6–8.3)
PROTHROM AB SERPL-ACNC: 24.3 SEC — HIGH (ref 9.8–12.7)
SODIUM SERPL-SCNC: 139 MMOL/L — SIGNIFICANT CHANGE UP (ref 135–145)

## 2017-10-09 PROCEDURE — 99233 SBSQ HOSP IP/OBS HIGH 50: CPT

## 2017-10-09 RX ORDER — INSULIN LISPRO 100/ML
VIAL (ML) SUBCUTANEOUS
Qty: 0 | Refills: 0 | Status: DISCONTINUED | OUTPATIENT
Start: 2017-10-09 | End: 2017-10-10

## 2017-10-09 RX ORDER — FUROSEMIDE 40 MG
40 TABLET ORAL DAILY
Qty: 0 | Refills: 0 | Status: DISCONTINUED | OUTPATIENT
Start: 2017-10-10 | End: 2017-10-10

## 2017-10-09 RX ORDER — WARFARIN SODIUM 2.5 MG/1
1 TABLET ORAL ONCE
Qty: 0 | Refills: 0 | Status: COMPLETED | OUTPATIENT
Start: 2017-10-09 | End: 2017-10-09

## 2017-10-09 RX ORDER — INSULIN LISPRO 100/ML
VIAL (ML) SUBCUTANEOUS AT BEDTIME
Qty: 0 | Refills: 0 | Status: DISCONTINUED | OUTPATIENT
Start: 2017-10-09 | End: 2017-10-10

## 2017-10-09 RX ORDER — INSULIN LISPRO 100/ML
4 VIAL (ML) SUBCUTANEOUS ONCE
Qty: 0 | Refills: 0 | Status: COMPLETED | OUTPATIENT
Start: 2017-10-09 | End: 2017-10-09

## 2017-10-09 RX ADMIN — Medication 1: at 12:16

## 2017-10-09 RX ADMIN — INSULIN GLARGINE 30 UNIT(S): 100 INJECTION, SOLUTION SUBCUTANEOUS at 21:33

## 2017-10-09 RX ADMIN — Medication 50 MICROGRAM(S): at 05:36

## 2017-10-09 RX ADMIN — Medication 0.5 MILLIGRAM(S): at 07:40

## 2017-10-09 RX ADMIN — Medication 30 MILLIGRAM(S): at 17:46

## 2017-10-09 RX ADMIN — Medication 50 MILLIGRAM(S): at 05:36

## 2017-10-09 RX ADMIN — WARFARIN SODIUM 1 MILLIGRAM(S): 2.5 TABLET ORAL at 21:33

## 2017-10-09 RX ADMIN — PANTOPRAZOLE SODIUM 40 MILLIGRAM(S): 20 TABLET, DELAYED RELEASE ORAL at 05:35

## 2017-10-09 RX ADMIN — Medication 6: at 17:43

## 2017-10-09 RX ADMIN — Medication 3 MILLILITER(S): at 15:43

## 2017-10-09 RX ADMIN — ATORVASTATIN CALCIUM 20 MILLIGRAM(S): 80 TABLET, FILM COATED ORAL at 21:33

## 2017-10-09 RX ADMIN — Medication 50 MILLIGRAM(S): at 17:46

## 2017-10-09 RX ADMIN — Medication 3 MILLILITER(S): at 11:25

## 2017-10-09 RX ADMIN — Medication 3 MILLILITER(S): at 20:09

## 2017-10-09 RX ADMIN — Medication 4 UNIT(S): at 18:18

## 2017-10-09 RX ADMIN — Medication 81 MILLIGRAM(S): at 12:16

## 2017-10-09 RX ADMIN — Medication 40 MILLIGRAM(S): at 05:36

## 2017-10-09 RX ADMIN — AMLODIPINE BESYLATE 5 MILLIGRAM(S): 2.5 TABLET ORAL at 05:36

## 2017-10-09 RX ADMIN — Medication 3 MILLILITER(S): at 07:39

## 2017-10-09 RX ADMIN — Medication 2: at 09:08

## 2017-10-09 RX ADMIN — Medication 0.5 MILLIGRAM(S): at 20:05

## 2017-10-09 NOTE — PROGRESS NOTE ADULT - SUBJECTIVE AND OBJECTIVE BOX
Patient is a 82y old  Female who presents with a chief complaint of worsening shortness of breath (05 Oct 2017 16:50)    HPI: This is an 81 y.o. 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 She states it started one week prior when she had non bloody diarrhea for two days. The dyspnea has progressively getting worse since then. Patient uses 3 L NC O2 at home. She tried using Duoneb x3 at home but it did not help, nor did the 3L of O2. Patient denies any fever, chills,  n/v, palpitations, diaphoresis. She also admits to increased swelling of both her feet and increased lethargy today.    SUBJECTIVE & OBJECTIVE: Pt seen and examined at bedside. Patient states her breathing and leg swelling has improved. Denies fevers, chills, n/v/d, abdominal pain.     ROS:   Constitutional: denies fever, chills, diaphoresis   HEENT: denies blurry vision, difficulty hearing  Respiratory: denies SOB, HIDALGO, cough, sputum production, wheezing, hemoptysis  Cardiovascular: denies CP, palpitations, edema  Gastrointestinal: denies nausea, vomiting, diarrhea, constipation, abdominal pain, melena, hematochezia   Genitourinary: denies dysuria, frequency, urgency, hematuria   Skin/Breast: denies rash, itching  Musculoskeletal: +b/l leg swelling, denies myalgias, joint swelling, muscle weakness  Neurologic: denies headache, weakness, dizziness, paresthesias, numbness/tingling  Psychiatric: denies feeling anxious, depressed    Physical Exam:   Vital Signs Last 24 Hrs  T(C): 36.7 (10-09-17 @ 07:22), Max: 36.9 (10-09-17 @ 04:32)  T(F): 98 (10-09-17 @ 07:22), Max: 98.4 (10-09-17 @ 04:32)  HR: 60 (10-09-17 @ 07:22) (60 - 66)  BP: 110/69 (10-09-17 @ 07:22) (107/67 - 157/76)  BP(mean): --  RR: 17 (10-09-17 @ 07:22) (16 - 17)  SpO2: 99% (10-09-17 @ 07:22) (96% - 99%)    GENERAL: NAD, well-groomed, obese, elderly, bedbound chronically ill appearing female  HEAD:  Atraumatic, Normocephalic  EYES: EOMI, PERRLA, conjunctiva and sclera clear, wearing corrective lenses  ENMT: Moist mucous membranes  NERVOUS SYSTEM:  Alert & Oriented X3, Motor Strength 4/5 B/L upper extremities, 2/5 lower extremities  CHEST/LUNG: Clear to auscultation bilaterally; No rales, rhonchi, wheezing, or rubs  HEART: Regular rate and rhythm; No murmurs, rubs, or gallops  ABDOMEN: Soft, Nontender, Nondistended; Bowel sounds present, no guarding, no rebound  EXTREMITIES:  +1 pitting b/l edema b/l, 2+ Peripheral Pulses, No clubbing, cyanosis, or edema    MEDICATIONS  (STANDING):  ALBUTerol/ipratropium for Nebulization 3 milliLiter(s) Nebulizer four times a day  amLODIPine   Tablet 5 milliGRAM(s) Oral daily  aspirin enteric coated 81 milliGRAM(s) Oral daily  atorvastatin 20 milliGRAM(s) Oral at bedtime  buDESOnide   0.5 milliGRAM(s) Respule 0.5 milliGRAM(s) Inhalation two times a day  dextrose 5%. 1000 milliLiter(s) (50 mL/Hr) IV Continuous <Continuous>  dextrose 50% Injectable 12.5 Gram(s) IV Push once  furosemide    Tablet 40 milliGRAM(s) Oral daily  insulin glargine Injectable (LANTUS) 30 Unit(s) SubCutaneous at bedtime  insulin lispro (HumaLOG) corrective regimen sliding scale   SubCutaneous three times a day before meals  levothyroxine 50 MICROGram(s) Oral daily  metoprolol 50 milliGRAM(s) Oral two times a day  pantoprazole    Tablet 40 milliGRAM(s) Oral before breakfast  predniSONE   Tablet 40 milliGRAM(s) Oral daily    MEDICATIONS  (PRN):  acetaminophen   Tablet 650 milliGRAM(s) Oral every 6 hours PRN For Temp greater than 38 C (100.4 F)  ALBUTerol/ipratropium for Nebulization 3 milliLiter(s) Nebulizer every 8 hours PRN Respiratory Distress  dextrose Gel 1 Dose(s) Oral once PRN Blood Glucose LESS THAN 70 milliGRAM(s)/deciliter  glucagon  Injectable 1 milliGRAM(s) IntraMuscular once PRN Glucose LESS THAN 70 milligrams/deciliter  ondansetron Injectable 4 milliGRAM(s) IV Push every 6 hours PRN Nausea    LABS:                        12.2   11.1  )-----------( 252      ( 09 Oct 2017 06:43 )             38.7     09 Oct 2017 06:43    139    |  98     |  41     ----------------------------<  205    4.6     |  35     |  1.30     Ca    9.4        09 Oct 2017 06:43    TPro  6.7    /  Alb  3.2    /  TBili  0.3    /  DBili  x      /  AST  14     /  ALT  21     /  AlkPhos  78     09 Oct 2017 06:43    LIVER FUNCTIONS - ( 09 Oct 2017 06:43 )  Alb: 3.2 g/dL / Pro: 6.7 g/dL / ALK PHOS: 78 U/L / ALT: 21 U/L / AST: 14 U/L / GGT: x           PT/INR - ( 09 Oct 2017 06:43 )   PT: 24.3 sec;   INR: 2.19 ratio           CAPILLARY BLOOD GLUCOSE  238 (09 Oct 2017 08:48)  318 (08 Oct 2017 21:38)  302 (08 Oct 2017 16:42)                                  DVT/GI ppx  Discussed with pt @ bedside

## 2017-10-09 NOTE — PROGRESS NOTE ADULT - SUBJECTIVE AND OBJECTIVE BOX
[facility]    Deann Kelly, Arthur, Nelly, Sha, Genaro, Esme      [phone]    CHIEF COMPLAINT: Patient is a 82y old  Female who presents with a chief complaint of worsening shortness of breath (05 Oct 2017 16:50)      Follow Up: sob, edema    Interim history: The patient reports no new symptoms.  Denies chest discomfort.  Improved shortness of breath.  No abdominal pain.  No new neurologic symptoms.      MEDICATIONS  (STANDING):  ALBUTerol/ipratropium for Nebulization 3 milliLiter(s) Nebulizer four times a day  amLODIPine   Tablet 5 milliGRAM(s) Oral daily  aspirin enteric coated 81 milliGRAM(s) Oral daily  atorvastatin 20 milliGRAM(s) Oral at bedtime  buDESOnide   0.5 milliGRAM(s) Respule 0.5 milliGRAM(s) Inhalation two times a day  dextrose 5%. 1000 milliLiter(s) (50 mL/Hr) IV Continuous <Continuous>  dextrose 50% Injectable 12.5 Gram(s) IV Push once  furosemide   Injectable 40 milliGRAM(s) IV Push daily  insulin glargine Injectable (LANTUS) 30 Unit(s) SubCutaneous at bedtime  insulin lispro (HumaLOG) corrective regimen sliding scale   SubCutaneous three times a day before meals  levothyroxine 50 MICROGram(s) Oral daily  metoprolol 50 milliGRAM(s) Oral two times a day  pantoprazole    Tablet 40 milliGRAM(s) Oral before breakfast  predniSONE   Tablet 40 milliGRAM(s) Oral daily    MEDICATIONS  (PRN):  acetaminophen   Tablet 650 milliGRAM(s) Oral every 6 hours PRN For Temp greater than 38 C (100.4 F)  ALBUTerol/ipratropium for Nebulization 3 milliLiter(s) Nebulizer every 8 hours PRN Respiratory Distress  dextrose Gel 1 Dose(s) Oral once PRN Blood Glucose LESS THAN 70 milliGRAM(s)/deciliter  glucagon  Injectable 1 milliGRAM(s) IntraMuscular once PRN Glucose LESS THAN 70 milligrams/deciliter  ondansetron Injectable 4 milliGRAM(s) IV Push every 6 hours PRN Nausea      REVIEW OF SYSTEMS:  eye, ent, GI, , allergic, dermatologic, musculoskeletal and neurologic are negative except as described above    Vital Signs Last 24 Hrs  T(C): 36.7 (09 Oct 2017 07:22), Max: 36.9 (08 Oct 2017 20:17)  T(F): 98 (09 Oct 2017 07:22), Max: 98.5 (08 Oct 2017 20:17)  HR: 60 (09 Oct 2017 07:22) (60 - 84)  BP: 124/70 (09 Oct 2017 07:22) (107/67 - 157/76)  BP(mean): --  RR: 17 (09 Oct 2017 07:22) (16 - 17)  SpO2: 99% (09 Oct 2017 07:22) (92% - 99%)    I&O's Summary      Telemetry past 24h: sr    PHYSICAL EXAM:    Constitutional: well-nourished, well-developed, NAD   HEENT:  MMM, sclerae anicteric, conjunctivae clear, no oral cyanosis.  Pulmonary: Non-labored, breath sounds are decr at bases, No wheezing, rales or rhonchi  Cardiovascular: Regular, S1 and S2.  No murmur.  No rubs, gallops or clicks  Gastrointestinal: Bowel Sounds present, soft, nontender.   Lymph: 1+ peripheral edema.   Neurological: Alert, no focal deficits  Skin: No rashes.  Psych:  Mood & affect appropriate    LABS: All Labs Reviewed:                        12.2   11.1  )-----------( 252      ( 09 Oct 2017 06:43 )             38.7                         11.8   10.5  )-----------( 252      ( 08 Oct 2017 06:34 )             37.0                         11.1   12.2  )-----------( 228      ( 07 Oct 2017 06:23 )             35.2     09 Oct 2017 06:43    139    |  98     |  41     ----------------------------<  205    4.6     |  35     |  1.30   08 Oct 2017 06:34    139    |  101    |  35     ----------------------------<  182    3.9     |  31     |  1.10   07 Oct 2017 06:23    141    |  104    |  30     ----------------------------<  213    4.9     |  29     |  1.30     Ca    9.4        09 Oct 2017 06:43  Ca    8.7        08 Oct 2017 06:34  Ca    8.7        07 Oct 2017 06:23    TPro  6.7    /  Alb  3.2    /  TBili  0.3    /  DBili  x      /  AST  14     /  ALT  21     /  AlkPhos  78     09 Oct 2017 06:43  TPro  6.4    /  Alb  2.9    /  TBili  0.3    /  DBili  x      /  AST  9      /  ALT  13     /  AlkPhos  69     08 Oct 2017 06:34    PT/INR - ( 09 Oct 2017 06:43 )   PT: 24.3 sec;   INR: 2.19 ratio               Blood Culture: Organism --  Gram Stain Blood -- Gram Stain --  Specimen Source .Blood Blood-Venous  Culture-Blood --    Organism --  Gram Stain Blood -- Gram Stain --  Specimen Source .Urine Clean Catch (Midstream)  Culture-Blood --            RADIOLOGY:    EKG:    Echo:    < from: TTE Echo Doppler w/o Cont (10.06.17 @ 11:39) >     EXAM:  ECHO TTE W/O CON COMP W/DOPPLR         PROCEDURE DATE:  10/06/2017        INTERPRETATION:  Ordering Physician: MARANDA ELDER [id number]    Indication: Dyspnea    Study Quality: Technically difficult   A complete echocardiographic study was performed utilizing standard   protocol including spectral and color Doppler in all echocardiographic   windows.    Height:  149 cm    Weight: 76-kg  BSA: 1.7  Blood Pressure: 138/68    MEASUREMENTS  IVS: 1.4cm  PWT: 1.1cm  LA: 4.3cm  AO: 3.4cm  LVIDd: 4.2cm  LVIDs: 2.8cm    LVEF: 55-60%  RVSP: 52 mmHg    FINDINGS  Left Ventricle: Mild left ventricular concentric hypertrophy. Grossly   normal left ventricular cavity size and systolic function. No segmental   wall motion abnormalities  Aortic Valve: Calcified aortic valve with decreased opening  Mitral Valve: Mitral annular calcification. In some views, there appears   to be decreased mobility of the posterior mitral valve leaflet. Mild   mitral regurgitation  Tricuspid Valve: Not well-visualized. Mild to moderate tricuspid   regurgitation  Pulmonic Valve: Not well visualized. Mild pulmonic regurgitation  Left Atrium: Left atrial enlargement  Right Ventricle: Not well visualized. Grossly normal right ventricular   size and function  Right Atrium: Grossly normal  Diastolic Function: There is evidence of diastolic dysfunction  Pericardium/Pleura: Predominant anterior fat pad. No pericardial effusion  Hemodynamics: The pulmonary artery systolic pressure is estimated to be   52 mmHg, assuming the right atrial pressure is 5 mmHg, consistent with   elevated pulmonary pressures    CONCLUSIONS:  1. Technically difficult study  2. Mild left ventricular concentric hypertrophy  3. Left atrial enlargement  4. Grossly preserved left ventricular systolic function  5. Mitral annular calcification. In some views, the posterior mitral   valve leaflet appears calcified with decreased mobility. Mild mitral   regurgitation. No gradient suggestive of mitral stenosis.  6. Grossly normal right ventricular size and function  7. The pulmonary artery systolic pressure is estimated to be 52 mmHg,   assuming the right atrial pressure is 5 mmHg, consistent with mild to   moderate elevation of pulmonary pressures  8.Calcified aortic valve with decreased opening  9. No pericardial effusion    No prior exam for comparison                      STACI MATIAS   This document has been electronically signed. Oct  6 2017  2:28PM                < end of copied text >

## 2017-10-09 NOTE — ADVANCED PRACTICE NURSE CONSULT - ASSESSMENT
Vital Signs Last 24 Hrs  T(C): 36.7 (09 Oct 2017 15:38), Max: 36.9 (08 Oct 2017 20:17)  T(F): 98.1 (09 Oct 2017 15:38), Max: 98.5 (08 Oct 2017 20:17)  HR: 81 (09 Oct 2017 15:40) (60 - 963)  BP: 142/68 (09 Oct 2017 15:38) (107/67 - 157/76)  BP(mean): --  RR: 19 (09 Oct 2017 15:38) (16 - 20)  SpO2: 92% (09 Oct 2017 15:40) (91% - 99%)    Hemoglobin A1C, Whole Blood: 7.1 % (10-05-17 @ 08:11)   eGFR if Non African American: 38 mL/min/1.73M2 (10-09-17 @ 06:43)  eGFR if African American: 45 mL/min/1.73M2 (10-09-17 @ 06:43)  eGFR if Non African American: 47 mL/min/1.73M2 (10-08-17 @ 06:34)  eGFR if African American: 55 mL/min/1.73M2 (10-08-17 @ 06:34)  eGFR if Non African American: 38 mL/min/1.73M2 (10-07-17 @ 06:23)  eGFR if African American: 45 mL/min/1.73M2 (10-07-17 @ 06:23)      CAPILLARY BLOOD GLUCOSE  446 (09 Oct 2017 17:25)  170 (09 Oct 2017 11:37)  238 (09 Oct 2017 08:48)  318 (08 Oct 2017 21:38)          DIET: cc

## 2017-10-09 NOTE — PROGRESS NOTE ADULT - PROBLEM SELECTOR PROBLEM 8
DVT (deep venous thrombosis)
Prophylactic measure

## 2017-10-09 NOTE — PROGRESS NOTE ADULT - PROBLEM SELECTOR PLAN 1
Currently on 4L NC, saturating well. Patient uses 3.4 L NC at home  -Duoneb q8 prn  -Solum medrol 40 Q8  -f/u pulm recs
Currently on 4L NC, saturating well. Patient uses 3.4 L NC at home  -Duoneb q8 prn  will taper predisone 40 mg daily,   -f/u pulm recs  if continue to tolerate titration will start d/c planning
Currently on 4L NC, saturating well. Patient uses 3.4 L NC at home  -Duoneb q8 prn  -Solum medrol 40 daily titrating, tolerating well   -f/u pulm recs  if continue to tolerate titration will start d/c planning
Currently on 4L NC, saturating well. Patient uses 3.4 L NC at home  -Duoneb q8 prn  -will taper prednisone 40mg to 30 mg today   -f/u pulm recs  - if continue to tolerate titration will start d/c planning
Currently on BiPAP, saturating well. Patient uses 3.4 L NC at home  -Duoneb q8 prn  -Solum medrol 40 Q8  -f/u pulm recs

## 2017-10-09 NOTE — PROGRESS NOTE ADULT - PROBLEM SELECTOR PLAN 7
H/o DVT x2. Today INR of 2.19. Will give 1 mg today.   - Home regimen is 1mg on M, T, W and 2 mg on Th, F, Sat, Sun.

## 2017-10-09 NOTE — PROGRESS NOTE ADULT - PROBLEM SELECTOR PLAN 2
Currently on Bipap  -Duonebs q8  -Solum medrol 40 Q8  -Pulm consult (janelle)
Currently on Bipap  -Duonebs q8  -Solum medrol 40daily, duoneb/spirivia   -Pulm consult (janelle)
Currently on Bipap  -Duonebs q8  -Solum medrol 40 Q8  -Pulm consult (janelle)
Currently on Bipap  -Duonebs q8  -Solum medrol 40daily, duoneb/spirivia   -Pulm consult (janelle)
Currently on Bipap  -Duonebs q8  -Solum medrol 40daily, duoneb/spirivia   -Pulm consult (janelle)

## 2017-10-09 NOTE — PROGRESS NOTE ADULT - ATTENDING COMMENTS
81 y.o. 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 Admitted for acute respiratory distress, acute COPD exacerbation and probable exacerbation acute on chronic diastolic CHF last echo last year july with EF 60%, has been feeling better, continue on taper predisone, received lasix with good urineoutpt, currently is euvolemic, pt read for d/c as per patient lives with son and cant pick her up until tommorow

## 2017-10-09 NOTE — PROGRESS NOTE ADULT - PROBLEM SELECTOR PLAN 9
GI ppx: Currently on protonix for GERD at home , continue with regimen
resolved  - f/u bmp

## 2017-10-09 NOTE — PROGRESS NOTE ADULT - SUBJECTIVE AND OBJECTIVE BOX
Patient seen and examined today Plan discussed/Questions answered  (with patient/ancillary staff/colleagues) Chart notes reviewd More detailed Pulm/Critical Care notes, assessment and plan  will be added later today as needed after reviewing further labs as they become available     Notable interval events reviewed    ROS/PE  . REVIEW OF SYMPTOMS    Able to give ROS  Yes     RELIABLE YES   Weakness Yes   Chills No   Vision changes No  Lymph nodes No enlarged glands   Endocrine No unexplained hair loss No het or cold intolerance   Allergy No hives   Sore throat No   Coughing blood No  Headache No  Confusion No  Chest pain No  Palpitations No   Pain abdomen NO   Shortness of breath YES  Vomiting NO  Pain neck No  Pain abdomen NO      PHYSICAL EXAM    HEENT Unremarkable PERRLA atraumatic  RESP Fair air entry EXP prolonged    Harsh breath sound Resp distres mild  CARDIAC S1 S2 No S3     NO JVD   ABDOMEN SOFT BS PRESENT NOT DISTENDED No hepatosplenomegaly  PEDAL EDEMA present No calf tenderness  NO rash GENERAL Not TOXIC looking  Awake A & O x 3   . Patient seen and examined today Plan discussed/Questions answered  (with patient/ancillary staff/colleagues) Chart notes reviewd More detailed Pulm/Critical Care notes, assessment and plan  will be added later today as needed after reviewing further labs as they become available     Notable interval events reviewed    ROS/PE  . REVIEW OF SYMPTOMS    Able to give ROS  Yes     RELIABLE YES   Weakness Yes   Chills No   Vision changes No  Lymph nodes No enlarged glands   Endocrine No unexplained hair loss No het or cold intolerance   Allergy No hives   Sore throat No   Coughing blood No  Headache No  Confusion No  Chest pain No  Palpitations No   Pain abdomen NO   Shortness of breath YES  Vomiting NO  Pain neck No  Pain abdomen NO      PHYSICAL EXAM    HEENT Unremarkable PERRLA atraumatic  RESP Fair air entry EXP prolonged    Harsh breath sound Resp distres mild  CARDIAC S1 S2 No S3     NO JVD   ABDOMEN SOFT BS PRESENT NOT DISTENDED No hepatosplenomegaly  PEDAL EDEMA present No calf tenderness  NO rash GENERAL Not TOXIC looking  Awake A & O x 3   . VITALS/LABS  10/9/2017 afeb 66 130/70 18 92%   10/9/2017 W 11.1 Hb 12.2 Plt 252  Na 139 K 4.6 CO2 35 Cr 1.3   PROBLEM ASSESSMENT PLAN  ACUTE RESP FAILURE   10/6 VBG pH 746 10/7/2017 4l 96%   On BPAP 10/5/2017 10/5/.4  10/4/2017 9p 10/5/.4 748/33/101  HO RECURRENT VTE   On Coumadin  9/26/2016 CTA Ch  1) NO PE   10/1/2016 V duplex legs No dvt   RO RESP TRACT INFECTION  10/4 pct n 10/4/2017 RVP n 10/5 bc n 10/5 uc n  Abio held off   COPD   Duoneb.3p (10/5)   Pred 40 (10/8)   RO MI  10/4-10/5-10/6 ck 55 Tr 1 .192 (i) - .162 (i)-.119 (i)   atorvastat 20 (10/4) metorpolol 50.2 (10/5)   HYPERTENSION  Norvasc 5 (10/5)   RO CHF   Laxix 40 (10/6)   10/6/2017 ECHO EF 55% RVSP 52 PASP 52   7/24/2016 ECHO EF 60% Mod MR    COAGULOPATHY SEC COUMADIN   10/4 INR 4.1   DIABETES   Lantus 30 (10/5) sl scale (10/5)   GLOBAL ISSUE/BEST PRACTICE:        PROBLEM:      Analgesia:     na                        PROBLEM: Sedation:     na               PROBLEM: HOB elevation:   y             PROBLEM: Stress ulcer proph:    protonix 40 910/5)                       PROBLEM: VTE prophylaxis:      On coumadin poa (10/5)                   PROBLEM: Glycemic control:    On insulin (10/5)    PROBLEM: Nutrition:    cons carb renal (10/5)           PROBLEM: Advanced directive: na     PROBLEM: Allergies:  na  PATIENT DESCRIPTION CC HPI PMH SOCIAL   81 y.o. 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   HOME MEDS Duoneb norvasc 5 asa 81lipitor 20 pulmicort lasix 20 sl scale Lantus 35 levoxyl 50 metoprolol 50.2 protonix 40 warfarin 1   HOSPITAL COURSE 10/4/2017 ADMISSION [facility] P   81 f HO recurrent DVT on coumadin HO HTN, HLD, IDDM HO poss Takosubo cmpthy 9/2016 was on vasopressors and inotropes 9/2016 hosp admitted 10/4/2017 with SOB   AC RESP FAILURE (HYPOXEMIC) Managed with NIV Weaned  RESP TRACT BACTERIAL INFECTION Excluded Neg pct Neg RVP   DYSPNEA Likely sec COPD Ex v ADHF although CXR showed lung fields to be clear VTE unlikely as INR was 4 on arrival  COPD EX Managed with BD steroids   TIME SPENT Over 25 minutes aggregate care time spent on encounter; activities included   direct patient care, counseling and/or coordinating care reviewing notes, lab data/ imaging , discussion with multidisciplinary team/ patient  /family. Risks, benefits, alternatives  discussed in detail. Questions/concerns  were addressed  to the best of my ability .

## 2017-10-09 NOTE — PROGRESS NOTE ADULT - PROBLEM SELECTOR PROBLEM 4
Elevated troponin
Insulin dependent diabetes mellitus

## 2017-10-09 NOTE — ADVANCED PRACTICE NURSE CONSULT - REASON FOR CONSULT
82y    Female    Patient is a 82y old  Female who presents with a chief complaint of worsening shortness of breath (05 Oct 2017 16:50)  AC dinner 434/446 repeated. low corrective scale and steroid therapy      HPI:  This is an 81 y.o. 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 She states it started one week prior when she had non bloody diarrhea for two days. THe dyspnea has progressively getting worse since then. Patient uses 3 L NC O2 at home. She tried using Duoneb x3 at home but it did not help, nor did the 3L of O2. Patient denies any fever, chills,  n/v, palpitations, diaphoresis. She also admits to increased swelling of both her feet and increased lethargy today.    In the ED, vitals were Temp: 99.8F   /124 RR 24  SpO2: 86% RA. Sig labs include WBC 7.8, INR 4.10, PTT 40.1, PT 46, lactate 3.6, glucose 111, albumin 3.1,procalc <0.05, trop 0.192, pro BNP 1269. ABG showed pH 7.48, pCO2 33, HCO3 26. UA showed trace leuk est, neg nitrate. CXR ordered. EKG ordered. Patient put on BiPap. Gave solu Medrol 125 mg IV one dose, metoprolol 5 mg IV, 1 L IVF NS bolus, Duonebs x1. (04 Oct 2017 23:52)      PAST MEDICAL & SURGICAL HISTORY:  Insulin dependent diabetes mellitus  HLD (hyperlipidemia)  HTN (hypertension)  COPD (chronic obstructive pulmonary disease)  Emphysema  DVT (deep venous thrombosis)  No significant past surgical history      MEDICATIONS  (STANDING):  ALBUTerol/ipratropium for Nebulization 3 milliLiter(s) Nebulizer four times a day  amLODIPine   Tablet 5 milliGRAM(s) Oral daily  aspirin enteric coated 81 milliGRAM(s) Oral daily  atorvastatin 20 milliGRAM(s) Oral at bedtime  buDESOnide   0.5 milliGRAM(s) Respule 0.5 milliGRAM(s) Inhalation two times a day  dextrose 5%. 1000 milliLiter(s) (50 mL/Hr) IV Continuous <Continuous>  dextrose 50% Injectable 12.5 Gram(s) IV Push once  furosemide    Tablet 40 milliGRAM(s) Oral daily  insulin glargine Injectable (LANTUS) 30 Unit(s) SubCutaneous at bedtime  insulin lispro (HumaLOG) corrective regimen sliding scale   SubCutaneous three times a day before meals  insulin lispro (HumaLOG) corrective regimen sliding scale   SubCutaneous at bedtime  insulin lispro Injectable (HumaLOG) 4 Unit(s) SubCutaneous once  levothyroxine 50 MICROGram(s) Oral daily  metoprolol 50 milliGRAM(s) Oral two times a day  pantoprazole    Tablet 40 milliGRAM(s) Oral before breakfast  predniSONE   Tablet 30 milliGRAM(s) Oral daily  warfarin 1 milliGRAM(s) Oral once

## 2017-10-09 NOTE — PROGRESS NOTE ADULT - PROBLEM SELECTOR PLAN 3
-ECHO:  Mild left ventricular concentric hypertrophy. Left atrial enlargement. EF 55-60%   -Aortic aneurysm 5.6 cm noted on prior imaging. Patient is not a candidate for EVAR at this time.   -Cardio recs- changed lasix to 40 mg PO, continue on ASA 81, likely subclinical CAD

## 2017-10-09 NOTE — PROGRESS NOTE ADULT - ASSESSMENT
82 yo F PMH COPD (60 pack years, quit 3 years ago), recurrent DVT on coumadin, HTN, HLD, IDDM, known large AAA not considered a candidate for repair, presented to the ED with SOB, presumably in the setting of volume overload with COPD and CHF exacerbation.      - Shortness of breath seems to have improved with COPD and CHF treatment  - No clear evidence of acute ischemia  - Troponins elevated likely from demand ischemia and not atherothrombosis  - Continue ASA 81mg in setting of likely subclinical CAD, based on her known PVD and prior EKG 2016 while vented with PNA showing SR, ST depressions  -echo reveals that LV function is preserved. Her mitral valve is calcified but only mild MR. There is a moderate elevation in her pulmonary pressures, which is presumably present in the setting of volume overload.  - Presentation likely in part from decompensated HF, noting edema and elevated bnp  -can change to lasix 40mg po daily  - BP well controlled, continue home BP meds of Norvasc and Metoprolol  - known AAA, not considered a candidate for any sort of repair  - monitor and replete lytes, keep K>4, Mg>2  - Other cardiovascular workup will depend on clinical course.  - All other workup per primary team  - Will follow

## 2017-10-09 NOTE — ADVANCED PRACTICE NURSE CONSULT - RECOMMEDATIONS
T2 DM with a1c 7.7%  Case discussed with Dr. Perez  Corrective scales changed to moderate AC and moderate HS  Additional 4 units added to low corrective scale to cover the 's

## 2017-10-09 NOTE — PROGRESS NOTE ADULT - PROBLEM SELECTOR PROBLEM 7
HLD (hyperlipidemia)
HLD (hyperlipidemia)
DVT (deep venous thrombosis)
HLD (hyperlipidemia)
HLD (hyperlipidemia)

## 2017-10-09 NOTE — PROGRESS NOTE ADULT - PROBLEM SELECTOR PROBLEM 5
Insulin dependent diabetes mellitus
Insulin dependent diabetes mellitus
HTN (hypertension)
Insulin dependent diabetes mellitus
Insulin dependent diabetes mellitus

## 2017-10-09 NOTE — PROGRESS NOTE ADULT - PROBLEM SELECTOR PROBLEM 3
Congestive heart failure, unspecified congestive heart failure chronicity, unspecified congestive heart failure type

## 2017-10-10 VITALS
TEMPERATURE: 98 F | DIASTOLIC BLOOD PRESSURE: 56 MMHG | HEART RATE: 110 BPM | RESPIRATION RATE: 18 BRPM | OXYGEN SATURATION: 96 % | SYSTOLIC BLOOD PRESSURE: 139 MMHG

## 2017-10-10 LAB
ANION GAP SERPL CALC-SCNC: 9 MMOL/L — SIGNIFICANT CHANGE UP (ref 5–17)
APTT BLD: 26.1 SEC — LOW (ref 27.5–37.4)
BUN SERPL-MCNC: 42 MG/DL — HIGH (ref 7–23)
CALCIUM SERPL-MCNC: 8.8 MG/DL — SIGNIFICANT CHANGE UP (ref 8.5–10.1)
CHLORIDE SERPL-SCNC: 97 MMOL/L — SIGNIFICANT CHANGE UP (ref 96–108)
CO2 SERPL-SCNC: 32 MMOL/L — HIGH (ref 22–31)
CREAT SERPL-MCNC: 1.2 MG/DL — SIGNIFICANT CHANGE UP (ref 0.5–1.3)
CULTURE RESULTS: SIGNIFICANT CHANGE UP
CULTURE RESULTS: SIGNIFICANT CHANGE UP
GLUCOSE SERPL-MCNC: 197 MG/DL — HIGH (ref 70–99)
HCT VFR BLD CALC: 38.5 % — SIGNIFICANT CHANGE UP (ref 34.5–45)
HGB BLD-MCNC: 12.4 G/DL — SIGNIFICANT CHANGE UP (ref 11.5–15.5)
INR BLD: 1.88 RATIO — HIGH (ref 0.88–1.16)
MCHC RBC-ENTMCNC: 29 PG — SIGNIFICANT CHANGE UP (ref 27–34)
MCHC RBC-ENTMCNC: 32.2 GM/DL — SIGNIFICANT CHANGE UP (ref 32–36)
MCV RBC AUTO: 89.9 FL — SIGNIFICANT CHANGE UP (ref 80–100)
PLATELET # BLD AUTO: 245 K/UL — SIGNIFICANT CHANGE UP (ref 150–400)
POTASSIUM SERPL-MCNC: 3.9 MMOL/L — SIGNIFICANT CHANGE UP (ref 3.5–5.3)
POTASSIUM SERPL-SCNC: 3.9 MMOL/L — SIGNIFICANT CHANGE UP (ref 3.5–5.3)
PROTHROM AB SERPL-ACNC: 20.8 SEC — HIGH (ref 9.8–12.7)
RBC # BLD: 4.29 M/UL — SIGNIFICANT CHANGE UP (ref 3.8–5.2)
RBC # FLD: 14.4 % — SIGNIFICANT CHANGE UP (ref 10.3–14.5)
SODIUM SERPL-SCNC: 138 MMOL/L — SIGNIFICANT CHANGE UP (ref 135–145)
SPECIMEN SOURCE: SIGNIFICANT CHANGE UP
SPECIMEN SOURCE: SIGNIFICANT CHANGE UP
WBC # BLD: 12.5 K/UL — HIGH (ref 3.8–10.5)
WBC # FLD AUTO: 12.5 K/UL — HIGH (ref 3.8–10.5)

## 2017-10-10 PROCEDURE — 99239 HOSP IP/OBS DSCHRG MGMT >30: CPT

## 2017-10-10 PROCEDURE — 99233 SBSQ HOSP IP/OBS HIGH 50: CPT

## 2017-10-10 RX ORDER — FUROSEMIDE 40 MG
1 TABLET ORAL
Qty: 30 | Refills: 0 | OUTPATIENT
Start: 2017-10-10 | End: 2017-11-09

## 2017-10-10 RX ORDER — FUROSEMIDE 40 MG
1 TABLET ORAL
Qty: 0 | Refills: 0 | COMMUNITY

## 2017-10-10 RX ORDER — WARFARIN SODIUM 2.5 MG/1
1 TABLET ORAL ONCE
Qty: 0 | Refills: 0 | Status: DISCONTINUED | OUTPATIENT
Start: 2017-10-10 | End: 2017-10-10

## 2017-10-10 RX ADMIN — PANTOPRAZOLE SODIUM 40 MILLIGRAM(S): 20 TABLET, DELAYED RELEASE ORAL at 06:34

## 2017-10-10 RX ADMIN — Medication 50 MILLIGRAM(S): at 06:34

## 2017-10-10 RX ADMIN — Medication 0.5 MILLIGRAM(S): at 07:49

## 2017-10-10 RX ADMIN — Medication 50 MICROGRAM(S): at 06:34

## 2017-10-10 RX ADMIN — Medication 2: at 08:13

## 2017-10-10 RX ADMIN — Medication 3 MILLILITER(S): at 07:48

## 2017-10-10 RX ADMIN — Medication 8: at 12:23

## 2017-10-10 RX ADMIN — AMLODIPINE BESYLATE 5 MILLIGRAM(S): 2.5 TABLET ORAL at 06:34

## 2017-10-10 RX ADMIN — Medication 81 MILLIGRAM(S): at 12:23

## 2017-10-10 RX ADMIN — Medication 40 MILLIGRAM(S): at 06:34

## 2017-10-10 RX ADMIN — Medication 30 MILLIGRAM(S): at 06:34

## 2017-10-10 RX ADMIN — Medication 3 MILLILITER(S): at 11:14

## 2017-10-10 NOTE — PROGRESS NOTE ADULT - SUBJECTIVE AND OBJECTIVE BOX
Chart reviewed: Assessment plan is as below Note will be updated as more  patient data is gathered Chart reviewed: Assessment plan is as below Note will be updated as more  patient data is gathered   Patient seen and examined today Plan discussed/Questions answered  (with patient/ancillary staff/colleagues) Chart notes reviewd More detailed Pulm/Critical Care notes, assessment and plan  will be added later today as needed after reviewing further labs as they become available     Notable interval events reviewed    ROS/PE  . REVIEW OF SYMPTOMS    Able to give ROS  Yes     RELIABLE No   Weakness Yes   Chills No   Vision changes No  Lymph nodes No enlarged glands   Endocrine No unexplained hair loss No het or cold intolerance   Allergy No hives   Sore throat No   Coughing blood No  Headache No  Confusion YES  Chest pain No  Palpitations No   Pain abdomen NO   Shortness of breath YES  Vomiting NO  Pain neck No  Pain abdomen NO   PHYSICAL EXAM    HEENT Unremarkable PERRLA atraumatic  RESP Fair air entry EXP prolonged    Harsh breath sound Resp distres mild  CARDIAC S1 S2 No S3     NO JVD   ABDOMEN SOFT BS PRESENT NOT DISTENDED No hepatosplenomegaly  PEDAL EDEMA present No calf tenderness  NO rash GENERAL Not TOXIC looking  Awake A & O x 3   . VITALS/LABS  10/10/2017 afeb 110 130/56 18 96%   10/10/2017 acc 188-127   10/10/2017 W 12.5 Hb 12.4 Plt 245  Na 138 K 3.9 CO2 32B Cr 1.2 G 197   PROBLEM ASSESSMENT PLAN  ACUTE RESP FAILURE   10/6 VBG pH 746 10/7/2017 4l 96%   On BPAP 10/5/2017 10/5/.4  10/4/2017 9p 10/5/.4 748/33/101  HO RECURRENT VTE   On Coumadin  9/26/2016 CTA Ch  1) NO PE   10/1/2016 V duplex legs No dvt   RO RESP TRACT INFECTION  10/4 pct n 10/4/2017 RVP n 10/5 bc n 10/5 uc n  Abio held off   COPD   Duoneb.3p (10/5)   Pred 40 (10/8)   RO MI  10/4-10/5-10/6 ck 55 Tr 1 .192 (i) - .162 (i)-.119 (i)   atorvastat 20 (10/4) metorpolol 50.2 (10/5)   HYPERTENSION  Norvasc 5 (10/5)   RO CHF   Laxix 40 (10/6)   10/6/2017 ECHO EF 55% RVSP 52 PASP 52   7/24/2016 ECHO EF 60% Mod MR    COAGULOPATHY SEC COUMADIN   10/4 INR 4.1   DIABETES   Lantus 30 (10/5) sl scale (10/5)   GLOBAL ISSUE/BEST PRACTICE:        PROBLEM:      Analgesia:     na                        PROBLEM: Sedation:     na               PROBLEM: HOB elevation:   y             PROBLEM: Stress ulcer proph:    protonix 40 910/5)                       PROBLEM: VTE prophylaxis:      On coumadin poa (10/5)                   PROBLEM: Glycemic control:    On insulin (10/5)    PROBLEM: Nutrition:    cons carb renal (10/5)           PROBLEM: Advanced directive: na     PROBLEM: Allergies:  na  PATIENT DESCRIPTION CC HPI PMH SOCIAL   81 y.o. 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   HOME MEDS Duoneb norvasc 5 asa 81lipitor 20 pulmicort lasix 20 sl scale Lantus 35 levoxyl 50 metoprolol 50.2 protonix 40 warfarin 1   HOSPITAL COURSE 10/4/2017 ADMISSION [facility] P   81 f HO recurrent DVT on coumadin HO HTN, HLD, IDDM HO poss Takosubo cmpthy 9/2016 was on vasopressors and inotropes 9/2016 hosp admitted 10/4/2017 with SOB   AC RESP FAILURE (HYPOXEMIC) Managed with NIV Weaned  RESP TRACT BACTERIAL INFECTION Excluded Neg pct Neg RVP   DYSPNEA Likely sec COPD Ex v ADHF although CXR showed lung fields to be clear VTE unlikely as INR was 4 on arrival  COPD EX Managed with BD steroids   TIME SPENT Over 25 minutes aggregate care time spent on encounter; activities included   direct patient care, counseling and/or coordinating care reviewing notes, lab data/ imaging , discussion with multidisciplinary team/ patient  /family. Risks, benefits, alternatives  discussed in detail. Questions/concerns  were addressed  to the best of my ability .

## 2017-10-10 NOTE — PROGRESS NOTE ADULT - ASSESSMENT
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  Shortness of breath improved with COPD and CHF treatment. No clear evidence of acute ischemia or volume overload    Recommend:  - Consider D/C home today  - cont furosemide  - Troponins elevated likely from demand ischemia and not atherothrombosis  - Continue ASA 81mg in setting of likely subclinical CAD, based on her known PVD and prior EKG 2016 while vented with PNA showing SR, ST depressions  -echo reveals that LV function is preserved. Her mitral valve is calcified but only mild MR. There is a moderate elevation in her pulmonary pressures, which is presumably present in the setting of volume overload.  - BP well controlled, continue home BP meds of Norvasc and Metoprolol  - known AAA, not considered a candidate for any sort of repair  - monitor and replete lytes, keep K>4, Mg>2  - Other cardiovascular workup will depend on clinical course.  - All other workup per primary team  - Will follow

## 2017-10-10 NOTE — PROGRESS NOTE ADULT - SUBJECTIVE AND OBJECTIVE BOX
Follow up: SOB, edema    HPI:  This is an 81 y.o. 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 She states it started one week prior when she had non bloody diarrhea for two days. THe dyspnea has progressively getting worse since then. Patient uses 3 L NC O2 at home. She tried using Duoneb x3 at home but it did not help, nor did the 3L of O2.    She is now much improved after treatment for volume overload and COPD. Reports that she is back to baseline and has no chest pain or dyspnea.    PAST MEDICAL & SURGICAL HISTORY:  Insulin dependent diabetes mellitus  HLD (hyperlipidemia)  HTN (hypertension)  COPD (chronic obstructive pulmonary disease)  Emphysema  DVT (deep venous thrombosis)  No significant past surgical history      MEDICATIONS  (STANDING):  ALBUTerol/ipratropium for Nebulization 3 milliLiter(s) Nebulizer four times a day  amLODIPine   Tablet 5 milliGRAM(s) Oral daily  aspirin enteric coated 81 milliGRAM(s) Oral daily  atorvastatin 20 milliGRAM(s) Oral at bedtime  buDESOnide   0.5 milliGRAM(s) Respule 0.5 milliGRAM(s) Inhalation two times a day  dextrose 5%. 1000 milliLiter(s) (50 mL/Hr) IV Continuous <Continuous>  dextrose 50% Injectable 12.5 Gram(s) IV Push once  furosemide    Tablet 40 milliGRAM(s) Oral daily  insulin glargine Injectable (LANTUS) 30 Unit(s) SubCutaneous at bedtime  insulin lispro (HumaLOG) corrective regimen sliding scale   SubCutaneous three times a day before meals  insulin lispro (HumaLOG) corrective regimen sliding scale   SubCutaneous at bedtime  levothyroxine 50 MICROGram(s) Oral daily  metoprolol 50 milliGRAM(s) Oral two times a day  pantoprazole    Tablet 40 milliGRAM(s) Oral before breakfast  predniSONE   Tablet 30 milliGRAM(s) Oral daily    MEDICATIONS  (PRN):  acetaminophen   Tablet 650 milliGRAM(s) Oral every 6 hours PRN For Temp greater than 38 C (100.4 F)  ALBUTerol/ipratropium for Nebulization 3 milliLiter(s) Nebulizer every 8 hours PRN Respiratory Distress  dextrose Gel 1 Dose(s) Oral once PRN Blood Glucose LESS THAN 70 milliGRAM(s)/deciliter  glucagon  Injectable 1 milliGRAM(s) IntraMuscular once PRN Glucose LESS THAN 70 milligrams/deciliter  ondansetron Injectable 4 milliGRAM(s) IV Push every 6 hours PRN Nausea      Vital Signs Last 24 Hrs  T(C): 36.6 (10 Oct 2017 04:56), Max: 36.8 (09 Oct 2017 20:18)  T(F): 97.9 (10 Oct 2017 04:56), Max: 98.2 (09 Oct 2017 20:18)  HR: 59 (10 Oct 2017 07:40) (59 - 81)  BP: 160/82 (10 Oct 2017 04:56) (127/72 - 160/82)  BP(mean): --  RR: 17 (10 Oct 2017 04:56) (17 - 20)  SpO2: 98% (10 Oct 2017 07:40) (91% - 98%)    I&O's Summary    09 Oct 2017 07:01  -  10 Oct 2017 07:00  --------------------------------------------------------  IN: 240 mL / OUT: 200 mL / NET: 40 mL        PHYSICAL EXAM:    Constitutional: NAD, awake and alert, well-developed  Eyes:  EOMI,  Pupils round, no lesions  ENMT: no exudate or erythema  Pulmonary: Non-labored, breath sounds are clear bilaterally, No wheezing, rales or rhonchi  Cardiovascular: PMI not palpable RRR normal S1 and S2, no murmurs, rubs, gallops or clicks  Gastrointestinal: Bowel Sounds present, soft, nontender.   Lymph: No cervical lymphadenopathy.  Neurological: Alert, no focal deficits  Skin: No rashes.  No cyanosis.  Psych:  Mood & affect appropriate   Ext: trc lower ext edema                                12.4   12.5  )-----------( 245      ( 10 Oct 2017 07:36 )             38.5     10-10    138  |  97  |  42<H>  ----------------------------<  197<H>  3.9   |  32<H>  |  1.20    Ca    8.8      10 Oct 2017 07:36    TPro  6.7  /  Alb  3.2<L>  /  TBili  0.3  /  DBili  x   /  AST  14<L>  /  ALT  21  /  AlkPhos  78  10-09    < from: 12 Lead ECG (10.04.17 @ 23:41) >    Ventricular Rate 81 BPM    Atrial Rate 81 BPM    P-R Interval 168 ms    QRS Duration 96 ms    Q-T Interval 398 ms    QTC Calculation(Bezet) 462 ms    P Axis 65 degrees    R Axis -53 degrees    T Axis 18 degrees    Diagnosis Line Normal sinus rhythm  Left anterior fascicular block  Nonspecific ST and T wave abnormality  Abnormal ECG  When compared with ECG of 04-OCT-2017 20:23, (Unconfirmed)  Vent. rate has decreased BY  43 BPM  Nonspecific T wave abnormality now evident in Anterior leads  Confirmed by Dario Villegas MD ([id number]) on 10/5/2017 12:35:59 PM    < end of copied text >  < from: Xray Chest 1 View AP- PORTABLE-Urgent (10.04.17 @ 21:12) >    EXAM:  PORTABLE CHEST URGENT                            PROCEDURE DATE:  10/04/2017          INTERPRETATION:  AP semierect chest on October 4 at 9:05 PM. Patient is   short of breath.    Poor inspiration crowds the chest.    The heart is magnified by technique.    Mild left upper lumbar curve again noted.    October 10, 2016 there were slight basilar effusions. These are no longer   evident. The lungs are presently clear.    IMPRESSION: Clear lungs at this time.                STACI SEWELL M.D.,ATTENDING RADIOLOGIST  This document has been electronically signed. Oct  5 2017  8:29AM                < end of copied text >  < from: TTE Echo Doppler w/o Cont (10.06.17 @ 11:39) >     EXAM:  ECHO TTE W/O CON COMP W/DOPPLR         PROCEDURE DATE:  10/06/2017        INTERPRETATION:  Ordering Physician: MARANDA ELDER [id number]    Indication: Dyspnea    Study Quality: Technically difficult   A complete echocardiographic study was performed utilizing standard   protocol including spectral and color Doppler in all echocardiographic   windows.    Height:  149 cm    Weight: 76-kg  BSA: 1.7  Blood Pressure: 138/68    MEASUREMENTS  IVS: 1.4cm  PWT: 1.1cm  LA: 4.3cm  AO: 3.4cm  LVIDd: 4.2cm  LVIDs: 2.8cm    LVEF: 55-60%  RVSP: 52 mmHg    FINDINGS  Left Ventricle: Mild left ventricular concentric hypertrophy. Grossly   normal left ventricular cavity size and systolic function. No segmental   wall motion abnormalities  Aortic Valve: Calcified aortic valve with decreased opening  Mitral Valve: Mitral annular calcification. In some views, there appears   to be decreased mobility of the posterior mitral valve leaflet. Mild   mitral regurgitation  Tricuspid Valve: Not well-visualized. Mild to moderate tricuspid   regurgitation  Pulmonic Valve: Not well visualized. Mild pulmonic regurgitation  Left Atrium: Left atrial enlargement  Right Ventricle: Not well visualized. Grossly normal right ventricular   size and function  Right Atrium: Grossly normal  Diastolic Function: There is evidence of diastolic dysfunction  Pericardium/Pleura: Predominant anterior fat pad. No pericardial effusion  Hemodynamics: The pulmonary artery systolic pressure is estimated to be   52 mmHg, assuming the right atrial pressure is 5 mmHg, consistent with   elevated pulmonary pressures    CONCLUSIONS:  1. Technically difficult study  2. Mild left ventricular concentric hypertrophy  3. Left atrial enlargement  4. Grossly preserved left ventricular systolic function  5. Mitral annular calcification. In some views, the posterior mitral   valve leaflet appears calcified with decreased mobility. Mild mitral   regurgitation. No gradient suggestive of mitral stenosis.  6. Grossly normal right ventricular size and function  7. The pulmonary artery systolic pressure is estimated to be 52 mmHg,   assuming the right atrial pressure is 5 mmHg, consistent with mild to   moderate elevation of pulmonary pressures  8.Calcified aortic valve with decreased opening  9. No pericardial effusion    No prior exam for comparison                      STACI MATIAS   This document has been electronically signed. Oct  6 2017  2:28PM                < end of copied text >

## 2017-10-10 NOTE — PROGRESS NOTE ADULT - PROVIDER SPECIALTY LIST ADULT
Cardiology
Hospitalist
Pulmonology
Cardiology
Hospitalist
Hospitalist

## 2017-10-11 ENCOUNTER — APPOINTMENT (OUTPATIENT)
Dept: HOME HEALTH SERVICES | Facility: HOME HEALTH | Age: 82
End: 2017-10-11
Payer: MEDICARE

## 2017-10-11 ENCOUNTER — APPOINTMENT (OUTPATIENT)
Dept: HOME HEALTH SERVICES | Facility: HOME HEALTH | Age: 82
End: 2017-10-11

## 2017-10-11 VITALS
TEMPERATURE: 98 F | DIASTOLIC BLOOD PRESSURE: 70 MMHG | SYSTOLIC BLOOD PRESSURE: 160 MMHG | RESPIRATION RATE: 18 BRPM | HEART RATE: 78 BPM | OXYGEN SATURATION: 90 %

## 2017-10-11 VITALS
HEART RATE: 78 BPM | TEMPERATURE: 98 F | DIASTOLIC BLOOD PRESSURE: 70 MMHG | OXYGEN SATURATION: 90 % | RESPIRATION RATE: 18 BRPM | SYSTOLIC BLOOD PRESSURE: 160 MMHG

## 2017-10-11 DIAGNOSIS — Z23 ENCOUNTER FOR IMMUNIZATION: ICD-10-CM

## 2017-10-11 DIAGNOSIS — E11.42 TYPE 2 DIABETES MELLITUS WITH DIABETIC POLYNEUROPATHY: ICD-10-CM

## 2017-10-11 DIAGNOSIS — E87.6 HYPOKALEMIA: ICD-10-CM

## 2017-10-11 PROCEDURE — G0008: CPT

## 2017-10-11 PROCEDURE — 90662 IIV NO PRSV INCREASED AG IM: CPT

## 2017-10-11 PROCEDURE — 99496 TRANSJ CARE MGMT HIGH F2F 7D: CPT

## 2017-10-11 RX ORDER — GABAPENTIN 100 MG/1
100 CAPSULE ORAL 3 TIMES DAILY
Qty: 270 | Refills: 3 | Status: DISCONTINUED | COMMUNITY
Start: 2017-07-28 | End: 2017-10-11

## 2017-10-11 RX ORDER — FUROSEMIDE 40 MG
1 TABLET ORAL
Qty: 30 | Refills: 0 | OUTPATIENT
Start: 2017-10-11 | End: 2017-11-10

## 2017-10-11 RX ORDER — POTASSIUM CHLORIDE 1500 MG/1
20 TABLET, EXTENDED RELEASE ORAL
Qty: 6 | Refills: 0 | Status: DISCONTINUED | COMMUNITY
Start: 2017-05-18

## 2017-10-14 DIAGNOSIS — J80 ACUTE RESPIRATORY DISTRESS SYNDROME: ICD-10-CM

## 2017-10-14 DIAGNOSIS — I24.8 OTHER FORMS OF ACUTE ISCHEMIC HEART DISEASE: ICD-10-CM

## 2017-10-14 DIAGNOSIS — I70.0 ATHEROSCLEROSIS OF AORTA: ICD-10-CM

## 2017-10-14 DIAGNOSIS — J96.01 ACUTE RESPIRATORY FAILURE WITH HYPOXIA: ICD-10-CM

## 2017-10-14 DIAGNOSIS — I34.0 NONRHEUMATIC MITRAL (VALVE) INSUFFICIENCY: ICD-10-CM

## 2017-10-14 DIAGNOSIS — E11.51 TYPE 2 DIABETES MELLITUS WITH DIABETIC PERIPHERAL ANGIOPATHY WITHOUT GANGRENE: ICD-10-CM

## 2017-10-14 DIAGNOSIS — E87.6 HYPOKALEMIA: ICD-10-CM

## 2017-10-14 DIAGNOSIS — Z79.82 LONG TERM (CURRENT) USE OF ASPIRIN: ICD-10-CM

## 2017-10-14 DIAGNOSIS — Z79.4 LONG TERM (CURRENT) USE OF INSULIN: ICD-10-CM

## 2017-10-14 DIAGNOSIS — E03.9 HYPOTHYROIDISM, UNSPECIFIED: ICD-10-CM

## 2017-10-14 DIAGNOSIS — I71.9 AORTIC ANEURYSM OF UNSPECIFIED SITE, WITHOUT RUPTURE: ICD-10-CM

## 2017-10-14 DIAGNOSIS — K21.9 GASTRO-ESOPHAGEAL REFLUX DISEASE WITHOUT ESOPHAGITIS: ICD-10-CM

## 2017-10-14 DIAGNOSIS — E78.5 HYPERLIPIDEMIA, UNSPECIFIED: ICD-10-CM

## 2017-10-14 DIAGNOSIS — Z86.718 PERSONAL HISTORY OF OTHER VENOUS THROMBOSIS AND EMBOLISM: ICD-10-CM

## 2017-10-14 DIAGNOSIS — Z79.01 LONG TERM (CURRENT) USE OF ANTICOAGULANTS: ICD-10-CM

## 2017-10-14 DIAGNOSIS — I11.0 HYPERTENSIVE HEART DISEASE WITH HEART FAILURE: ICD-10-CM

## 2017-10-14 DIAGNOSIS — D68.9 COAGULATION DEFECT, UNSPECIFIED: ICD-10-CM

## 2017-10-14 DIAGNOSIS — I25.10 ATHEROSCLEROTIC HEART DISEASE OF NATIVE CORONARY ARTERY WITHOUT ANGINA PECTORIS: ICD-10-CM

## 2017-10-14 DIAGNOSIS — Z87.891 PERSONAL HISTORY OF NICOTINE DEPENDENCE: ICD-10-CM

## 2017-10-14 DIAGNOSIS — J44.1 CHRONIC OBSTRUCTIVE PULMONARY DISEASE WITH (ACUTE) EXACERBATION: ICD-10-CM

## 2017-10-14 DIAGNOSIS — I50.33 ACUTE ON CHRONIC DIASTOLIC (CONGESTIVE) HEART FAILURE: ICD-10-CM

## 2017-10-14 DIAGNOSIS — Z28.21 IMMUNIZATION NOT CARRIED OUT BECAUSE OF PATIENT REFUSAL: ICD-10-CM

## 2017-10-14 DIAGNOSIS — E11.9 TYPE 2 DIABETES MELLITUS WITHOUT COMPLICATIONS: ICD-10-CM

## 2017-10-24 LAB
INR PPP: 2.09 RATIO
PT BLD: 24 SEC

## 2017-10-26 ENCOUNTER — MEDICATION RENEWAL (OUTPATIENT)
Age: 82
End: 2017-10-26

## 2017-11-03 LAB
INR PPP: 2.39 RATIO
PT BLD: 27.5 SEC

## 2017-11-09 ENCOUNTER — APPOINTMENT (OUTPATIENT)
Dept: HOME HEALTH SERVICES | Facility: HOME HEALTH | Age: 82
End: 2017-11-09

## 2017-11-13 ENCOUNTER — APPOINTMENT (OUTPATIENT)
Dept: HOME HEALTH SERVICES | Facility: HOME HEALTH | Age: 82
End: 2017-11-13
Payer: MEDICARE

## 2017-11-13 VITALS
SYSTOLIC BLOOD PRESSURE: 142 MMHG | OXYGEN SATURATION: 96 % | RESPIRATION RATE: 18 BRPM | TEMPERATURE: 98 F | DIASTOLIC BLOOD PRESSURE: 80 MMHG | HEART RATE: 76 BPM

## 2017-11-13 DIAGNOSIS — I70.0 ATHEROSCLEROSIS OF AORTA: ICD-10-CM

## 2017-11-13 DIAGNOSIS — I50.9 HEART FAILURE, UNSPECIFIED: ICD-10-CM

## 2017-11-13 DIAGNOSIS — I71.4 ABDOMINAL AORTIC ANEURYSM, W/OUT RUPTURE: ICD-10-CM

## 2017-11-13 PROCEDURE — 99350 HOME/RES VST EST HIGH MDM 60: CPT

## 2017-11-17 ENCOUNTER — CLINICAL ADVICE (OUTPATIENT)
Age: 82
End: 2017-11-17

## 2017-11-17 ENCOUNTER — LABORATORY RESULT (OUTPATIENT)
Age: 82
End: 2017-11-17

## 2017-11-17 DIAGNOSIS — R79.1 ABNORMAL COAGULATION PROFILE: ICD-10-CM

## 2017-11-17 LAB
INR PPP: 5.86 RATIO
PT BLD: 68.6 SEC

## 2017-11-21 LAB
INR PPP: 1.74 RATIO
PT BLD: 19.9 SEC

## 2017-11-30 ENCOUNTER — APPOINTMENT (OUTPATIENT)
Dept: HOME HEALTH SERVICES | Facility: HOME HEALTH | Age: 82
End: 2017-11-30
Payer: MEDICARE

## 2017-11-30 VITALS
TEMPERATURE: 97.8 F | OXYGEN SATURATION: 89 % | RESPIRATION RATE: 18 BRPM | DIASTOLIC BLOOD PRESSURE: 70 MMHG | SYSTOLIC BLOOD PRESSURE: 132 MMHG | HEART RATE: 78 BPM

## 2017-11-30 DIAGNOSIS — E78.5 TYPE 2 DIABETES MELLITUS WITH OTHER SPECIFIED COMPLICATION: ICD-10-CM

## 2017-11-30 DIAGNOSIS — I50.30 UNSPECIFIED DIASTOLIC (CONGESTIVE) HEART FAILURE: ICD-10-CM

## 2017-11-30 DIAGNOSIS — I82.721 CHRONIC EMBOLISM AND THROMBOSIS OF DEEP VEINS OF RIGHT UPPER EXTREMITY: ICD-10-CM

## 2017-11-30 DIAGNOSIS — J44.9 CHRONIC OBSTRUCTIVE PULMONARY DISEASE, UNSPECIFIED: ICD-10-CM

## 2017-11-30 DIAGNOSIS — I10 ESSENTIAL (PRIMARY) HYPERTENSION: ICD-10-CM

## 2017-11-30 DIAGNOSIS — R09.82 POSTNASAL DRIP: ICD-10-CM

## 2017-11-30 DIAGNOSIS — E11.69 TYPE 2 DIABETES MELLITUS WITH OTHER SPECIFIED COMPLICATION: ICD-10-CM

## 2017-11-30 PROCEDURE — 99350 HOME/RES VST EST HIGH MDM 60: CPT

## 2017-11-30 RX ORDER — GUAIFENESIN 100 MG/5ML
100 SOLUTION ORAL 4 TIMES DAILY
Qty: 400 | Refills: 0 | Status: ACTIVE | COMMUNITY
Start: 2017-04-19

## 2017-11-30 RX ORDER — PREDNISONE 20 MG/1
20 TABLET ORAL
Qty: 30 | Refills: 0 | Status: DISCONTINUED | COMMUNITY
Start: 2017-07-11

## 2017-11-30 RX ORDER — FLUTICASONE PROPIONATE 50 UG/1
50 SPRAY, METERED NASAL TWICE DAILY
Qty: 1 | Refills: 3 | Status: ACTIVE | COMMUNITY
Start: 2017-11-30

## 2017-11-30 RX ORDER — AMLODIPINE BESYLATE 5 MG/1
5 TABLET ORAL DAILY
Qty: 90 | Refills: 3 | Status: ACTIVE | COMMUNITY
Start: 2017-04-19

## 2017-11-30 RX ORDER — IPRATROPIUM BROMIDE AND ALBUTEROL SULFATE 2.5; .5 MG/3ML; MG/3ML
0.5-2.5 (3) SOLUTION RESPIRATORY (INHALATION)
Qty: 180 | Refills: 5 | Status: ACTIVE | COMMUNITY
Start: 2017-04-19

## 2017-11-30 RX ORDER — CEFPODOXIME PROXETIL 200 MG/1
200 TABLET, FILM COATED ORAL
Qty: 20 | Refills: 1 | Status: DISCONTINUED | COMMUNITY
Start: 2017-04-19 | End: 2017-11-30

## 2017-11-30 RX ORDER — PANTOPRAZOLE 40 MG/1
40 TABLET, DELAYED RELEASE ORAL DAILY
Qty: 90 | Refills: 3 | Status: ACTIVE | COMMUNITY
Start: 2017-01-06

## 2017-11-30 RX ORDER — FUROSEMIDE 20 MG/1
20 TABLET ORAL
Qty: 90 | Refills: 0 | Status: DISCONTINUED | COMMUNITY
Start: 2017-05-26

## 2017-11-30 RX ORDER — INSULIN GLARGINE 100 [IU]/ML
100 INJECTION, SOLUTION SUBCUTANEOUS
Qty: 3 | Refills: 11 | Status: ACTIVE | COMMUNITY
Start: 2017-05-08

## 2017-11-30 RX ORDER — CICLOPIROX 7.7 MG/G
0.77 GEL TOPICAL
Qty: 100 | Refills: 0 | Status: COMPLETED | COMMUNITY
Start: 2017-10-14

## 2017-11-30 RX ORDER — ATORVASTATIN CALCIUM 20 MG/1
20 TABLET, FILM COATED ORAL DAILY
Qty: 90 | Refills: 3 | Status: ACTIVE | COMMUNITY
Start: 2017-01-06

## 2017-11-30 RX ORDER — METOPROLOL TARTRATE 50 MG/1
50 TABLET, FILM COATED ORAL TWICE DAILY
Qty: 180 | Refills: 3 | Status: ACTIVE | COMMUNITY
Start: 2017-01-06

## 2017-11-30 RX ORDER — POTASSIUM CHLORIDE 750 MG/1
10 TABLET, FILM COATED, EXTENDED RELEASE ORAL
Qty: 90 | Refills: 3 | Status: ACTIVE | COMMUNITY
Start: 2017-05-18

## 2017-11-30 RX ORDER — PREDNISONE 10 MG/1
10 TABLET ORAL DAILY
Qty: 180 | Refills: 3 | Status: ACTIVE | COMMUNITY
Start: 2017-04-19

## 2017-11-30 RX ORDER — ASPIRIN 81 MG/1
81 TABLET ORAL DAILY
Qty: 90 | Refills: 3 | Status: ACTIVE | COMMUNITY
Start: 2017-04-19

## 2017-12-01 LAB
INR PPP: 2.37 RATIO
PT BLD: 27.2 SEC

## 2017-12-11 ENCOUNTER — MEDICATION RENEWAL (OUTPATIENT)
Age: 82
End: 2017-12-11

## 2017-12-11 RX ORDER — LEVOTHYROXINE SODIUM 0.05 MG/1
50 TABLET ORAL DAILY
Qty: 90 | Refills: 3 | Status: ACTIVE | COMMUNITY
Start: 2017-01-06 | End: 1900-01-01

## 2017-12-11 RX ORDER — BUDESONIDE 90 UG/1
90 AEROSOL, POWDER RESPIRATORY (INHALATION)
Qty: 1 | Refills: 11 | Status: ACTIVE | COMMUNITY
Start: 2017-01-06 | End: 1900-01-01

## 2017-12-15 LAB
INR PPP: 4.91 RATIO
PT BLD: 57.3 SEC

## 2017-12-19 PROCEDURE — 93306 TTE W/DOPPLER COMPLETE: CPT

## 2017-12-19 PROCEDURE — 94760 N-INVAS EAR/PLS OXIMETRY 1: CPT

## 2017-12-19 PROCEDURE — 87040 BLOOD CULTURE FOR BACTERIA: CPT

## 2017-12-19 PROCEDURE — 84100 ASSAY OF PHOSPHORUS: CPT

## 2017-12-19 PROCEDURE — 99285 EMERGENCY DEPT VISIT HI MDM: CPT | Mod: 25

## 2017-12-19 PROCEDURE — 84484 ASSAY OF TROPONIN QUANT: CPT

## 2017-12-19 PROCEDURE — 83036 HEMOGLOBIN GLYCOSYLATED A1C: CPT

## 2017-12-19 PROCEDURE — 85610 PROTHROMBIN TIME: CPT

## 2017-12-19 PROCEDURE — 87581 M.PNEUMON DNA AMP PROBE: CPT

## 2017-12-19 PROCEDURE — 82550 ASSAY OF CK (CPK): CPT

## 2017-12-19 PROCEDURE — 85730 THROMBOPLASTIN TIME PARTIAL: CPT

## 2017-12-19 PROCEDURE — 83880 ASSAY OF NATRIURETIC PEPTIDE: CPT

## 2017-12-19 PROCEDURE — 87086 URINE CULTURE/COLONY COUNT: CPT

## 2017-12-19 PROCEDURE — 93005 ELECTROCARDIOGRAM TRACING: CPT

## 2017-12-19 PROCEDURE — 82962 GLUCOSE BLOOD TEST: CPT

## 2017-12-19 PROCEDURE — 94660 CPAP INITIATION&MGMT: CPT

## 2017-12-19 PROCEDURE — 87633 RESP VIRUS 12-25 TARGETS: CPT

## 2017-12-19 PROCEDURE — 83690 ASSAY OF LIPASE: CPT

## 2017-12-19 PROCEDURE — 96372 THER/PROPH/DIAG INJ SC/IM: CPT | Mod: 59

## 2017-12-19 PROCEDURE — 96376 TX/PRO/DX INJ SAME DRUG ADON: CPT

## 2017-12-19 PROCEDURE — 83605 ASSAY OF LACTIC ACID: CPT

## 2017-12-19 PROCEDURE — 85027 COMPLETE CBC AUTOMATED: CPT

## 2017-12-19 PROCEDURE — 96375 TX/PRO/DX INJ NEW DRUG ADDON: CPT

## 2017-12-19 PROCEDURE — 96374 THER/PROPH/DIAG INJ IV PUSH: CPT

## 2017-12-19 PROCEDURE — 85379 FIBRIN DEGRADATION QUANT: CPT

## 2017-12-19 PROCEDURE — 80076 HEPATIC FUNCTION PANEL: CPT

## 2017-12-19 PROCEDURE — 82803 BLOOD GASES ANY COMBINATION: CPT

## 2017-12-19 PROCEDURE — 81001 URINALYSIS AUTO W/SCOPE: CPT

## 2017-12-19 PROCEDURE — 84145 PROCALCITONIN (PCT): CPT

## 2017-12-19 PROCEDURE — 87486 CHLMYD PNEUM DNA AMP PROBE: CPT

## 2017-12-19 PROCEDURE — 94640 AIRWAY INHALATION TREATMENT: CPT

## 2017-12-19 PROCEDURE — 71045 X-RAY EXAM CHEST 1 VIEW: CPT

## 2017-12-19 PROCEDURE — 87798 DETECT AGENT NOS DNA AMP: CPT

## 2017-12-19 PROCEDURE — 83735 ASSAY OF MAGNESIUM: CPT

## 2017-12-19 PROCEDURE — 80048 BASIC METABOLIC PNL TOTAL CA: CPT

## 2017-12-19 PROCEDURE — 82150 ASSAY OF AMYLASE: CPT

## 2017-12-19 PROCEDURE — 82553 CREATINE MB FRACTION: CPT

## 2017-12-19 PROCEDURE — 36415 COLL VENOUS BLD VENIPUNCTURE: CPT

## 2017-12-19 PROCEDURE — 80053 COMPREHEN METABOLIC PANEL: CPT

## 2017-12-29 LAB
INR PPP: 2.79 RATIO
PT BLD: 32.2 SEC

## 2017-12-29 RX ORDER — FUROSEMIDE 40 MG/1
40 TABLET ORAL
Qty: 135 | Refills: 3 | Status: ACTIVE | COMMUNITY
Start: 2017-01-06 | End: 1900-01-01

## 2018-01-03 ENCOUNTER — CLINICAL ADVICE (OUTPATIENT)
Age: 83
End: 2018-01-03

## 2018-01-03 ENCOUNTER — MEDICATION RENEWAL (OUTPATIENT)
Age: 83
End: 2018-01-03

## 2018-01-03 DIAGNOSIS — R50.9 FEVER, UNSPECIFIED: ICD-10-CM

## 2018-01-03 RX ORDER — OSELTAMIVIR PHOSPHATE 75 MG/1
75 CAPSULE ORAL TWICE DAILY
Qty: 10 | Refills: 0 | Status: ACTIVE | COMMUNITY
Start: 2018-01-03 | End: 1900-01-01

## 2018-01-10 RX ORDER — ALPRAZOLAM 0.25 MG/1
0.25 TABLET ORAL
Qty: 60 | Refills: 0 | Status: ACTIVE | COMMUNITY
Start: 2017-11-13 | End: 1900-01-01

## 2018-01-11 ENCOUNTER — LABORATORY RESULT (OUTPATIENT)
Age: 83
End: 2018-01-11

## 2018-01-25 ENCOUNTER — INPATIENT (INPATIENT)
Facility: HOSPITAL | Age: 83
LOS: 4 days | Discharge: NURSING, REHAB OR LONG-TERM CARE | DRG: 194 | End: 2018-01-30
Attending: INTERNAL MEDICINE | Admitting: INTERNAL MEDICINE
Payer: MEDICARE

## 2018-01-25 VITALS
SYSTOLIC BLOOD PRESSURE: 120 MMHG | RESPIRATION RATE: 22 BRPM | OXYGEN SATURATION: 92 % | HEART RATE: 111 BPM | DIASTOLIC BLOOD PRESSURE: 54 MMHG | TEMPERATURE: 102 F

## 2018-01-25 DIAGNOSIS — I10 ESSENTIAL (PRIMARY) HYPERTENSION: ICD-10-CM

## 2018-01-25 DIAGNOSIS — Z29.9 ENCOUNTER FOR PROPHYLACTIC MEASURES, UNSPECIFIED: ICD-10-CM

## 2018-01-25 DIAGNOSIS — A41.9 SEPSIS, UNSPECIFIED ORGANISM: ICD-10-CM

## 2018-01-25 DIAGNOSIS — E78.5 HYPERLIPIDEMIA, UNSPECIFIED: ICD-10-CM

## 2018-01-25 DIAGNOSIS — J44.9 CHRONIC OBSTRUCTIVE PULMONARY DISEASE, UNSPECIFIED: ICD-10-CM

## 2018-01-25 DIAGNOSIS — I82.409 ACUTE EMBOLISM AND THROMBOSIS OF UNSPECIFIED DEEP VEINS OF UNSPECIFIED LOWER EXTREMITY: ICD-10-CM

## 2018-01-25 DIAGNOSIS — I50.9 HEART FAILURE, UNSPECIFIED: ICD-10-CM

## 2018-01-25 DIAGNOSIS — E11.9 TYPE 2 DIABETES MELLITUS WITHOUT COMPLICATIONS: ICD-10-CM

## 2018-01-25 LAB
ALBUMIN SERPL ELPH-MCNC: 2.8 G/DL — LOW (ref 3.3–5)
ALP SERPL-CCNC: 72 U/L — SIGNIFICANT CHANGE UP (ref 40–120)
ALT FLD-CCNC: 27 U/L — SIGNIFICANT CHANGE UP (ref 12–78)
ANION GAP SERPL CALC-SCNC: 9 MMOL/L — SIGNIFICANT CHANGE UP (ref 5–17)
APTT BLD: 35.8 SEC — SIGNIFICANT CHANGE UP (ref 27.5–37.4)
AST SERPL-CCNC: 30 U/L — SIGNIFICANT CHANGE UP (ref 15–37)
BASOPHILS # BLD AUTO: 0.1 K/UL — SIGNIFICANT CHANGE UP (ref 0–0.2)
BASOPHILS NFR BLD AUTO: 0.7 % — SIGNIFICANT CHANGE UP (ref 0–2)
BILIRUB SERPL-MCNC: 0.6 MG/DL — SIGNIFICANT CHANGE UP (ref 0.2–1.2)
BUN SERPL-MCNC: 25 MG/DL — HIGH (ref 7–23)
CALCIUM SERPL-MCNC: 7.8 MG/DL — LOW (ref 8.5–10.1)
CHLORIDE SERPL-SCNC: 104 MMOL/L — SIGNIFICANT CHANGE UP (ref 96–108)
CO2 SERPL-SCNC: 27 MMOL/L — SIGNIFICANT CHANGE UP (ref 22–31)
CREAT SERPL-MCNC: 1.4 MG/DL — HIGH (ref 0.5–1.3)
EOSINOPHIL # BLD AUTO: 0 K/UL — SIGNIFICANT CHANGE UP (ref 0–0.5)
EOSINOPHIL NFR BLD AUTO: 0.4 % — SIGNIFICANT CHANGE UP (ref 0–6)
FLUAV H1 2009 PAND RNA SPEC QL NAA+PROBE: DETECTED
GLUCOSE SERPL-MCNC: 180 MG/DL — HIGH (ref 70–99)
HCT VFR BLD CALC: 41.2 % — SIGNIFICANT CHANGE UP (ref 34.5–45)
HGB BLD-MCNC: 13.7 G/DL — SIGNIFICANT CHANGE UP (ref 11.5–15.5)
INR BLD: 2.82 RATIO — HIGH (ref 0.88–1.16)
LACTATE SERPL-SCNC: 3.1 MMOL/L — HIGH (ref 0.7–2)
LIDOCAIN IGE QN: SIGNIFICANT CHANGE UP U/L (ref 73–393)
LYMPHOCYTES # BLD AUTO: 0.4 K/UL — LOW (ref 1–3.3)
LYMPHOCYTES # BLD AUTO: 3.6 % — LOW (ref 13–44)
MCHC RBC-ENTMCNC: 29.8 PG — SIGNIFICANT CHANGE UP (ref 27–34)
MCHC RBC-ENTMCNC: 33.2 GM/DL — SIGNIFICANT CHANGE UP (ref 32–36)
MCV RBC AUTO: 89.8 FL — SIGNIFICANT CHANGE UP (ref 80–100)
MONOCYTES # BLD AUTO: 0.6 K/UL — SIGNIFICANT CHANGE UP (ref 0–0.9)
MONOCYTES NFR BLD AUTO: 5.3 % — SIGNIFICANT CHANGE UP (ref 1–9)
NEUTROPHILS # BLD AUTO: 10.8 K/UL — HIGH (ref 1.8–7.4)
NEUTROPHILS NFR BLD AUTO: 90 % — HIGH (ref 43–77)
PLATELET # BLD AUTO: 184 K/UL — SIGNIFICANT CHANGE UP (ref 150–400)
POTASSIUM SERPL-MCNC: 3.6 MMOL/L — SIGNIFICANT CHANGE UP (ref 3.5–5.3)
POTASSIUM SERPL-SCNC: 3.6 MMOL/L — SIGNIFICANT CHANGE UP (ref 3.5–5.3)
PROT SERPL-MCNC: 6 G/DL — SIGNIFICANT CHANGE UP (ref 6–8.3)
PROTHROM AB SERPL-ACNC: 31.4 SEC — HIGH (ref 9.8–12.7)
RAPID RVP RESULT: DETECTED
RBC # BLD: 4.59 M/UL — SIGNIFICANT CHANGE UP (ref 3.8–5.2)
RBC # FLD: 14.9 % — HIGH (ref 10.3–14.5)
SODIUM SERPL-SCNC: 140 MMOL/L — SIGNIFICANT CHANGE UP (ref 135–145)
WBC # BLD: 12 K/UL — HIGH (ref 3.8–10.5)
WBC # FLD AUTO: 12 K/UL — HIGH (ref 3.8–10.5)

## 2018-01-25 PROCEDURE — 71250 CT THORAX DX C-: CPT | Mod: 26

## 2018-01-25 PROCEDURE — 71045 X-RAY EXAM CHEST 1 VIEW: CPT | Mod: 26

## 2018-01-25 PROCEDURE — 99223 1ST HOSP IP/OBS HIGH 75: CPT | Mod: AI,GC

## 2018-01-25 PROCEDURE — 93970 EXTREMITY STUDY: CPT | Mod: 26

## 2018-01-25 PROCEDURE — 93010 ELECTROCARDIOGRAM REPORT: CPT

## 2018-01-25 PROCEDURE — 99285 EMERGENCY DEPT VISIT HI MDM: CPT

## 2018-01-25 RX ORDER — CEFTRIAXONE 500 MG/1
1 INJECTION, POWDER, FOR SOLUTION INTRAMUSCULAR; INTRAVENOUS EVERY 24 HOURS
Qty: 0 | Refills: 0 | Status: DISCONTINUED | OUTPATIENT
Start: 2018-01-25 | End: 2018-01-26

## 2018-01-25 RX ORDER — AZITHROMYCIN 500 MG/1
500 TABLET, FILM COATED ORAL EVERY 24 HOURS
Qty: 0 | Refills: 0 | Status: DISCONTINUED | OUTPATIENT
Start: 2018-01-25 | End: 2018-01-26

## 2018-01-25 RX ORDER — BUDESONIDE, MICRONIZED 100 %
0.5 POWDER (GRAM) MISCELLANEOUS
Qty: 0 | Refills: 0 | Status: DISCONTINUED | OUTPATIENT
Start: 2018-01-25 | End: 2018-01-30

## 2018-01-25 RX ORDER — SODIUM CHLORIDE 9 MG/ML
500 INJECTION INTRAMUSCULAR; INTRAVENOUS; SUBCUTANEOUS
Qty: 0 | Refills: 0 | Status: COMPLETED | OUTPATIENT
Start: 2018-01-25 | End: 2018-01-25

## 2018-01-25 RX ORDER — DEXTROSE 50 % IN WATER 50 %
12.5 SYRINGE (ML) INTRAVENOUS ONCE
Qty: 0 | Refills: 0 | Status: DISCONTINUED | OUTPATIENT
Start: 2018-01-25 | End: 2018-01-30

## 2018-01-25 RX ORDER — VANCOMYCIN HCL 1 G
1000 VIAL (EA) INTRAVENOUS ONCE
Qty: 0 | Refills: 0 | Status: DISCONTINUED | OUTPATIENT
Start: 2018-01-25 | End: 2018-01-25

## 2018-01-25 RX ORDER — WARFARIN SODIUM 2.5 MG/1
1 TABLET ORAL
Qty: 0 | Refills: 0 | COMMUNITY

## 2018-01-25 RX ORDER — DEXTROSE 50 % IN WATER 50 %
25 SYRINGE (ML) INTRAVENOUS ONCE
Qty: 0 | Refills: 0 | Status: DISCONTINUED | OUTPATIENT
Start: 2018-01-25 | End: 2018-01-30

## 2018-01-25 RX ORDER — INSULIN GLARGINE 100 [IU]/ML
35 INJECTION, SOLUTION SUBCUTANEOUS
Qty: 0 | Refills: 0 | COMMUNITY

## 2018-01-25 RX ORDER — ACETAMINOPHEN 500 MG
650 TABLET ORAL ONCE
Qty: 0 | Refills: 0 | Status: COMPLETED | OUTPATIENT
Start: 2018-01-25 | End: 2018-01-25

## 2018-01-25 RX ORDER — MORPHINE SULFATE 50 MG/1
4 CAPSULE, EXTENDED RELEASE ORAL ONCE
Qty: 0 | Refills: 0 | Status: DISCONTINUED | OUTPATIENT
Start: 2018-01-25 | End: 2018-01-25

## 2018-01-25 RX ORDER — IPRATROPIUM BROMIDE 0.2 MG/ML
500 SOLUTION, NON-ORAL INHALATION EVERY 6 HOURS
Qty: 0 | Refills: 0 | Status: DISCONTINUED | OUTPATIENT
Start: 2018-01-25 | End: 2018-01-25

## 2018-01-25 RX ORDER — SODIUM CHLORIDE 9 MG/ML
1000 INJECTION, SOLUTION INTRAVENOUS
Qty: 0 | Refills: 0 | Status: DISCONTINUED | OUTPATIENT
Start: 2018-01-25 | End: 2018-01-30

## 2018-01-25 RX ORDER — PIPERACILLIN AND TAZOBACTAM 4; .5 G/20ML; G/20ML
3.38 INJECTION, POWDER, LYOPHILIZED, FOR SOLUTION INTRAVENOUS ONCE
Qty: 0 | Refills: 0 | Status: COMPLETED | OUTPATIENT
Start: 2018-01-25 | End: 2018-01-25

## 2018-01-25 RX ORDER — BUDESONIDE, MICRONIZED 100 %
2 POWDER (GRAM) MISCELLANEOUS
Qty: 0 | Refills: 0 | COMMUNITY

## 2018-01-25 RX ORDER — WARFARIN SODIUM 2.5 MG/1
2 TABLET ORAL ONCE
Qty: 0 | Refills: 0 | Status: COMPLETED | OUTPATIENT
Start: 2018-01-25 | End: 2018-01-25

## 2018-01-25 RX ORDER — SODIUM CHLORIDE 9 MG/ML
3 INJECTION INTRAMUSCULAR; INTRAVENOUS; SUBCUTANEOUS ONCE
Qty: 0 | Refills: 0 | Status: COMPLETED | OUTPATIENT
Start: 2018-01-25 | End: 2018-01-25

## 2018-01-25 RX ORDER — VANCOMYCIN HCL 1 G
1000 VIAL (EA) INTRAVENOUS ONCE
Qty: 0 | Refills: 0 | Status: COMPLETED | OUTPATIENT
Start: 2018-01-25 | End: 2018-01-25

## 2018-01-25 RX ORDER — DEXTROSE 50 % IN WATER 50 %
1 SYRINGE (ML) INTRAVENOUS ONCE
Qty: 0 | Refills: 0 | Status: DISCONTINUED | OUTPATIENT
Start: 2018-01-25 | End: 2018-01-30

## 2018-01-25 RX ORDER — GLUCAGON INJECTION, SOLUTION 0.5 MG/.1ML
1 INJECTION, SOLUTION SUBCUTANEOUS ONCE
Qty: 0 | Refills: 0 | Status: DISCONTINUED | OUTPATIENT
Start: 2018-01-25 | End: 2018-01-30

## 2018-01-25 RX ORDER — LEVOTHYROXINE SODIUM 125 MCG
50 TABLET ORAL DAILY
Qty: 0 | Refills: 0 | Status: DISCONTINUED | OUTPATIENT
Start: 2018-01-25 | End: 2018-01-30

## 2018-01-25 RX ORDER — ATORVASTATIN CALCIUM 80 MG/1
20 TABLET, FILM COATED ORAL AT BEDTIME
Qty: 0 | Refills: 0 | Status: DISCONTINUED | OUTPATIENT
Start: 2018-01-25 | End: 2018-01-30

## 2018-01-25 RX ORDER — IPRATROPIUM/ALBUTEROL SULFATE 18-103MCG
3 AEROSOL WITH ADAPTER (GRAM) INHALATION EVERY 6 HOURS
Qty: 0 | Refills: 0 | Status: DISCONTINUED | OUTPATIENT
Start: 2018-01-25 | End: 2018-01-26

## 2018-01-25 RX ORDER — INSULIN LISPRO 100/ML
VIAL (ML) SUBCUTANEOUS
Qty: 0 | Refills: 0 | Status: DISCONTINUED | OUTPATIENT
Start: 2018-01-25 | End: 2018-01-26

## 2018-01-25 RX ORDER — PANTOPRAZOLE SODIUM 20 MG/1
40 TABLET, DELAYED RELEASE ORAL
Qty: 0 | Refills: 0 | Status: DISCONTINUED | OUTPATIENT
Start: 2018-01-25 | End: 2018-01-30

## 2018-01-25 RX ADMIN — SODIUM CHLORIDE 2000 MILLILITER(S): 9 INJECTION INTRAMUSCULAR; INTRAVENOUS; SUBCUTANEOUS at 17:06

## 2018-01-25 RX ADMIN — Medication 250 MILLIGRAM(S): at 13:18

## 2018-01-25 RX ADMIN — SODIUM CHLORIDE 2000 MILLILITER(S): 9 INJECTION INTRAMUSCULAR; INTRAVENOUS; SUBCUTANEOUS at 22:05

## 2018-01-25 RX ADMIN — SODIUM CHLORIDE 3 MILLILITER(S): 9 INJECTION INTRAMUSCULAR; INTRAVENOUS; SUBCUTANEOUS at 12:56

## 2018-01-25 RX ADMIN — SODIUM CHLORIDE 2000 MILLILITER(S): 9 INJECTION INTRAMUSCULAR; INTRAVENOUS; SUBCUTANEOUS at 15:39

## 2018-01-25 RX ADMIN — Medication 650 MILLIGRAM(S): at 13:18

## 2018-01-25 RX ADMIN — Medication 3 MILLILITER(S): at 13:26

## 2018-01-25 RX ADMIN — Medication 125 MILLIGRAM(S): at 13:18

## 2018-01-25 RX ADMIN — PIPERACILLIN AND TAZOBACTAM 200 GRAM(S): 4; .5 INJECTION, POWDER, LYOPHILIZED, FOR SOLUTION INTRAVENOUS at 13:18

## 2018-01-25 RX ADMIN — SODIUM CHLORIDE 2000 MILLILITER(S): 9 INJECTION INTRAMUSCULAR; INTRAVENOUS; SUBCUTANEOUS at 19:14

## 2018-01-25 RX ADMIN — SODIUM CHLORIDE 2000 MILLILITER(S): 9 INJECTION INTRAMUSCULAR; INTRAVENOUS; SUBCUTANEOUS at 12:57

## 2018-01-25 RX ADMIN — WARFARIN SODIUM 2 MILLIGRAM(S): 2.5 TABLET ORAL at 23:30

## 2018-01-25 NOTE — H&P ADULT - PROBLEM SELECTOR PLAN 1
- f/u blood culture  - f/u urine culture  - f/u urinalysis   - - f/u RVP   - f/u blood culture  - f/u urine culture  - f/u urinalysis   - IVF held due to fluid overload  - f/u lactate  - f/u AM labs - continue vanco/zosyn pending culture result   - f/u RVP   - f/u blood culture  - f/u urine culture  - f/u urinalysis   - IVF held due to fluid overload  - f/u lactate  - f/u AM labs  - f/u procalcitonin - continue ceftriaxone/zithromax pending culture result   - f/u RVP   - f/u blood culture  - f/u urine culture  - f/u urinalysis   - IVF held due to fluid overload  - f/u lactate  - f/u AM labs  - f/u procalcitonin  - f/u CT chest - continue ceftriaxone/zithromax pending culture result   - f/u RVP   - f/u blood culture  - f/u urine culture  - f/u urinalysis   - IVF held due to fluid overload  - f/u lactate  - f/u AM labs  - f/u procalcitonin  - f/u CT chest  - f/u urine legionella and strep antigen - continue ceftriaxone/zithromax pending culture result   - f/u RVP   - f/u blood culture  - f/u urine culture  - f/u urinalysis   - IVF held due to fluid overload  - f/u lactate  - f/u AM labs  - f/u procalcitonin  - f/u CT chest  - f/u urine legionella and strep antigen  - f/u bladder scan

## 2018-01-25 NOTE — H&P ADULT - PROBLEM SELECTOR PLAN 2
- continue duoneb Q6   - continue supplemental O2  - solumedrol   - f/u pulm consult - continue duoneb Q6   - continue supplemental O2  - solumedrol   - f/u pulm consult Dr. Kramer - continue duoneb Q6   - continue supplemental O2  - solumedrol IV   - f/u pulm consult Dr. Kramer - continue duoneb Q6   - continue supplemental O2  - solumedrol IV   iv zithromax , ceftriaxone   - f/u pulm consult Dr. Kramer

## 2018-01-25 NOTE — ED PROVIDER NOTE - PROGRESS NOTE DETAILS
repeat focus sepsis exam improved, rr 18, hr 78, bp remians stable, o2 96%2 lnc, fluid status not worsening

## 2018-01-25 NOTE — H&P ADULT - PROBLEM SELECTOR PLAN 8
- dvt ppx with coumadin   - PT eval   IMPROVE VTE Individual Risk Assessment          RISK                                                          Points    [x] Previous VTE                                                3  [  ] Thrombophilia                                             2  [  ] Lower limb paralysis                                   2        (unable to hold up >15 seconds)    [  ] Current Cancer                                            2         (within 6 months)  [x] Immobilization > 24 hrs                              1  [  ] ICU/CCU stay > 24 hours                            1  [ x] Age > 60                                                    1    IMPROVE VTE Score _5

## 2018-01-25 NOTE — ED ADULT NURSE NOTE - OBJECTIVE STATEMENT
pt states started feeling ill yesterday, chronic COPD w/ cough worsening last night, awoke today unable to maintain baseline O2 on 4L. pt A+Ox4, c/o generalized body aches, denies CP. +SHOB

## 2018-01-25 NOTE — CONSULT NOTE ADULT - ASSESSMENT
RADHA GRAVES [facility]    ALLERGY nka   CONTACT Son Jose [phone]   REASON FOR VISIT   Initial evaluation/Pulmonary consultation requested  1/25/2018 by Dr Kowalski from Dr Kramer   Patient examined chart reviewed  HOSPITAL ADMISSION [facility] Dr Lamin Melton 1/25/2018   PATIENT CAME  FROM (if information available)    PAST MEDICAL & SURGICAL HISTORY:  Insulin dependent diabetes mellitus  HLD (hyperlipidemia)  HTN (hypertension)  COPD (chronic obstructive pulmonary disease)  Emphysema  DVT (deep venous thrombosis)  No significant past surgical history  VITALS/LABS  1/25/2018 W 12 Hb 13.7 Plt 184    PATIENT DESCRIPTION CC HPI PMH SOCIAL 1/25/2018 ADMISSION [facility]   81 y.o. 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, ho HTN, HLD, IDDM  hosp [facility] 7/23-7/28/2016 with ac bronchitis   readmitted [facility] 9/26-10/11/2016 with hemoptysis (while on coumadin) SOB    Had coumadin coagulopathy on arrival Rxed with [facility]CC FFP Went into septic or cardiac shock 9/29 intubated required levophed and  9/29 Poss Takosubo CMPTHY Rxed Dobutamine Readmitted 10/4-10/10/2017 with SOB  AC RESP FAILURE (HYPOXEMIC) Managed with NIV Weaned RESP TRACT BACTERIAL INFECTION Excluded Neg pct Neg RVP  DYSPNEA Likely sec COPD Ex v ADHF although CXR showed lung fields to be clear VTE unlikely as INR was 4 on arrival COPD EX Managed with BD steroids   HOME MEDS Duoneb norvasc 5 asa 81lipitor 20 pulmicort lasix 20 sl scale Lantus 35 levoxyl 50 metoprolol 50.2 protonix 40 warfarin 1   HPI 1/25/2018 ADMISSION [facility]   Presented with SOB, worsening b/l LE edema despite self medicating with increased doses of lasix . HO sick grandchild contact   1/25/2018 In ER Febrile to 101.7 W 12 LA 3.1 Cr 1.4  CXR showed hronic bibasilar interstitial prominence similar to prior   1/25/2018 In ER started on Vanco zosyn     HPI DETAILS 1/25/2018   Patient was present with her family at bedside. Stated that her worsening SOB started today morning despite NC and nebulizing treatment. Patient also noted that her b/l LE worsened in the last few days, son increased her lasix to 80mg daily for over a week without improvement. Son also noted that despite increase in lasix dose, he noticed that patient has decrease in urine output. Patient stated that she has trouble walking in the past few days due to increase b/l LE edema and pain. Normally patient able to ambulate from bed to bathroom before becoming SOB. Patient stated that her SOB has improved since coming to the ED, however, her LE pain and edema has not resolved. Also endorse in worsening dry cough over the past few days with very little sputum production. Patient's son stated that patient had cough a few weeks ago and was evaluated by home care service and was started on an antibiotic. Stated that her granddaughter has been sick at home. Patient also noted 2 episodes of diarrhea today morning. Denies any chest pain, dizziness, headache, nausea, vomiting, abdominal pain, hematuria, melena, or hematochezia.   In the ED, Patient presented with T101.7, 111bpm, 120/54, RR 22@92% NC   WBC 12, h/h 13.7/41.2, plt 184  INR 2.82  lactate 3.1  Na 140, K 3.6, BUN 25, Cr 1.4, glucose 180, GFR 35  CXR showed shallow inspiration, atherosclerotic aorta. Nonspecific chronic bibasilar interstitial prominence similar to prior. No focal consolidation or pleural effusion.  EKG showed sinus rhythm   Patient was started on IVF  bolus, duoneb Q6, 1x vanco, 1x zosyn, 1x solumedrol IV, and is NPO  HOSPITAL COURSE PROBLEM ASSESSMENT PLAN 1/25/2018 ADMISSION [facility]                                                           FLU  1/25 AH3 Positive   1/25 Rx with Tamiflu   POSSIBLE PNEUMONIA  1/25/2018 CXR Non sp chr bibasal IS prominence similar to 10/4/2017 azithro (1/25) rocephin (1/25)   1/25/2018 Will check pct and c results to determine if there is bacterial superinfection   LACTICEMIA SEC SEPSIS V d CHF  1/25 LA 3.1   1/25/2018 NS 2.5 l ordered (1/25 12p)   1/25 Monitor serially  RECURRENT VTE  1/25    Warfarin (1/25)   COPD   Duoneb.4 (1/25) Solumed 40.3 (1/25)   CAD  Atorvastat 20 (1/25)   CHF  10/6/2017 ECHO   EF 55 RVSP 52   HYPOTHYROIDISM   Levoxyl 50 (1/25)   GLOBAL ISSUE/BEST PRACTICE:        PROBLEM: Analgesia:     na                        PROBLEM: Sedation:     na               PROBLEM: HOB elevation:   y             PROBLEM: Stress ulcer proph:    protonix 40 (1/25)                       PROBLEM: VTE prophylaxis:      na                  PROBLEM: Glycemic control:    iss (1/25)    PROBLEM: Nutrition:    consistent carb MOODY (1/25)           PROBLEM: Advanced directive: na     PROBLEM: Allergies:  na      TIME SPENT Over 25 minutes aggregate care time spent on encounter; activities included   direct patient care, counseling and/or coordinating care reviewing notes, lab data/ imaging , discussion with multidisciplinary team/ patient  /family. Risks, benefits, alternatives  discussed in detail. Proper antibiotic use issues addressed Questions/concerns  were addressed  as  best as possible   PATIENT DESCRIPTION CC HPI PMH SOCIAL 1/25/2018 ADMISSION [facility]   81 y.o. 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, ho HTN, HLD, IDDM  hosp [facility] 7/23-7/28/2016 with ac bronchitis   readmitted [facility] 9/26-10/11/2016 with hemoptysis (while on coumadin) SOB    Had coumadin coagulopathy on arrival Rxed with PCC FFP Went into septic or cardiac shock 9/29 intubated required levophed and  9/29 Poss Takosubo CMPTHY Rxed Dobutamine Readmitted 10/4-10/10/2017 with SOB  AC RESP FAILURE (HYPOXEMIC) Managed with NIV Weaned RESP TRACT BACTERIAL INFECTION Excluded Neg pct Neg RVP  DYSPNEA Likely sec COPD Ex v ADHF although CXR showed lung fields to be clear VTE unlikely as INR was 4 on arrival COPD EX Managed with BD steroids   HOME MEDS Duoneb norvasc 5 asa 81lipitor 20 pulmicort lasix 20 sl scale Lantus 35 levoxyl 50 metoprolol 50.2 protonix 40 warfarin 1   HPI 1/25/2018 ADMISSION [facility]   Presented with SOB, worsening b/l LE edema despite self medicating with increased doses of lasix . HO sick grandchild contact   1/25/2018 In ER Febrile to 101.7 W 12 LA 3.1 Cr 1.4  CXR showed hronic bibasilar interstitial prominence similar to prior   1/25/2018 In ER started on Vanco zosyn   SUMMARY ASSESSMENT PLAN 1/25/2018  ADMISSION [facility]   FLU Tamiflu (1/25)   PNEUMONIA Azithro (1/25) Rocephin (1/25)   LACTICEMIA FC (1/25)  COPD BD CS (1/25)

## 2018-01-25 NOTE — H&P ADULT - NSHPREVIEWOFSYSTEMS_GEN_ALL_CORE
Constitutional: positive Fever, denies chills, diaphoresis   HEENT: denies blurry vision, difficulty hearing  Respiratory: positive SOB, HIDALGO, cough, denies sputum production, wheezing, hemoptysis  Cardiovascular: positive worsening b/l LE edema, denies CP, palpitations  Gastrointestinal: reported 2 episodes of nonbloody diarrhea this morning, denies nausea, vomiting, constipation, abdominal pain, melena, hematochezia   Genitourinary: denies dysuria, frequency, urgency, hematuria   Musculoskeletal: positive b/l LE pain and edema   Neurologic: denies headache, weakness, dizziness, paresthesias, numbness/tingling  ROS negative except as noted above

## 2018-01-25 NOTE — H&P ADULT - ATTENDING COMMENTS
pt us 84 year old female with h/o copd on home oxygen , chf , recurrent dvt on coumadin admitted for  sepsis , copd exacerbation   will give iv Zithromax , ceftriaxone , iv steroids , duoneb , c/w oxygen   check ct chest to r.o pneumonia  pulm consult   check and f/u bnp , trend cardiac enzymes , cardiology consult   c/w coumadin   f/u doppler b/l le   fall precaution

## 2018-01-25 NOTE — H&P ADULT - NSHPPHYSICALEXAM_GEN_ALL_CORE
Physical Exam:  General: Obese, Well developed, well nourished, sitting in bed, appears comfortable on NC   HEENT: NCAT, PERRLA, EOMI bl, moist mucous membranes   Neck: Supple, nontender, no mass  Neurology: A&Ox3, nonfocal, CN II-XII grossly intact, sensation intact  Respiratory: Decreased breath sounds b/l  CV: RRR, +S1/S2, no murmurs, rubs or gallops  Abdominal: Soft, NT, ND +BSx4  Extremities: b/l 3+ LE edema, positive b/l tenderness to palpation of LE   Skin: warm, dry

## 2018-01-25 NOTE — H&P ADULT - NSHPSOCIALHISTORY_GEN_ALL_CORE
Former smoker 1 ppd, quit 2015  denies alcohol or drug abuse  lives at home with son and family   ambulates to bathroom, but requires wheelchair transport  on home O2

## 2018-01-25 NOTE — ED ADULT NURSE REASSESSMENT NOTE - NS ED NURSE REASSESS COMMENT FT1
BC sent at 1230 & 1245 w/ additional labs
Bladder scan 300 ml in bladder. Patient stated she is about to urinate now.
Patient brought back from ultrasound.
Patient lying comfortably in bed attached to cardiac monitor informed of the plan of care with understanding with son at the bedside.

## 2018-01-25 NOTE — H&P ADULT - PMH
CHF (congestive heart failure)    COPD (chronic obstructive pulmonary disease)    DVT (deep venous thrombosis)    Emphysema    HLD (hyperlipidemia)    HTN (hypertension)    Insulin dependent diabetes mellitus CHF (congestive heart failure)    COPD (chronic obstructive pulmonary disease)    DVT (deep venous thrombosis)    Emphysema    HLD (hyperlipidemia)    HTN (hypertension)    Insulin dependent diabetes mellitus    Type 2 diabetes mellitus

## 2018-01-25 NOTE — H&P ADULT - HISTORY OF PRESENT ILLNESS
This is an 82 y.o. 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 Patient was present with her family at bedside. Stated that her worsening SOB started today morning despite NC and nebulizing treatment. Patient also noted that her b/l LE worsened in the last few days, son increased her lasix to 80mg daily for over a week without improvement. Son also noted that despite increase in lasix dose, he noticed that patient has decrease in urine output. Patient stated that she has trouble walking in the past few days due to increase b/l LE edema and pain. Normally patient able to ambulate from bed to bathroom before becoming SOB. Patient stated that her SOB has improved since coming to the ED, however, her LE pain and edema has not resolved. Also endorse in worsening dry cough over the past few days with very little sputum production. Patient's son stated that patient had cough a few weeks ago and was evaluated by home care service and was started on an antibiotic. Stated that her granddaughter has been sick at home. Patient also noted 2 episodes of diarrhea today morning. Denies any chest pain, dizziness, headache, nausea, vomiting, abdominal pain, hematuria, melena, or hematochezia.     In the ED, Patient presented with T101.7, 111bpm, 120/54, RR 22@92% NC   WBC 12, h/h 13.7/41.2, plt 184  INR 2.82  lactate 3.1  Na 140, K 3.6, BUN 9, Cr 1.4, glucose 180, GFR 35  CXR showed shallow inspiration, atherosclerotic aorta. Nonspecific chronic bibasilar interstitial prominence similar to prior. No focal consolidation or pleural effusion.  EKG showed sinus rhythm   Patient was started on IVF  bolus, duoneb Q6, and is NPO This is an 82 y.o. 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 Patient was present with her family at bedside. Stated that her worsening SOB started today morning despite NC and nebulizing treatment. Patient also noted that her b/l LE worsened in the last few days, son increased her lasix to 80mg daily for over a week without improvement. Son also noted that despite increase in lasix dose, he noticed that patient has decrease in urine output. Patient stated that she has trouble walking in the past few days due to increase b/l LE edema and pain. Normally patient able to ambulate from bed to bathroom before becoming SOB. Patient stated that her SOB has improved since coming to the ED, however, her LE pain and edema has not resolved. Also endorse in worsening dry cough over the past few days with very little sputum production. Patient's son stated that patient had cough a few weeks ago and was evaluated by home care service and was started on an antibiotic. Stated that her granddaughter has been sick at home. Patient also noted 2 episodes of diarrhea today morning. Denies any chest pain, dizziness, headache, nausea, vomiting, abdominal pain, hematuria, melena, or hematochezia.     In the ED, Patient presented with T101.7, 111bpm, 120/54, RR 22@92% NC   WBC 12, h/h 13.7/41.2, plt 184  INR 2.82  lactate 3.1  Na 140, K 3.6, BUN 25, Cr 1.4, glucose 180, GFR 35  CXR showed shallow inspiration, atherosclerotic aorta. Nonspecific chronic bibasilar interstitial prominence similar to prior. No focal consolidation or pleural effusion.  EKG showed sinus rhythm   Patient was started on IVF  bolus, duoneb Q6, and is NPO This is an 82 y.o. 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 Patient was present with her family at bedside. Stated that her worsening SOB started today morning despite NC and nebulizing treatment. Patient also noted that her b/l LE worsened in the last few days, son increased her lasix to 80mg daily for over a week without improvement. Son also noted that despite increase in lasix dose, he noticed that patient has decrease in urine output. Patient stated that she has trouble walking in the past few days due to increase b/l LE edema and pain. Normally patient able to ambulate from bed to bathroom before becoming SOB. Patient stated that her SOB has improved since coming to the ED, however, her LE pain and edema has not resolved. Also endorse in worsening dry cough over the past few days with very little sputum production. Patient's son stated that patient had cough a few weeks ago and was evaluated by home care service and was started on an antibiotic. Stated that her granddaughter has been sick at home. Patient also noted 2 episodes of diarrhea today morning. Denies any chest pain, dizziness, headache, nausea, vomiting, abdominal pain, hematuria, melena, or hematochezia.     In the ED, Patient presented with T101.7, 111bpm, 120/54, RR 22@92% NC   WBC 12, h/h 13.7/41.2, plt 184  INR 2.82  lactate 3.1  Na 140, K 3.6, BUN 25, Cr 1.4, glucose 180, GFR 35  CXR showed shallow inspiration, atherosclerotic aorta. Nonspecific chronic bibasilar interstitial prominence similar to prior. No focal consolidation or pleural effusion.  EKG showed sinus rhythm   Patient was started on IVF  bolus, duoneb Q6, 1x vanco, 1x zosyn, 1x solumedrol IV, and is NPO This is an 82 y.o. F with PMH of COPD on 3-4L home O2, CHF, recurrent DVT on coumadin, T2DM, HTN, HLD, AAA who presents to the ED with SOB, worsening b/l LE edema. Patient was present with her family at bedside. Stated that her worsening SOB started today morning despite NC and nebulizing treatment. Patient also noted that her b/l LE worsened in the last few days, son increased her lasix to 80mg daily for over a week without improvement. Son also noted that despite increase in lasix dose, he noticed that patient has decrease in urine output. Patient stated that she has trouble walking in the past few days due to increase b/l LE edema and pain. Normally patient able to ambulate from bed to bathroom before becoming SOB. Patient stated that her SOB has improved since coming to the ED, however, her LE pain and edema has not resolved. Also endorse in worsening dry cough over the past few days with very little sputum production. Patient's son stated that patient had cough a few weeks ago and was evaluated by home care service and was started on an antibiotic. Stated that her granddaughter has been sick at home. Patient also noted 2 episodes of diarrhea today morning. Denies any chest pain, dizziness, headache, nausea, vomiting, abdominal pain, hematuria, melena, or hematochezia.     In the ED, Patient presented with T101.7, 111bpm, 120/54, RR 22@92% NC   WBC 12, h/h 13.7/41.2, plt 184  INR 2.82  lactate 3.1  Na 140, K 3.6, BUN 25, Cr 1.4, glucose 180, GFR 35  CXR showed shallow inspiration, atherosclerotic aorta. Nonspecific chronic bibasilar interstitial prominence similar to prior. No focal consolidation or pleural effusion.  EKG showed sinus rhythm   Patient was started on IVF  bolus, duoneb Q6, 1x vanco, 1x zosyn, 1x solumedrol IV, and is NPO

## 2018-01-25 NOTE — ED ADULT NURSE NOTE - PMH
COPD (chronic obstructive pulmonary disease)    DVT (deep venous thrombosis)    Emphysema    HLD (hyperlipidemia)    HTN (hypertension)    Insulin dependent diabetes mellitus

## 2018-01-25 NOTE — H&P ADULT - PROBLEM SELECTOR PLAN 3
- IV lasix   - strict I&Os  - daily weight  - last echo 10/2017 showed grossly preserved LV function  - cardio consult - IV lasix   - strict I&Os  - daily weight  - last echo 10/2017 showed grossly preserved LV function  - cardio consult  - f/u probnp - IV lasix   - strict I&Os  - daily weight  - last echo 10/2017 showed grossly preserved LV function  - cardio consult Dr. Lam   - f/u probnp - strict I&Os  - daily weight  - last echo 10/2017 showed grossly preserved LV function  - cardio consult Dr. Lam   - hold lasix for now, patient does not appear fluid overloaded  - f/u probnp

## 2018-01-25 NOTE — CONSULT NOTE ADULT - SUBJECTIVE AND OBJECTIVE BOX
RADHA GRAVES [facility]    ALLERGY nka   CONTACT Son Jose [phone]   REASON FOR VISIT   Initial evaluation/Pulmonary consultation requested  1/25/2018 by Dr Kowalski from Dr Kramer   Patient examined chart reviewed  HOSPITAL ADMISSION [facility] Dr Lamin Melton 1/25/2018   PATIENT CAME  FROM (if information available)    PAST MEDICAL & SURGICAL HISTORY:  Insulin dependent diabetes mellitus  HLD (hyperlipidemia)  HTN (hypertension)  COPD (chronic obstructive pulmonary disease)  Emphysema  DVT (deep venous thrombosis)  No significant past surgical history  VITALS/LABS  1/25/2018 W 12 Hb 13.7 Plt 184    PATIENT DESCRIPTION CC HPI PMH SOCIAL 1/25/2018 ADMISSION [facility]   81 y.o. 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, ho HTN, HLD, IDDM  hosp [facility] 7/23-7/28/2016 with ac bronchitis   readmitted [facility] 9/26-10/11/2016 with hemoptysis (while on coumadin) SOB    Had coumadin coagulopathy on arrival Rxed with [facility] Went into septic or cardiac shock 9/29 intubated required levophed and  9/29 Poss Takosubo CMPTHY Rxed Dobutamine Readmitted 10/4-10/10/2017 with SOB  AC RESP FAILURE (HYPOXEMIC) Managed with NIV Weaned RESP TRACT BACTERIAL INFECTION Excluded Neg pct Neg RVP  DYSPNEA Likely sec COPD Ex v ADHF although CXR showed lung fields to be clear VTE unlikely as INR was 4 on arrival COPD EX Managed with BD steroids   HOME MEDS Duoneb norvasc 5 asa 81lipitor 20 pulmicort lasix 20 sl scale Lantus 35 levoxyl 50 metoprolol 50.2 protonix 40 warfarin 1   HPI 1/25/2018 ADMISSION [facility]   Presented with SOB, worsening b/l LE edema despite self medicating with increased doses of lasix . HO sick grandchild contact   1/25/2018 In ER Febrile to 101.7 W 12 LA 3.1 Cr 1.4  CXR showed hronic bibasilar interstitial prominence similar to prior   1/25/2018 In ER started on Vanco zosyn     HPI DETAILS 1/25/2018   Patient was present with her family at bedside. Stated that her worsening SOB started today morning despite NC and nebulizing treatment. Patient also noted that her b/l LE worsened in the last few days, son increased her lasix to 80mg daily for over a week without improvement. Son also noted that despite increase in lasix dose, he noticed that patient has decrease in urine output. Patient stated that she has trouble walking in the past few days due to increase b/l LE edema and pain. Normally patient able to ambulate from bed to bathroom before becoming SOB. Patient stated that her SOB has improved since coming to the ED, however, her LE pain and edema has not resolved. Also endorse in worsening dry cough over the past few days with very little sputum production. Patient's son stated that patient had cough a few weeks ago and was evaluated by home care service and was started on an antibiotic. Stated that her granddaughter has been sick at home. Patient also noted 2 episodes of diarrhea today morning. Denies any chest pain, dizziness, headache, nausea, vomiting, abdominal pain, hematuria, melena, or hematochezia.   In the ED, Patient presented with T101.7, 111bpm, 120/54, RR 22@92% NC   WBC 12, h/h 13.7/41.2, plt 184  INR 2.82  lactate 3.1  Na 140, K 3.6, BUN 25, Cr 1.4, glucose 180, GFR 35  CXR showed shallow inspiration, atherosclerotic aorta. Nonspecific chronic bibasilar interstitial prominence similar to prior. No focal consolidation or pleural effusion.  EKG showed sinus rhythm   Patient was started on IVF  bolus, duoneb Q6, 1x vanco, 1x zosyn, 1x solumedrol IV, and is NPO  HOSPITAL COURSE PROBLEM ASSESSMENT PLAN 1/25/2018 [facility]MISSION [facility]                                                           FLU  1/25 AH3 Positive   1/25 Rx with Tamiflu   POSSIBLE PNEUMONIA  1/25/2018 CXR Non sp chr bibasal IS prominence similar to 10/4/2017 azithro (1/25) rocephin (1/25)   1/25/2018 Will check pct and c results to determine if there is bacterial superinfection   LACTICEMIA SEC SEPSIS V d CHF  1/25 LA 3.1   1/25/2018 NS 2.5 l ordered (1/25 12p)   1/25 Monitor serially  RECURRENT VTE  1/25    Warfarin (1/25)   COPD   Duoneb.4 (1/25) Solumed 40.3 (1/25)   CAD  Atorvastat 20 (1/25)   CHF  10/6/2017 ECHO   EF 55 RVSP 52   HYPOTHYROIDISM   Levoxyl 50 (1/25)   GLOBAL ISSUE/BEST PRACTICE:        PROBLEM: Analgesia:     na                        PROBLEM: Sedation:     na               PROBLEM: HOB elevation:   y             PROBLEM: Stress ulcer proph:    protonix 40 (1/25)                       PROBLEM: VTE prophylaxis:      na                  PROBLEM: Glycemic control:    iss (1/25)    PROBLEM: Nutrition:    consistent carb MOODY (1/25)           PROBLEM: Advanced directive: na     PROBLEM: Allergies:  na      TIME SPENT Over 25 minutes aggregate care time spent on encounter; activities included   direct patient care, counseling and/or coordinating care reviewing notes, lab data/ imaging , discussion with multidisciplinary team/ patient  /family. Risks, benefits, alternatives  discussed in detail. Proper antibiotic use issues addressed Questions/concerns  were addressed  as  best as possible   PATIENT DESCRIPTION CC HPI PMH SOCIAL 1/25/2018 ADMISSION [facility]   81 y.o. 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, ho HTN, HLD, IDDM  hosp [facility] 7/23-7/28/2016 with ac bronchitis   readmitted [facility] 9/26-10/11/2016 with hemoptysis (while on coumadin) SOB    Had coumadin coagulopathy on arrival Rxed with [facility] Went into septic or cardiac shock 9/29 intubated required levophed and  9/29 Poss Takosubo CMPTHY Rxed Dobutamine Readmitted 10/4-10/10/2017 with SOB  AC RESP FAILURE (HYPOXEMIC) Managed with NIV Weaned RESP TRACT BACTERIAL INFECTION Excluded Neg pct Neg RVP  DYSPNEA Likely sec COPD Ex v ADHF although CXR showed lung fields to be clear VTE unlikely as INR was 4 on arrival COPD EX Managed with BD steroids   HOME MEDS Duoneb norvasc 5 asa 81lipitor 20 pulmicort lasix 20 sl scale Lantus 35 levoxyl 50 metoprolol 50.2 protonix 40 warfarin 1   HPI 1/25/2018 ADMISSION [facility]   Presented with SOB, worsening b/l LE edema despite self medicating with increased doses of lasix . HO sick grandchild contact   1/25/2018 In ER Febrile to 101.7 W 12 LA 3.1 Cr 1.4  CXR showed hronic bibasilar interstitial prominence similar to prior   1/25/2018 In ER started on Vanco zosyn   SUMMARY ASSESSMENT PLAN 1/25/2018  ADMISSION [facility]   FLU Tamiflu (1/25)   PNEUMONIA Azithro (1/25) Rocephin (1/25)   LACTICEMIA FC (1/25)  COPD BD CS (1/25)

## 2018-01-25 NOTE — ED PROVIDER NOTE - OBJECTIVE STATEMENT
This is an end stage copd, chf pt on home care, presents with son due to increased weakness and dyspnia, pt admits to chills and dry cough over the last few days, does get relief with at home nebulizers, bit is unable to complete normal ADLs.

## 2018-01-25 NOTE — H&P ADULT - ASSESSMENT
This is an 82 y.o. 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 Admit for sepsis likely 2/2 COPD exacerbation vs CHF exacerbation, evaluate for b/l DVT This is an 82 y.o. 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 Admit for sepsis likely 2/2 COPD exacerbation

## 2018-01-26 ENCOUNTER — APPOINTMENT (OUTPATIENT)
Dept: HOME HEALTH SERVICES | Facility: HOME HEALTH | Age: 83
End: 2018-01-26

## 2018-01-26 DIAGNOSIS — N17.9 ACUTE KIDNEY FAILURE, UNSPECIFIED: ICD-10-CM

## 2018-01-26 LAB
ALBUMIN SERPL ELPH-MCNC: 2.8 G/DL — LOW (ref 3.3–5)
ALBUMIN SERPL ELPH-MCNC: 2.9 G/DL — LOW (ref 3.3–5)
ALP SERPL-CCNC: 68 U/L — SIGNIFICANT CHANGE UP (ref 40–120)
ALP SERPL-CCNC: 69 U/L — SIGNIFICANT CHANGE UP (ref 40–120)
ALT FLD-CCNC: 28 U/L — SIGNIFICANT CHANGE UP (ref 12–78)
ALT FLD-CCNC: 28 U/L — SIGNIFICANT CHANGE UP (ref 12–78)
ANION GAP SERPL CALC-SCNC: 10 MMOL/L — SIGNIFICANT CHANGE UP (ref 5–17)
ANION GAP SERPL CALC-SCNC: 11 MMOL/L — SIGNIFICANT CHANGE UP (ref 5–17)
AST SERPL-CCNC: 27 U/L — SIGNIFICANT CHANGE UP (ref 15–37)
AST SERPL-CCNC: 32 U/L — SIGNIFICANT CHANGE UP (ref 15–37)
BASE EXCESS BLDV CALC-SCNC: 1.1 MMOL/L — SIGNIFICANT CHANGE UP (ref -2–2)
BASOPHILS # BLD AUTO: 0 K/UL — SIGNIFICANT CHANGE UP (ref 0–0.2)
BASOPHILS NFR BLD AUTO: 0.3 % — SIGNIFICANT CHANGE UP (ref 0–2)
BILIRUB SERPL-MCNC: 0.4 MG/DL — SIGNIFICANT CHANGE UP (ref 0.2–1.2)
BILIRUB SERPL-MCNC: 0.5 MG/DL — SIGNIFICANT CHANGE UP (ref 0.2–1.2)
BLOOD GAS COMMENTS, VENOUS: SIGNIFICANT CHANGE UP
BUN SERPL-MCNC: 26 MG/DL — HIGH (ref 7–23)
BUN SERPL-MCNC: 28 MG/DL — HIGH (ref 7–23)
CALCIUM SERPL-MCNC: 7.8 MG/DL — LOW (ref 8.5–10.1)
CALCIUM SERPL-MCNC: 7.9 MG/DL — LOW (ref 8.5–10.1)
CHLORIDE SERPL-SCNC: 103 MMOL/L — SIGNIFICANT CHANGE UP (ref 96–108)
CHLORIDE SERPL-SCNC: 105 MMOL/L — SIGNIFICANT CHANGE UP (ref 96–108)
CO2 SERPL-SCNC: 26 MMOL/L — SIGNIFICANT CHANGE UP (ref 22–31)
CO2 SERPL-SCNC: 27 MMOL/L — SIGNIFICANT CHANGE UP (ref 22–31)
CREAT SERPL-MCNC: 1.2 MG/DL — SIGNIFICANT CHANGE UP (ref 0.5–1.3)
CREAT SERPL-MCNC: 1.7 MG/DL — HIGH (ref 0.5–1.3)
EOSINOPHIL # BLD AUTO: 0 K/UL — SIGNIFICANT CHANGE UP (ref 0–0.5)
EOSINOPHIL NFR BLD AUTO: 0 % — SIGNIFICANT CHANGE UP (ref 0–6)
GLUCOSE SERPL-MCNC: 316 MG/DL — HIGH (ref 70–99)
GLUCOSE SERPL-MCNC: 355 MG/DL — HIGH (ref 70–99)
HBA1C BLD-MCNC: 8 % — HIGH (ref 4–5.6)
HCO3 BLDV-SCNC: 26 MMOL/L — SIGNIFICANT CHANGE UP (ref 21–29)
HCT VFR BLD CALC: 35.6 % — SIGNIFICANT CHANGE UP (ref 34.5–45)
HGB BLD-MCNC: 11.1 G/DL — LOW (ref 11.5–15.5)
HOROWITZ INDEX BLDV+IHG-RTO: 21 — SIGNIFICANT CHANGE UP
INR BLD: 1.8 RATIO — HIGH (ref 0.88–1.16)
LACTATE SERPL-SCNC: 2.2 MMOL/L — HIGH (ref 0.7–2)
LACTATE SERPL-SCNC: 5.1 MMOL/L — CRITICAL HIGH (ref 0.7–2)
LACTATE SERPL-SCNC: 6.5 MMOL/L — CRITICAL HIGH (ref 0.7–2)
LACTATE SERPL-SCNC: 7.2 MMOL/L — CRITICAL HIGH (ref 0.7–2)
LYMPHOCYTES # BLD AUTO: 0.5 K/UL — LOW (ref 1–3.3)
LYMPHOCYTES # BLD AUTO: 8.3 % — LOW (ref 13–44)
MAGNESIUM SERPL-MCNC: 2 MG/DL — SIGNIFICANT CHANGE UP (ref 1.6–2.6)
MCHC RBC-ENTMCNC: 27.9 PG — SIGNIFICANT CHANGE UP (ref 27–34)
MCHC RBC-ENTMCNC: 31.2 GM/DL — LOW (ref 32–36)
MCV RBC AUTO: 89.5 FL — SIGNIFICANT CHANGE UP (ref 80–100)
MONOCYTES # BLD AUTO: 0.2 K/UL — SIGNIFICANT CHANGE UP (ref 0–0.9)
MONOCYTES NFR BLD AUTO: 2.4 % — SIGNIFICANT CHANGE UP (ref 1–9)
NEUTROPHILS # BLD AUTO: 5.6 K/UL — SIGNIFICANT CHANGE UP (ref 1.8–7.4)
NEUTROPHILS NFR BLD AUTO: 89 % — HIGH (ref 43–77)
PCO2 BLDV: 35 MMHG — SIGNIFICANT CHANGE UP (ref 35–50)
PH BLDV: 7.46 — HIGH (ref 7.35–7.45)
PHOSPHATE SERPL-MCNC: 4.1 MG/DL — SIGNIFICANT CHANGE UP (ref 2.5–4.5)
PLATELET # BLD AUTO: 126 K/UL — LOW (ref 150–400)
PO2 BLDV: 171 MMHG — HIGH (ref 25–45)
POTASSIUM SERPL-MCNC: 3.4 MMOL/L — LOW (ref 3.5–5.3)
POTASSIUM SERPL-MCNC: 4.3 MMOL/L — SIGNIFICANT CHANGE UP (ref 3.5–5.3)
POTASSIUM SERPL-SCNC: 3.4 MMOL/L — LOW (ref 3.5–5.3)
POTASSIUM SERPL-SCNC: 4.3 MMOL/L — SIGNIFICANT CHANGE UP (ref 3.5–5.3)
PROCALCITONIN SERPL-MCNC: 0.06 NG/ML — HIGH (ref 0–0.04)
PROT SERPL-MCNC: 6.1 G/DL — SIGNIFICANT CHANGE UP (ref 6–8.3)
PROT SERPL-MCNC: 6.2 G/DL — SIGNIFICANT CHANGE UP (ref 6–8.3)
PROTHROM AB SERPL-ACNC: 19.9 SEC — HIGH (ref 9.8–12.7)
RBC # BLD: 3.98 M/UL — SIGNIFICANT CHANGE UP (ref 3.8–5.2)
RBC # FLD: 14.9 % — HIGH (ref 10.3–14.5)
SAO2 % BLDV: 100 % — HIGH (ref 67–88)
SODIUM SERPL-SCNC: 141 MMOL/L — SIGNIFICANT CHANGE UP (ref 135–145)
SODIUM SERPL-SCNC: 141 MMOL/L — SIGNIFICANT CHANGE UP (ref 135–145)
TROPONIN I SERPL-MCNC: 0.14 NG/ML — HIGH (ref 0.01–0.04)
WBC # BLD: 6.3 K/UL — SIGNIFICANT CHANGE UP (ref 3.8–10.5)
WBC # FLD AUTO: 6.3 K/UL — SIGNIFICANT CHANGE UP (ref 3.8–10.5)

## 2018-01-26 PROCEDURE — 99233 SBSQ HOSP IP/OBS HIGH 50: CPT

## 2018-01-26 PROCEDURE — 99223 1ST HOSP IP/OBS HIGH 75: CPT

## 2018-01-26 RX ORDER — INSULIN GLARGINE 100 [IU]/ML
10 INJECTION, SOLUTION SUBCUTANEOUS AT BEDTIME
Qty: 0 | Refills: 0 | Status: DISCONTINUED | OUTPATIENT
Start: 2018-01-26 | End: 2018-01-30

## 2018-01-26 RX ORDER — POTASSIUM CHLORIDE 20 MEQ
20 PACKET (EA) ORAL ONCE
Qty: 0 | Refills: 0 | Status: COMPLETED | OUTPATIENT
Start: 2018-01-26 | End: 2018-01-26

## 2018-01-26 RX ORDER — ALPRAZOLAM 0.25 MG
0.25 TABLET ORAL ONCE
Qty: 0 | Refills: 0 | Status: DISCONTINUED | OUTPATIENT
Start: 2018-01-26 | End: 2018-01-26

## 2018-01-26 RX ORDER — WARFARIN SODIUM 2.5 MG/1
2.5 TABLET ORAL ONCE
Qty: 0 | Refills: 0 | Status: COMPLETED | OUTPATIENT
Start: 2018-01-26 | End: 2018-01-26

## 2018-01-26 RX ORDER — INSULIN LISPRO 100/ML
VIAL (ML) SUBCUTANEOUS
Qty: 0 | Refills: 0 | Status: DISCONTINUED | OUTPATIENT
Start: 2018-01-26 | End: 2018-01-30

## 2018-01-26 RX ORDER — IPRATROPIUM/ALBUTEROL SULFATE 18-103MCG
3 AEROSOL WITH ADAPTER (GRAM) INHALATION EVERY 4 HOURS
Qty: 0 | Refills: 0 | Status: DISCONTINUED | OUTPATIENT
Start: 2018-01-26 | End: 2018-01-30

## 2018-01-26 RX ORDER — SODIUM CHLORIDE 9 MG/ML
1000 INJECTION INTRAMUSCULAR; INTRAVENOUS; SUBCUTANEOUS
Qty: 0 | Refills: 0 | Status: DISCONTINUED | OUTPATIENT
Start: 2018-01-26 | End: 2018-01-27

## 2018-01-26 RX ORDER — SODIUM CHLORIDE 9 MG/ML
500 INJECTION INTRAMUSCULAR; INTRAVENOUS; SUBCUTANEOUS ONCE
Qty: 0 | Refills: 0 | Status: COMPLETED | OUTPATIENT
Start: 2018-01-26 | End: 2018-01-26

## 2018-01-26 RX ADMIN — Medication 0.5 MILLIGRAM(S): at 06:53

## 2018-01-26 RX ADMIN — INSULIN GLARGINE 10 UNIT(S): 100 INJECTION, SOLUTION SUBCUTANEOUS at 22:34

## 2018-01-26 RX ADMIN — Medication 3 MILLILITER(S): at 13:14

## 2018-01-26 RX ADMIN — Medication 40 MILLIGRAM(S): at 00:48

## 2018-01-26 RX ADMIN — Medication 3 MILLILITER(S): at 06:53

## 2018-01-26 RX ADMIN — WARFARIN SODIUM 2.5 MILLIGRAM(S): 2.5 TABLET ORAL at 21:44

## 2018-01-26 RX ADMIN — Medication 0.5 MILLIGRAM(S): at 19:47

## 2018-01-26 RX ADMIN — Medication 10: at 17:25

## 2018-01-26 RX ADMIN — SODIUM CHLORIDE 250 MILLILITER(S): 9 INJECTION INTRAMUSCULAR; INTRAVENOUS; SUBCUTANEOUS at 14:09

## 2018-01-26 RX ADMIN — Medication 4: at 09:12

## 2018-01-26 RX ADMIN — Medication 4: at 11:19

## 2018-01-26 RX ADMIN — PANTOPRAZOLE SODIUM 40 MILLIGRAM(S): 20 TABLET, DELAYED RELEASE ORAL at 07:22

## 2018-01-26 RX ADMIN — Medication 0.25 MILLIGRAM(S): at 18:37

## 2018-01-26 RX ADMIN — Medication 40 MILLIGRAM(S): at 07:22

## 2018-01-26 RX ADMIN — Medication 3 MILLILITER(S): at 16:20

## 2018-01-26 RX ADMIN — Medication 40 MILLIGRAM(S): at 21:44

## 2018-01-26 RX ADMIN — Medication 3 MILLILITER(S): at 19:47

## 2018-01-26 RX ADMIN — Medication 40 MILLIGRAM(S): at 11:19

## 2018-01-26 RX ADMIN — Medication 50 MICROGRAM(S): at 07:22

## 2018-01-26 RX ADMIN — Medication 30 MILLIGRAM(S): at 07:22

## 2018-01-26 RX ADMIN — Medication 30 MILLIGRAM(S): at 17:25

## 2018-01-26 RX ADMIN — Medication 3 MILLILITER(S): at 23:50

## 2018-01-26 RX ADMIN — ATORVASTATIN CALCIUM 20 MILLIGRAM(S): 80 TABLET, FILM COATED ORAL at 21:44

## 2018-01-26 RX ADMIN — Medication 20 MILLIEQUIVALENT(S): at 11:19

## 2018-01-26 NOTE — PROGRESS NOTE ADULT - PROBLEM SELECTOR PLAN 8
- dvt ppx with coumadin   - PT eval   IMPROVE VTE Individual Risk Assessment          RISK                                                          Points    [x] Previous VTE                                                3  [  ] Thrombophilia                                             2  [  ] Lower limb paralysis                                   2        (unable to hold up >15 seconds)    [  ] Current Cancer                                            2         (within 6 months)  [x] Immobilization > 24 hrs                              1  [  ] ICU/CCU stay > 24 hours                            1  [ x] Age > 60                                                    1    IMPROVE VTE Score _5 - continue atorvastatin

## 2018-01-26 NOTE — PROGRESS NOTE ADULT - ASSESSMENT
This is an 82 y.o. 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 Admit for sepsis likely 2/2 COPD exacerbation

## 2018-01-26 NOTE — CONSULT NOTE ADULT - SUBJECTIVE AND OBJECTIVE BOX
CHIEF COMPLAINT: Patient is a 82y old  Female who presents with a chief complaint of SOB (26 Jan 2018 02:02)      HPI: 82 yo F PMH COPD (60 pack years, quit 3 years ago), recurrent DVT on coumadin, HTN, HLD, IDDM, known large AAA not considered a candidate for repair, HFPEF presented to the ED with SOB,    worsening b/l LE edema. Patient was present with her family at bedside. Stated that her worsening SOB started today morning despite NC and nebulizing treatment. Patient also noted that her b/l LE worsened in the last few days, son increased her lasix to 80mg daily for over a week without improvement. Son also noted that despite increase in lasix dose, he noticed that patient has decrease in urine output. Patient stated that she has trouble walking in the past few days due to increase b/l LE edema and pain. Normally patient able to ambulate from bed to bathroom before becoming SOB. Patient stated that her SOB has improved since coming to the ED, however, her LE pain and edema has not resolved. Also endorse in worsening dry cough over the past few days with very little sputum production. Patient's son stated that patient had cough a few weeks ago and was evaluated by home care service and was started on an antibiotic. Stated that her granddaughter has been sick at home. Patient also noted 2 episodes of diarrhea today morning. Denies any chest pain, dizziness, headache, nausea, vomiting, abdominal pain, hematuria, melena, or hematochezia.     In the ED, Patient presented with T101.7, 111bpm, 120/54, RR 22@92% NC   WBC 12, h/h 13.7/41.2, plt 184  INR 2.82  lactate 3.1  Na 140, K 3.6, BUN 25, Cr 1.4, glucose 180, GFR 35  CXR showed shallow inspiration, atherosclerotic aorta. Nonspecific chronic bibasilar interstitial prominence similar to prior. No focal consolidation or pleural effusion.  EKG showed sinus rhythm   Patient was started on IVF  bolus, duoneb Q6, 1x vanco, 1x zosyn, 1x solumedrol IV, and is NPO (25 Jan 2018 17:55)    Her lower extremity edema has improved. She still is complaining of increased wheezing and dyspnea. Feels bettter post nebs. Denies any chest pain,   PND, orthopnea, near syncope, syncope,  stroke like symptoms.       EKG: < from: 12 Lead ECG (01.25.18 @ 11:43) >  Normal sinus rhythm  Left anterior fascicular block  Nonspecific ST abnormality    < end of copied text >      REVIEW OF SYSTEMS:   All other review of systems are negative unless indicated above    PAST MEDICAL & SURGICAL HISTORY:  CHF (congestive heart failure)  Insulin dependent diabetes mellitus  HLD (hyperlipidemia)  HTN (hypertension)  COPD (chronic obstructive pulmonary disease)  Emphysema  DVT (deep venous thrombosis)  No significant past surgical history      SOCIAL HISTORY:  No tobacco, ethanol, or drug abuse.    FAMILY HISTORY:  No pertinent family history in first degree relatives    No family history of acute MI or sudden cardiac death.    MEDICATIONS  (STANDING):  ALBUTerol/ipratropium for Nebulization 3 milliLiter(s) Nebulizer every 6 hours  atorvastatin 20 milliGRAM(s) Oral at bedtime  buDESOnide   0.5 milliGRAM(s) Respule 0.5 milliGRAM(s) Inhalation two times a day  dextrose 5%. 1000 milliLiter(s) (50 mL/Hr) IV Continuous <Continuous>  dextrose 50% Injectable 12.5 Gram(s) IV Push once  dextrose 50% Injectable 25 Gram(s) IV Push once  dextrose 50% Injectable 25 Gram(s) IV Push once  insulin lispro (HumaLOG) corrective regimen sliding scale   SubCutaneous three times a day before meals  levothyroxine 50 MICROGram(s) Oral daily  methylPREDNISolone sodium succinate Injectable 40 milliGRAM(s) IV Push every 8 hours  oseltamivir 30 milliGRAM(s) Oral every 12 hours  pantoprazole    Tablet 40 milliGRAM(s) Oral before breakfast  warfarin 2.5 milliGRAM(s) Oral once    MEDICATIONS  (PRN):  dextrose Gel 1 Dose(s) Oral once PRN Blood Glucose LESS THAN 70 milliGRAM(s)/deciliter  glucagon  Injectable 1 milliGRAM(s) IntraMuscular once PRN Glucose LESS THAN 70 milligrams/deciliter      Allergies    No Known Allergies    Intolerances        Home meds:  Home Medications:  amLODIPine 5 mg oral tablet: 1 tab(s) orally once a day (25 Jan 2018 18:31)  aspirin 81 mg oral tablet, chewable: 1 tab(s) orally once a day (25 Jan 2018 18:31)  atorvastatin 20 mg oral tablet: 1 tab(s) orally once a day (at bedtime) (25 Jan 2018 18:31)  ipratropium-albuterol 0.5 mg-2.5 mg/3 mLinhalation solution: 1 unit(s) inhaled every 4 hours, As Needed (25 Jan 2018 18:31)  Lantus Solostar Pen 100 units/mL subcutaneous solution: 10 unit(s) subcutaneous once a day (at bedtime) (25 Jan 2018 18:31)  levothyroxine 50 mcg (0.05 mg) oral tablet: 1 tab(s) orally once a day (25 Jan 2018 18:31)  metoprolol tartrate 50 mg oral tablet: 1 tab(s) orally 2 times a day (25 Jan 2018 18:31)  pantoprazole 40 mg oral delayed release tablet: 1 tab(s) orally once a day (before a meal) (25 Jan 2018 18:31)  potassium chloride 10 mEq oral capsule, extended release: 1 cap(s) orally once a day (25 Jan 2018 18:31)  predniSONE 10 mg oral tablet: 1 tab(s) orally 2 times a day (25 Jan 2018 18:31)  Pulmicort Flexhaler 90 mcg/inh inhalation powder: 2 puff(s) inhaled 2 times a day (25 Jan 2018 18:31)  warfarin 2.5 mg oral tablet: 1 tab(s) orally once a day Sunday, Monday, Tuesday, Wednesday, Thursday  (25 Jan 2018 18:31)  warfarin 2.5 mg oral tablet: 2 tab(s) orally once a day, Friday, Saturday (25 Jan 2018 18:31)  Xanax 0.25 mg oral tablet: 1 tab(s) orally 2 times a day (25 Jan 2018 18:31)        VITAL SIGNS:   Vital Signs Last 24 Hrs  T(C): 36.4 (26 Jan 2018 08:19), Max: 38.7 (25 Jan 2018 12:09)  T(F): 97.5 (26 Jan 2018 08:19), Max: 101.7 (25 Jan 2018 12:09)  HR: 88 (26 Jan 2018 08:19) (76 - 111)  BP: 153/86 (26 Jan 2018 08:19) (108/53 - 153/86)  BP(mean): --  RR: 18 (26 Jan 2018 08:19) (18 - 24)  SpO2: 94% (26 Jan 2018 08:19) (92% - 98%)    I&O's Summary    25 Jan 2018 07:01  -  26 Jan 2018 07:00  --------------------------------------------------------  IN: 0 mL / OUT: 0 mL / NET: 0 mL        On Exam:     Constitutional: NAD, awake and alert,  HEENT: Moist Mucous Membranes, Anicteric  Pulmonary: Decreased breath sounds b/l. scattered wheeze and rhonchi  Cardiovascular: Regular, S1 and S2, distant   Gastrointestinal: Bowel Sounds present, soft, nontender.   Lymph: 1-2 + lower ext edema  Skin: cellulitic changes b/l lower ext  Psych:  Mood & affect appropriate    LABS: All Labs Reviewed:                        11.1   6.3   )-----------( 126      ( 26 Jan 2018 06:46 )             35.6                         13.7   12.0  )-----------( 184      ( 25 Jan 2018 15:08 )             41.2     26 Jan 2018 06:46    141    |  105    |  26     ----------------------------<  316    3.4     |  26     |  1.20   26 Jan 2018 00:04    141    |  103    |  28     ----------------------------<  355    4.3     |  27     |  1.70   25 Jan 2018 15:54    SEE NOTE  |  SEE NOTE  |  SEE NOTE  ----------------------------<  SEE NOTE  See Note   |  See Note  |  SEE NOTE    Ca    7.9        26 Jan 2018 06:46  Ca    7.8        26 Jan 2018 00:04  Ca    SEE NOTE      25 Jan 2018 15:54  Phos  4.1       26 Jan 2018 06:46  Mg     2.0       26 Jan 2018 06:46    TPro  6.2    /  Alb  2.8    /  TBili  0.4    /  DBili  x      /  AST  32     /  ALT  28     /  AlkPhos  69     26 Jan 2018 06:46  TPro  6.1    /  Alb  2.9    /  TBili  0.5    /  DBili  x      /  AST  27     /  ALT  28     /  AlkPhos  68     26 Jan 2018 00:04  TPro  SEE NOTE  /  Alb  SEE NOTE  /  TBili  SEE NOTE  /  DBili  x      /  AST  SEE NOTE  /  ALT  SEE NOTE  /  AlkPhos  See Note  25 Jan 2018 15:54    PT/INR - ( 26 Jan 2018 06:46 )   PT: 19.9 sec;   INR: 1.80 ratio         PTT - ( 25 Jan 2018 15:08 )  PTT:35.8 sec  CARDIAC MARKERS ( 26 Jan 2018 06:46 )  .144 ng/mL / x     / x     / x     / x      CARDIAC MARKERS ( 26 Jan 2018 00:04 )  .144 ng/mL / x     / x     / x     / x          Blood Culture:         RADIOLOGY:  < from: CT Chest No Cont (01.25.18 @ 21:27) >    EXAM:  CT CHEST                            PROCEDURE DATE:  01/25/2018          INTERPRETATION:  CT CHEST WITHOUT CONTRAST    INDICATION: Sepsis. History of COPD and CHF.    TECHNIQUE: Noncontrast CT imaging of the thorax.     COMPARISON: CT angiogram chest 9/26/2016.    FINDINGS:    Lines and tubes: None.  Airways: Tracheobronchial tree is patent.  Lungs and Pleura: No pneumonia, pulmonary edema, or dominant masses.   There is mild bibasilar segmental atelectasis. No pleural effusion or   pneumothorax.    Mediastinum and lymph nodes: No mass or hemorrhage. No worrisome   mediastinal adenopathy. No worrisome hilar or axillary adenopathy.  Heart: Normal size. No pericardial effusion.  Vessels: Severe atherosclerotic disease of the aorta and its branches.    Upper Abdomen: No suspicious abnormalities of the visualized portions of   the liver, spleen, adrenal glands, or kidneys. Partially visualized juxta   renal abdominal aortic aneurysm measuring up to a maximum of 6.5 x 6.2   cm, incompletely visualized.    Bones and soft tissues: No suspicious lesions.    IMPRESSION:    Bilateral subsegmental atelectasis. No convincing evidence of either   pulmonary edema or acute pneumonia.    Partially visualized juxtarenal abdominal aortic aneurysm measuring up to   a maximum of 6.5 x 6.2 cm, incompletely visualized.                LONNIE STEWART M.D., ATTENDING RADIOLOGIST  This document has been electronically signed. Jan 25 2018  9:51PM                < end of copied text >      < from: TTE Echo Doppler w/o Cont (10.06.17 @ 11:39) >     EXAM:  ECHO TTE W/O CON COMP W/DOPPLR         PROCEDURE DATE:  10/06/2017        INTERPRETATION:  Ordering Physician: MARANDA ELDER [id number]    Indication: Dyspnea    Study Quality: Technically difficult   A complete echocardiographic study was performed utilizing standard   protocol including spectral and color Doppler in all echocardiographic   windows.    Height:  149 cm    Weight: 76-kg  BSA: 1.7  Blood Pressure: 138/68    MEASUREMENTS  IVS: 1.4cm  PWT: 1.1cm  LA: 4.3cm  AO: 3.4cm  LVIDd: 4.2cm  LVIDs: 2.8cm    LVEF: 55-60%  RVSP: 52 mmHg    FINDINGS  Left Ventricle: Mild left ventricular concentric hypertrophy. Grossly   normal left ventricular cavity size and systolic function. No segmental   wall motion abnormalities  Aortic Valve: Calcified aortic valve with decreased opening  Mitral Valve: Mitral annular calcification. In some views, there appears   to be decreased mobility of the posterior mitral valve leaflet. Mild   mitral regurgitation  Tricuspid Valve: Not well-visualized. Mild to moderate tricuspid   regurgitation  Pulmonic Valve: Not well visualized. Mild pulmonic regurgitation  Left Atrium: Left atrial enlargement  Right Ventricle: Not well visualized. Grossly normal right ventricular   size and function  Right Atrium: Grossly normal  Diastolic Function: There is evidence of diastolic dysfunction  Pericardium/Pleura: Predominant anterior fat pad. No pericardial effusion  Hemodynamics: The pulmonary artery systolic pressure is estimated to be   52 mmHg, assuming the right atrial pressure is 5 mmHg, consistent with   elevated pulmonary pressures    CONCLUSIONS:  1. Technically difficult study  2. Mild left ventricular concentric hypertrophy  3. Left atrial enlargement  4. Grossly preserved left ventricular systolic function  5. Mitral annular calcification. In some views, the posterior mitral   valve leaflet appears calcified with decreased mobility. Mild mitral   regurgitation. No gradient suggestive of mitral stenosis.  6. Grossly normal right ventricular size and function  7. The pulmonary artery systolic pressure is estimated to be 52 mmHg,   assuming the right atrial pressure is 5 mmHg, consistent with mild to   moderate elevation of pulmonary pressures  8.Calcified aortic valve with decreased opening  9. No pericardial effusion    No prior exam for comparison                      STACI MATIAS   This document has been electronically signed. Oct  6 2017  2:28PM                < end of copied text >

## 2018-01-26 NOTE — PROGRESS NOTE ADULT - PROBLEM SELECTOR PLAN 1
Sepsis ruled out.  she has no PNA on CT chest.  AH3+. c/w tamiflu, tylenol  - f/u RVP   - f/u blood culture  - f/u urine culture  - f/u urinalysis   - IVF held due to fluid overload  - f/u lactate  - f/u AM labs  - f/u procalcitonin  - f/u CT chest  - f/u urine legionella and strep antigen  - f/u bladder scan Sepsis ruled out.  she has no PNA on CT chest.  AH3+. c/w tamiflu, tylenol

## 2018-01-26 NOTE — PROGRESS NOTE ADULT - PROBLEM SELECTOR PLAN 4
- b/l LE edema with pain on palpation   - LE doppler neg  - continue coumadin   - f/u daily INR Imrbetteng , hold lasix for now.

## 2018-01-26 NOTE — PROGRESS NOTE ADULT - PROBLEM SELECTOR PLAN 3
- strict I&Os  - daily weight  - last echo 10/2017 showed grossly preserved LV function  - cardio consult Dr. Lam   - may need lasix for leg edema - strict I&Os, - daily weight  - last echo 10/2017 showed grossly preserved LV function  - cardio consult Dr. Lam   - may need lasix for leg edema

## 2018-01-26 NOTE — PROGRESS NOTE ADULT - PROBLEM SELECTOR PLAN 6
- BP controlled with out home BP medications for now  - will restart once BP increases - ISS  - routine finger stick  - consistent carb diet once NPO is discontinued

## 2018-01-26 NOTE — CONSULT NOTE ADULT - ASSESSMENT
80 yo F PMH COPD (60 pack years, quit 3 years ago), recurrent DVT on coumadin, HTN, HLD, IDDM, known large AAA not considered a candidate for repair, HFPEF presented to the ED with SOB likely from COPD exacerbation and Flu  - I think sx are likely from flu and COPD exacerbation. I would cont pulm RX  - Appears compensated from HF POV. Though still with lower ext edema that is chronic. Given her recent SAJAN, avoid diuretics today. Monitor Cr. Likely start on Lasix PO in 24-48 hours  - Monitor and replete electrolytes. Keep K>4.0 and Mg>2.0.   - BP is mildly elevated. I would resume home BB  - AAA appears to be expanding. May want to have it reassessed by vascular.   - Trop elevation likely in setting of SAJAN. Not c/w plaque rupture. Cont ASA and statin.   - Dose Coumadin. Goal INR 2-3.   - Monitor tele  - Further cardiac workup will depend on clinical course.   - All other workup per primary team. Will followup.

## 2018-01-26 NOTE — CHART NOTE - NSCHARTNOTEFT_GEN_A_CORE
Called by RN for lactate of 5.1 on patient.  Patient currently in COPD exacerbation, with CHF.  IVF being held for fluid overload.    No changes in management at this time.  F/u Lactate in 3 hours  Dr. Hernandez notified, and agrees with plan

## 2018-01-26 NOTE — PROGRESS NOTE ADULT - PROBLEM SELECTOR PLAN 5
- ISS  - routine finger stick  - consistent carb diet once NPO is discontinued - b/l LE edema with pain on palpation   - LE doppler neg  - continue coumadin   - f/u daily INR

## 2018-01-26 NOTE — PROGRESS NOTE ADULT - SUBJECTIVE AND OBJECTIVE BOX
Patient is a 82y old  Female who presents with a chief complaint of SOB (26 Jan 2018 02:02)      INTERVAL HPI: Pt seen and examined bedside, c/o SOB and cough. denies any CP, palpitation.   OVERNIGHT EVENTS:  T(F): 97.5 (01-26-18 @ 08:19), Max: 101.7 (01-25-18 @ 12:09)  HR: 88 (01-26-18 @ 08:19) (76 - 111)  BP: 153/86 (01-26-18 @ 08:19) (108/53 - 153/86)  RR: 18 (01-26-18 @ 08:19) (18 - 24)  SpO2: 94% (01-26-18 @ 08:19) (92% - 98%)  Wt(kg): --  I&O's Summary    25 Jan 2018 07:01  -  26 Jan 2018 07:00  --------------------------------------------------------  IN: 0 mL / OUT: 0 mL / NET: 0 mL        REVIEW OF SYSTEM:    Constitutional: No fever, chills, fatigue  Neuro: No headache, numbness, weakness  Resp: +cough, wheezing, and shortness of breath  CVS: No chest pain, palpitations, +leg swelling and pain  GI: No abdominal pain, nausea, vomiting, diarrhea   : No dysuria, frequency, incontinence  Skin: No itching, burning, rashes, or lesions   Msk: No joint pain or swelling  Psych: No depression, anxiety, mood swings          PHYSICAL EXAM:  GENERAL: NAD, well-developed  HEAD:  Atraumatic, Normocephalic  EYES: EOMI, PERRLA, conjunctiva and sclera clear  ENMT: No tonsillar erythema, exudates, or enlargement; Moist mucous membranes,   NECK: Supple, No JVD, Normal thyroid  HEART: Regular rate and rhythm; No murmurs, rubs, or gallops  RESPIRATORY: diminished BS B/L, +wheezing, no rhonchi  ABDOMEN: Soft, Nontender, Nondistended; Bowel sounds present  NEUROLOGY: A&Ox3, nonfocal, moving all extremities.  EXTREMITIES:  2+ Peripheral Pulses, 2+ edema le  SKIN: warm, dry, normal color, no rash or abnormal lesions        LABS:                        11.1   6.3   )-----------( 126      ( 26 Jan 2018 06:46 )             35.6     01-26    141  |  105  |  26<H>  ----------------------------<  316<H>  3.4<L>   |  26  |  1.20    Ca    7.9<L>      26 Jan 2018 06:46  Phos  4.1     01-26  Mg     2.0     01-26    TPro  6.2  /  Alb  2.8<L>  /  TBili  0.4  /  DBili  x   /  AST  32  /  ALT  28  /  AlkPhos  69  01-26    PT/INR - ( 26 Jan 2018 06:46 )   PT: 19.9 sec;   INR: 1.80 ratio         PTT - ( 25 Jan 2018 15:08 )  PTT:35.8 sec    CAPILLARY BLOOD GLUCOSE      POCT Blood Glucose.: 303 mg/dL (26 Jan 2018 08:03)              MEDICATIONS  (STANDING):  ALBUTerol/ipratropium for Nebulization 3 milliLiter(s) Nebulizer every 6 hours  atorvastatin 20 milliGRAM(s) Oral at bedtime  buDESOnide   0.5 milliGRAM(s) Respule 0.5 milliGRAM(s) Inhalation two times a day  dextrose 5%. 1000 milliLiter(s) (50 mL/Hr) IV Continuous <Continuous>  dextrose 50% Injectable 12.5 Gram(s) IV Push once  dextrose 50% Injectable 25 Gram(s) IV Push once  dextrose 50% Injectable 25 Gram(s) IV Push once  insulin lispro (HumaLOG) corrective regimen sliding scale   SubCutaneous three times a day before meals  levothyroxine 50 MICROGram(s) Oral daily  methylPREDNISolone sodium succinate Injectable 40 milliGRAM(s) IV Push every 8 hours  oseltamivir 30 milliGRAM(s) Oral every 12 hours  pantoprazole    Tablet 40 milliGRAM(s) Oral before breakfast    MEDICATIONS  (PRN):  dextrose Gel 1 Dose(s) Oral once PRN Blood Glucose LESS THAN 70 milliGRAM(s)/deciliter  glucagon  Injectable 1 milliGRAM(s) IntraMuscular once PRN Glucose LESS THAN 70 milligrams/deciliter

## 2018-01-26 NOTE — PROGRESS NOTE ADULT - PROBLEM SELECTOR PLAN 7
- continue atorvastatin - BP controlled with out home BP medications for now  - will restart once BP increases

## 2018-01-26 NOTE — PROGRESS NOTE ADULT - PROBLEM SELECTOR PLAN 2
- continue duoneb Q6   - continue supplemental O2  - taper on Po steroids.  iv zithromax , ceftriaxone   - f/u pulm consult Dr. Kramer - continue duoneb Q6   - continue supplemental O2  - taper on Po steroids.  -d/c zithromax , ceftriaxone   - f/u pulm consult Dr. Kramer

## 2018-01-27 DIAGNOSIS — D72.829 ELEVATED WHITE BLOOD CELL COUNT, UNSPECIFIED: ICD-10-CM

## 2018-01-27 DIAGNOSIS — R79.89 OTHER SPECIFIED ABNORMAL FINDINGS OF BLOOD CHEMISTRY: ICD-10-CM

## 2018-01-27 DIAGNOSIS — J10.1 INFLUENZA DUE TO OTHER IDENTIFIED INFLUENZA VIRUS WITH OTHER RESPIRATORY MANIFESTATIONS: ICD-10-CM

## 2018-01-27 LAB
-  CANDIDA ALBICANS: SIGNIFICANT CHANGE UP
-  CANDIDA GLABRATA: SIGNIFICANT CHANGE UP
-  CANDIDA KRUSEI: SIGNIFICANT CHANGE UP
-  CANDIDA PARAPSILOSIS: SIGNIFICANT CHANGE UP
-  CANDIDA TROPICALIS: SIGNIFICANT CHANGE UP
-  COAGULASE NEGATIVE STAPHYLOCOCCUS: SIGNIFICANT CHANGE UP
-  K. PNEUMONIAE GROUP: SIGNIFICANT CHANGE UP
-  KPC RESISTANCE GENE: SIGNIFICANT CHANGE UP
-  STREPTOCOCCUS SP. (NOT GRP A, B OR S PNEUMONIAE): SIGNIFICANT CHANGE UP
A BAUMANNII DNA SPEC QL NAA+PROBE: SIGNIFICANT CHANGE UP
BASE EXCESS BLDA CALC-SCNC: -3.9 MMOL/L — LOW (ref -2–2)
BLOOD GAS COMMENTS ARTERIAL: SIGNIFICANT CHANGE UP
CULTURE RESULTS: SIGNIFICANT CHANGE UP
E CLOAC COMP DNA BLD POS QL NAA+PROBE: SIGNIFICANT CHANGE UP
E COLI DNA BLD POS QL NAA+NON-PROBE: SIGNIFICANT CHANGE UP
ENTEROCOC DNA BLD POS QL NAA+NON-PROBE: SIGNIFICANT CHANGE UP
ENTEROCOC DNA BLD POS QL NAA+NON-PROBE: SIGNIFICANT CHANGE UP
GP B STREP DNA BLD POS QL NAA+NON-PROBE: SIGNIFICANT CHANGE UP
GRAM STN FLD: SIGNIFICANT CHANGE UP
HAEM INFLU DNA BLD POS QL NAA+NON-PROBE: SIGNIFICANT CHANGE UP
HCO3 BLDA-SCNC: 22 MMOL/L — LOW (ref 23–27)
HCT VFR BLD CALC: 35.6 % — SIGNIFICANT CHANGE UP (ref 34.5–45)
HGB BLD-MCNC: 11.2 G/DL — LOW (ref 11.5–15.5)
HOROWITZ INDEX BLDA+IHG-RTO: 40 — SIGNIFICANT CHANGE UP
INR BLD: 1.9 RATIO — HIGH (ref 0.88–1.16)
K OXYTOCA DNA BLD POS QL NAA+NON-PROBE: SIGNIFICANT CHANGE UP
L MONOCYTOG DNA BLD POS QL NAA+NON-PROBE: SIGNIFICANT CHANGE UP
LACTATE SERPL-SCNC: 6.9 MMOL/L — CRITICAL HIGH (ref 0.7–2)
LACTATE SERPL-SCNC: 7.8 MMOL/L — CRITICAL HIGH (ref 0.7–2)
MCHC RBC-ENTMCNC: 29 PG — SIGNIFICANT CHANGE UP (ref 27–34)
MCHC RBC-ENTMCNC: 31.6 GM/DL — LOW (ref 32–36)
MCV RBC AUTO: 91.8 FL — SIGNIFICANT CHANGE UP (ref 80–100)
METHOD TYPE: SIGNIFICANT CHANGE UP
MRSA SPEC QL CULT: SIGNIFICANT CHANGE UP
MSSA DNA SPEC QL NAA+PROBE: SIGNIFICANT CHANGE UP
N MEN ISLT CULT: SIGNIFICANT CHANGE UP
ORGANISM # SPEC MICROSCOPIC CNT: SIGNIFICANT CHANGE UP
ORGANISM # SPEC MICROSCOPIC CNT: SIGNIFICANT CHANGE UP
P AERUGINOSA DNA BLD POS NAA+NON-PROBE: SIGNIFICANT CHANGE UP
PCO2 BLDA: 31 MMHG — LOW (ref 32–46)
PH BLDA: 7.42 — SIGNIFICANT CHANGE UP (ref 7.35–7.45)
PLATELET # BLD AUTO: 149 K/UL — LOW (ref 150–400)
PO2 BLDA: 69 MMHG — LOW (ref 74–108)
PROTEUS SP DNA BLD POS QL NAA+NON-PROBE: SIGNIFICANT CHANGE UP
PROTHROM AB SERPL-ACNC: 21 SEC — HIGH (ref 9.8–12.7)
RBC # BLD: 3.88 M/UL — SIGNIFICANT CHANGE UP (ref 3.8–5.2)
RBC # FLD: 15.5 % — HIGH (ref 10.3–14.5)
S MARCESCENS DNA BLD POS NAA+NON-PROBE: SIGNIFICANT CHANGE UP
S PNEUM AG SER QL: SIGNIFICANT CHANGE UP
S PNEUM DNA BLD POS QL NAA+NON-PROBE: SIGNIFICANT CHANGE UP
S PYO DNA BLD POS QL NAA+NON-PROBE: SIGNIFICANT CHANGE UP
SAO2 % BLDA: 94 % — SIGNIFICANT CHANGE UP (ref 92–96)
SPECIMEN SOURCE: SIGNIFICANT CHANGE UP
WBC # BLD: 12.1 K/UL — HIGH (ref 3.8–10.5)
WBC # FLD AUTO: 12.1 K/UL — HIGH (ref 3.8–10.5)

## 2018-01-27 PROCEDURE — 71045 X-RAY EXAM CHEST 1 VIEW: CPT | Mod: 26

## 2018-01-27 PROCEDURE — 99233 SBSQ HOSP IP/OBS HIGH 50: CPT

## 2018-01-27 RX ORDER — FUROSEMIDE 40 MG
40 TABLET ORAL ONCE
Qty: 0 | Refills: 0 | Status: COMPLETED | OUTPATIENT
Start: 2018-01-27 | End: 2018-01-27

## 2018-01-27 RX ORDER — METOPROLOL TARTRATE 50 MG
50 TABLET ORAL
Qty: 0 | Refills: 0 | Status: DISCONTINUED | OUTPATIENT
Start: 2018-01-27 | End: 2018-01-30

## 2018-01-27 RX ORDER — ALPRAZOLAM 0.25 MG
0.25 TABLET ORAL ONCE
Qty: 0 | Refills: 0 | Status: DISCONTINUED | OUTPATIENT
Start: 2018-01-27 | End: 2018-01-27

## 2018-01-27 RX ORDER — FUROSEMIDE 40 MG
40 TABLET ORAL DAILY
Qty: 0 | Refills: 0 | Status: DISCONTINUED | OUTPATIENT
Start: 2018-01-27 | End: 2018-01-30

## 2018-01-27 RX ORDER — WARFARIN SODIUM 2.5 MG/1
2.5 TABLET ORAL ONCE
Qty: 0 | Refills: 0 | Status: COMPLETED | OUTPATIENT
Start: 2018-01-27 | End: 2018-01-27

## 2018-01-27 RX ADMIN — Medication 0.25 MILLIGRAM(S): at 22:36

## 2018-01-27 RX ADMIN — Medication 40 MILLIGRAM(S): at 22:21

## 2018-01-27 RX ADMIN — Medication 0.5 MILLIGRAM(S): at 07:37

## 2018-01-27 RX ADMIN — ATORVASTATIN CALCIUM 20 MILLIGRAM(S): 80 TABLET, FILM COATED ORAL at 22:21

## 2018-01-27 RX ADMIN — Medication 3 MILLILITER(S): at 13:02

## 2018-01-27 RX ADMIN — Medication 3 MILLILITER(S): at 19:58

## 2018-01-27 RX ADMIN — Medication 10: at 17:00

## 2018-01-27 RX ADMIN — Medication 30 MILLIGRAM(S): at 05:31

## 2018-01-27 RX ADMIN — Medication 10: at 11:49

## 2018-01-27 RX ADMIN — Medication 50 MICROGRAM(S): at 05:31

## 2018-01-27 RX ADMIN — Medication 3 MILLILITER(S): at 16:45

## 2018-01-27 RX ADMIN — WARFARIN SODIUM 2.5 MILLIGRAM(S): 2.5 TABLET ORAL at 22:20

## 2018-01-27 RX ADMIN — PANTOPRAZOLE SODIUM 40 MILLIGRAM(S): 20 TABLET, DELAYED RELEASE ORAL at 05:31

## 2018-01-27 RX ADMIN — Medication 0.5 MILLIGRAM(S): at 19:58

## 2018-01-27 RX ADMIN — Medication 3 MILLILITER(S): at 05:30

## 2018-01-27 RX ADMIN — Medication 50 MILLIGRAM(S): at 17:04

## 2018-01-27 RX ADMIN — INSULIN GLARGINE 10 UNIT(S): 100 INJECTION, SOLUTION SUBCUTANEOUS at 22:21

## 2018-01-27 RX ADMIN — Medication 8: at 07:58

## 2018-01-27 RX ADMIN — Medication 3 MILLILITER(S): at 07:34

## 2018-01-27 RX ADMIN — Medication 40 MILLIGRAM(S): at 14:02

## 2018-01-27 RX ADMIN — Medication 40 MILLIGRAM(S): at 05:31

## 2018-01-27 RX ADMIN — Medication 30 MILLIGRAM(S): at 17:04

## 2018-01-27 NOTE — PROGRESS NOTE ADULT - PROBLEM SELECTOR PLAN 3
- strict I&Os, - daily weight  - last echo 10/2017 showed grossly preserved LV function  leg edema 2+,   will hold off fluids for now

## 2018-01-27 NOTE — PHYSICAL THERAPY INITIAL EVALUATION ADULT - PERTINENT HX OF CURRENT PROBLEM, REHAB EVAL
Presents to the ED with SOB, worsening b/l LE edema. Patient was present with her family at bedside. Stated that her worsening SOB started today morning despite NC and nebulizing treatment. Patient also noted that her b/l LE worsened, son increased her lasix to 80mg daily for over a week without improvement.

## 2018-01-27 NOTE — PROGRESS NOTE ADULT - PROBLEM SELECTOR PLAN 1
Sepsis ruled out.  she has no PNA on CT chest.  AH3+. c/w tamiflu, tylenol  1 bc postive from gram postive cocci in clusters aerobic/anaerebolic, likely contaiminaiton  afebrile,   pt does have elevated lacattate suspect it from acute bronchitis with hyperactive airway disease secondary to influenza will trend

## 2018-01-27 NOTE — CHART NOTE - NSCHARTNOTEFT_GEN_A_CORE
Called by RN to assess patient as fluids were on for patient however after reviewing progress notes from the day, she noticed that cardiology wanted to D/c fluids.  Pt seen and examined at bedside, no complaints, currently breathing with face mask and not in any respiratory distress.        T(C): 36.7 (01-27-18 @ 20:02), Max: 37.1 (01-27-18 @ 00:14)  HR: 107 (01-27-18 @ 20:02) (82 - 125)  BP: 122/78 (01-27-18 @ 20:02) (122/78 - 168/92)  RR: 21 (01-27-18 @ 20:02) (20 - 22)  SpO2: 96% (01-27-18 @ 20:02) (90% - 97%)    Physical Exam:  General: obese female, NAD  HEENT: NCAT, PERRLA, EOMI bl, moist mucous membranes   Neck: Supple, nontender, no mass  Respiratory: + crackles in left base, no wheezing, no coughing  CV: RRR, +S1/S2, no murmurs, rubs or gallops  Abdominal: Soft, NT, ND +BSx4  Extremities: 2+ b/l LE edema, + peripheral pulses  MSK: Normal ROM, no joint erythema or warmth, no joint swelling   Skin: warm, dry, normal color, no rash or abnormal lesions      A/P:  This is an 82 y.o. 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 Admit for sepsis likely 2/2 COPD exacerbation.   - Patient's 40 mg oral lasix today was not given, seems fluid overloaded, therefore will order now.  -D/C IVF  -0.25 Xanax oral added one time dose for anxiety (patient takes at home)

## 2018-01-27 NOTE — PROGRESS NOTE ADULT - SUBJECTIVE AND OBJECTIVE BOX
VITALS/LABS  1/27/2018 afeb 107 120/70 21 96%   1/27/2018 NS 50 (1/26)   1/27/2018 W 12.1 Hb 11.2 Plt 149  Na 143 K 3.7 CO2 26 Cr 1.2   REVIEW OF SYMPTOMS    Able to give ROS  Yes     RELIABLE No   CONSTITUTIONAL Weakness Yes  Chills No Vision changes No  ENDOCRINE No unexplained hair loss No heat or cold intolerance    ALLERGY No hives  Sore throat No   RESP Coughing blood no  Shortness of breath YES   NEURO No Headache  Confusion Pain neck No   CARDIAC No Chest pain No Palpitations   GI No Pain abdomen NO   Vomiting NO   PHYSICAL EXAM    HEENT Unremarkable PERRLA atraumatic   RESP Fair air entry EXP prolonged    Harsh breath sound Resp distres mild   CARDIAC S1 S2 No S3     NO JVD    ABDOMEN SOFT BS PRESENT NOT DISTENDED No hepatosplenomegaly PEDAL EDEMA present No calf tenderness  NO rash   GENERAL Not TOXIC looking  HOSPITAL COURSE PROBLEM ASSESSMENT PLAN 1/25/2018 ADMISSION [facility] P        RESP   1/26 vbg pH 746         1/27/2018 6p 5l 742/31/69                                                FLU  1/25 AH3 Positive   1/25 Rx with Tamiflu   POSSIBLE PNEUMONIA  1/25/2018 CXR Non sp chr bibasal IS prominence similar to 10/4/2017 azithro (1/25) rocephin (1/25)   1/25/2018 Will check pct and c results to determine if there is bacterial superinfection  1/25 CT ch CW 9/26/2016 No pneumonia pulm edema or dominant masses Mild basl ssa juxtarenal aaa 6.5 x 6.2 cm   1/26 pct .06  1/26 Suggest id eval as she has high la to help decide re abio DC decision given the low pct    LACTICEMIA SEC SEPSIS V d CHF  1/25-1/26-1/26-1/26-1/27 LA 3.1 -5.1-2.2-6.5-7.8   1/25/2018 NS 2.5 l ordered (1/25 12p)   1/25 Monitor serially  1/27 Etiology of LA not very clear   RECURRENT VTE  1/25    Warfarin (1/25)     COPD   Duoneb.4 (1/25) Solumed 40.3 (1/25) pulmicort (1/25)   CAD  Atorvastat 20 (1/25) Metoprolol 50.2 (1/27)   1/26 Tr 1 .144 (i)   CHF  10/6/2017 ECHO   EF 55 RVSP 52   Lasix 40 (1/27)   HYPOTHYROIDISM   Levoxyl 50 (1/25)   AAA  1/26 CT ch justarenal 6.5x6.2  AAA  1/26 Consider vasc eval surg  DM   Lantus 10 (1/26) iss (1/26)   GLOBAL ISSUE/BEST PRACTICE:        PROBLEM: Analgesia:     na                        PROBLEM: Sedation:     na               PROBLEM: HOB elevation:   y             PROBLEM: Stress ulcer proph:    protonix 40 (1/25)                       PROBLEM: VTE prophylaxis:      na                  PROBLEM: Glycemic control:    iss (1/25)    PROBLEM: Nutrition:    consistent carb MOODY (1/25)           PROBLEM: Advanced directive: na     PROBLEM: Allergies:  na      TIME SPENT Over 25 minutes aggregate care time spent on encounter; activities included   direct patient care, counseling and/or coordinating care reviewing notes, lab data/ imaging , discussion with multidisciplinary team/ patient  /family. Risks, benefits, alternatives  discussed in detail. Proper antibiotic use issues addressed Questions/concerns  were addressed  as  best as possible   PATIENT DESCRIPTION CC HPI PM SOCIAL 1/25/2018 ADMISSION [facility] P   81 y.o. F former 1 ppd smoker quit 2015 retd [place] with [facility]  recurrent DVT once  in her 50s and again in her 60s on life-long coumadin, ho HTN, HLD, IDDM  hosp [facility] P 7/23-7/28/2016 with ac bronchitis   readmitted [facility] P 9/26-10/11/2016 with hemoptysis (while on coumadin) SOB    Had coumadin coagulopathy on arrival Rxed with [facility] Went into septic or cardiac shock 9/29 intubated required levophed and  9/29 Poss Takosubo CMPTHY Rxed Dobutamine Readmitted 10/4-10/10/2017 with SOB  AC RESP FAILURE (HYPOXEMIC) Managed with NIV Weaned RESP TRACT BACTERIAL INFECTION Excluded Neg pct Neg RVP  DYSPNEA Likely sec COPD Ex v ADHF although CXR showed lung fields to be clear VTE unlikely as INR was 4 on arrival COPD EX Managed with BD steroids   HOME MEDS Duoneb norvasc 5 asa 81lipitor 20 pulmicort lasix 20 sl scale Lantus 35 levoxyl 50 metoprolol 50.2 protonix 40 warfarin 1     HPI 1/25/2018 ADMISSION [facility] P   Presented with SOB, worsening b/l LE edema despite self medicating with increased doses of lasix . HO sick grandchild contact   1/25/2018 In ER Febrile to 101.7 W 12 LA 3.1 Cr 1.4  CXR showed hronic bibasilar interstitial prominence similar to prior   1/25/2018 In ER started on Vanco zosyn   SUMMARY ASSESSMENT PLAN 1/25/2018  ADMISSION [facility] P   FLU Tamiflu (1/25)   PNEUMONIA 1/26 pct .06Azithro (1/25-1/26) Rocephin (1/25-1/26)   LACTICEMIA FC (1/25) Optimization of COPD and CHF   COPD BD CS (1/25)

## 2018-01-27 NOTE — PROGRESS NOTE ADULT - ASSESSMENT
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 I would cont pulm RX  - Pt dyspneic on exam rales b/l lower bases with lower ext edema that is chronic with new redness noted b/l.  SAJAN has improved IVF stopped although her lactate remains elevated.  Will start her oral dose of Lasix 40mg PO daily and would monitor her CR and volume status closely.    - Monitor strict I&Os and daily weights.    - Tele revealed SR - ST 80s to 106 with episodes of AT to 120. Will restart Metoprolol 50mg bid for better rate control. Cont tele.   - BP trending 119-170 will hold hold home dose of Norvasc 5mg for now and monitor BP on Metoprolol and Lasix.  If remains elevated would restart.    - AAA appears to be expanding. May want to have it reassessed by vascular.   - Trop elevation likely in setting of SAJAN. Not c/w plaque rupture. Cont ASA and statin.   - Dose Coumadin. Goal INR 2-3. INR 1.9 today slightly subtherapeutic.   - Monitor and replete electrolytes. Keep K>4.0 and Mg>2.0.   - Further cardiac workup will depend on clinical course.   - All other workup per primary team. Will followup.     Rosalva Brennan NP-C  Cardiology

## 2018-01-27 NOTE — PROGRESS NOTE ADULT - PROBLEM SELECTOR PLAN 2
- continue duoneb Q6   - continue supplemental O2  - taper steroids.  -d/c zithromax , ceftriaxone   - f/u pulm consult Dr. Kramer

## 2018-01-27 NOTE — PROGRESS NOTE ADULT - SUBJECTIVE AND OBJECTIVE BOX
Patient is a 82y old  Female who presents with a chief complaint of SOB (26 Jan 2018 02:02)        HPI:  This is an 82 y.o. 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 Patient was present with her family at bedside. Stated that her worsening SOB started today morning despite NC and nebulizing treatment. Patient also noted that her b/l LE worsened in the last few days, son increased her lasix to 80mg daily for over a week without improvement. Son also noted that despite increase in lasix dose, he noticed that patient has decrease in urine output. Patient stated that she has trouble walking in the past few days due to increase b/l LE edema and pain. Normally patient able to ambulate from bed to bathroom before becoming SOB. Patient stated that her SOB has improved since coming to the ED, however, her LE pain and edema has not resolved. Also endorse in worsening dry cough over the past few days with very little sputum production. Patient's son stated that patient had cough a few weeks ago and was evaluated by home care service and was started on an antibiotic. Stated that her granddaughter has been sick at home. Patient also noted 2 episodes of diarrhea today morning. Denies any chest pain, dizziness, headache, nausea, vomiting, abdominal pain, hematuria, melena, or hematochezia.     In the ED, Patient presented with T101.7, 111bpm, 120/54, RR 22@92% NC   WBC 12, h/h 13.7/41.2, plt 184  INR 2.82  lactate 3.1  Na 140, K 3.6, BUN 25, Cr 1.4, glucose 180, GFR 35  CXR showed shallow inspiration, atherosclerotic aorta. Nonspecific chronic bibasilar interstitial prominence similar to prior. No focal consolidation or pleural effusion.  EKG showed sinus rhythm   Patient was started on IVF  bolus, duoneb Q6, 1x vanco, 1x zosyn, 1x solumedrol IV, and is NPO (25 Jan 2018 17:55)      SUBJECTIVE & OBJECTIVE: Pt seen and examined at bedside. states feeeling better but at times has difficulty in breathing     PHYSICAL EXAM:  T(C): 36.6 (01-27-18 @ 08:20), Max: 37.1 (01-27-18 @ 00:14)  HR: 95 (01-27-18 @ 08:20) (82 - 705)  BP: 155/93 (01-27-18 @ 08:20) (119/78 - 170/78)  RR: 20 (01-27-18 @ 08:20) (20 - 22)  SpO2: 93% (01-27-18 @ 08:20) (92% - 96%)  Wt(kg): --   GENERAL: NAD, well-groomed, well-developed  HEAD:  Atraumatic, Normocephalic  EYES: EOMI, PERRLA, conjunctiva and sclera clear  ENMT: Moist mucous membranes  NECK: Supple, No JVD  NERVOUS SYSTEM:  Alert & Oriented X3,  CHEST/LUNG: mild wheezing and ornchi   HEART: Regular rate and rhythm; No murmurs, rubs, or gallops  ABDOMEN: Soft, Nontender, Nondistended; Bowel sounds present  EXTREMITIES:  2+ pedal edema       MEDICATIONS  (STANDING):  ALBUTerol/ipratropium for Nebulization 3 milliLiter(s) Nebulizer every 4 hours  atorvastatin 20 milliGRAM(s) Oral at bedtime  buDESOnide   0.5 milliGRAM(s) Respule 0.5 milliGRAM(s) Inhalation two times a day  dextrose 5%. 1000 milliLiter(s) (50 mL/Hr) IV Continuous <Continuous>  dextrose 50% Injectable 12.5 Gram(s) IV Push once  dextrose 50% Injectable 25 Gram(s) IV Push once  dextrose 50% Injectable 25 Gram(s) IV Push once  insulin glargine Injectable (LANTUS) 10 Unit(s) SubCutaneous at bedtime  insulin lispro (HumaLOG) corrective regimen sliding scale   SubCutaneous three times a day before meals  levothyroxine 50 MICROGram(s) Oral daily  methylPREDNISolone sodium succinate Injectable 40 milliGRAM(s) IV Push every 8 hours  oseltamivir 30 milliGRAM(s) Oral every 12 hours  pantoprazole    Tablet 40 milliGRAM(s) Oral before breakfast  sodium chloride 0.9%. 1000 milliLiter(s) (50 mL/Hr) IV Continuous <Continuous>    MEDICATIONS  (PRN):  dextrose Gel 1 Dose(s) Oral once PRN Blood Glucose LESS THAN 70 milliGRAM(s)/deciliter  glucagon  Injectable 1 milliGRAM(s) IntraMuscular once PRN Glucose LESS THAN 70 milligrams/deciliter      LABS:                        11.2   12.1  )-----------( 149      ( 27 Jan 2018 06:23 )             35.6     01-27    143  |  107  |  29<H>  ----------------------------<  348<H>  3.7   |  26  |  1.20    Ca    8.5      27 Jan 2018 07:54  Phos  4.1     01-26  Mg     2.0     01-26    TPro  6.2  /  Alb  2.8<L>  /  TBili  0.4  /  DBili  x   /  AST  32  /  ALT  28  /  AlkPhos  69  01-26    PT/INR - ( 27 Jan 2018 06:23 )   PT: 21.0 sec;   INR: 1.90 ratio         PTT - ( 25 Jan 2018 15:08 )  PTT:35.8 sec      CAPILLARY BLOOD GLUCOSE      POCT Blood Glucose.: 346 mg/dL (27 Jan 2018 07:56)  POCT Blood Glucose.: 318 mg/dL (26 Jan 2018 22:19)  POCT Blood Glucose.: 394 mg/dL (26 Jan 2018 16:52)  POCT Blood Glucose.: 339 mg/dL (26 Jan 2018 11:01)      CAPILLARY BLOOD GLUCOSE      POCT Blood Glucose.: 346 mg/dL (27 Jan 2018 07:56)  POCT Blood Glucose.: 318 mg/dL (26 Jan 2018 22:19)  POCT Blood Glucose.: 394 mg/dL (26 Jan 2018 16:52)  POCT Blood Glucose.: 339 mg/dL (26 Jan 2018 11:01)    CAPILLARY BLOOD GLUCOSE      POCT Blood Glucose.: 346 mg/dL (27 Jan 2018 07:56)      CARDIAC MARKERS ( 26 Jan 2018 06:46 )  .144 ng/mL / x     / x     / x     / x      CARDIAC MARKERS ( 26 Jan 2018 00:04 )  .144 ng/mL / x     / x     / x     / x            RECENT CULTURES:      RADIOLOGY & ADDITIONAL TESTS:                        DVT/GI ppx  Discussed with pt @ bedside

## 2018-01-27 NOTE — CONSULT NOTE ADULT - SUBJECTIVE AND OBJECTIVE BOX
[facility], Division of Infectious Diseases  DAVID Fitzgerald A. Lee  [phone]  ELY GARCIA  82y, Female  [id number]    HPI:  This is an 82 y.o. 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 Patient was present with her family at bedside. Stated that her worsening SOB started today morning despite NC and nebulizing treatment. Patient also noted that her b/l LE worsened in the last few days, son increased her lasix to 80mg daily for over a week without improvement. Son also noted that despite increase in lasix dose, he noticed that patient has decrease in urine output. Patient stated that she has trouble walking in the past few days due to increase b/l LE edema and pain. Normally patient able to ambulate from bed to bathroom before becoming SOB.    Patient's son stated that patient had cough a few weeks ago and was evaluated by home care service and was started on an antibiotic. Stated that her granddaughter has been sick at home. Patient also noted 2 episodes of diarrhea today has resolved.      States that has an aide that comes to shower her, who called her that she has the flu.    In the ED, Patient presented with T101.7,    WBC 12, lactate 3.1      PMH/PSH--  CHF (congestive heart failure)  Insulin dependent diabetes mellitus  HLD (hyperlipidemia)  HTN (hypertension)  COPD (chronic obstructive pulmonary disease)  Emphysema  DVT (deep venous thrombosis)  No significant past surgical history      Allergies--NKDA      Medications--  Antibiotics: oseltamivir 30 milliGRAM(s) Oral every 12 hours    Immunologic:   Other: ALBUTerol/ipratropium for Nebulization  atorvastatin  buDESOnide   0.5 milliGRAM(s) Respule  dextrose 5%.  dextrose 50% Injectable  dextrose 50% Injectable  dextrose 50% Injectable  dextrose Gel PRN  glucagon  Injectable PRN  insulin glargine Injectable (LANTUS)  insulin lispro (HumaLOG) corrective regimen sliding scale  levothyroxine  methylPREDNISolone sodium succinate Injectable  pantoprazole    Tablet  sodium chloride 0.9%.  warfarin      Social History--  EtOH: denies ***  Tobacco*former  lives with son    amily/Marital History--  family history of acute myocardial infarction      Remainder not relevant to clinical concern.    Travel/Environmental/Occupational History:  nc    Review of Systems:  A >=10-point review of systems was obtained.     Pertinent positives and negatives--  Constitutional: No fevers. No Chills. No Rigors.   Eyes: no blurry vision  ENMT: no sore throat, ++ rhinorrhea  Cardiovascular: No chest pain. No palpitations.  Respiratory: ++ shortness of breath. No cough.  Gastrointestinal: No nausea or vomiting. ++diarrhea   Genitourinary: no dysuria  Musculoskeletal: + weakness  Skin: no rash, lower ext redness  Neurologic: no headache  Psychiatric: no depression    Review of systems otherwise negative except as previously noted.    Physical Exam--  Vital Signs: T(F): 97.5 (01-27-18 @ 12:00), Max: 98.7 (01-27-18 @ 00:14)  HR: 98 (01-27-18 @ 12:00)  BP: 125/80 (01-27-18 @ 12:00)  RR: 20 (01-27-18 @ 12:00)  SpO2: 95% (01-27-18 @ 12:00)  Wt(kg): --  General: Nontoxic-appearing Female in no acute distress.  HEENT: AT/NC. . Anicteric. Conjunctiva pink and moist. Oropharynx clear. Dentition fair.  Neck: Not rigid. No sense of mass.  Nodes: None palpable.  Lungs: decrease bs  Heart: Regular rate and rhythm. No Murmur. No rub. No gallop. No palpable thrill.  Abdomen: Bowel sounds present and normoactive. Soft. Nondistended. Nontender.   Extremities: No cyanosis or clubbing. +++ edema. erythema chronic, nontender, not warm  Skin: Warm. Dry. Good turgor. No rash. No vasculitic stigmata.  Psychiatric: Appropriate affect and mood for situation.         Laboratory & Imaging Data--  CBC                        11.2   12.1  )-----------( 149      ( 27 Jan 2018 06:23 )             35.6       Chemistries  01-27    143  |  107  |  29<H>  ----------------------------<  348<H>  3.7   |  26  |  1.20    Ca    8.5      27 Jan 2018 07:54  Phos  4.1     01-26  Mg     2.0     01-26    TPro  6.2  /  Alb  2.8<L>  /  TBili  0.4  /  DBili  x   /  AST  32  /  ALT  28  /  AlkPhos  69  01-26      Culture Data    Culture - Blood (01.25.18 @ 21:26)    Specimen Source: .Blood Blood-Peripheral    Culture Results:   No growth to date.    Culture - Blood (01.25.18 @ 21:26)    Gram Stain:   Growth in aerobic bottle: Gram Positive Cocci in Clusters    -  Multidrug (KPC pos) resistant organism: Nondet    -  Staphylococcus aureus: Nondet    -  Methicillin resistant Staphylococcus aureus (MRSA): Nondet    -  Coagulase negative Staphylococcus: Nondet    -  Enterococcus species: Nondet    -  Vancomycin resistant Enterococcus sp.: Nondet    -  Escherichia coli: Nondet    -  Klebsiella oxytoca: Nondet    -  Klebsiella pneumoniae: Nondet    -  Serratia marcescens: Nondet    -  Proteus species: Nondet    -  Haemophilus influenzae: Nondet    -  Listeria monocytogenes: Nondet    -  Neisseria meningitidis: Nondet    -  Pseudomonas aeruginosa: Nondet    -  Acinetobacter baumanii: Nondet    -  Enterobacter cloacae complex: Nondet    -  Streptococcus sp. (Not Grp A, B or S pneumoniae): Nondet    -  Streptococcus agalactiae (Group B): Nondet    -  Streptococcus pyogenes (Group A): Nondet    -  Streptococcus pneumoniae: Nondet    -  Candida albicans: Nondet    -  Candida glabrata: Nondet    -  Candida krusei: Nondet    -  Candida parapsilosis: Nondet    -  Candida tropicalis: Nondet    Specimen Source: .Blood Blood-Peripheral    Organism: Blood Culture PCR    Culture Results:   Growth in aerobic bottle: Gram Positive Cocci in Clusters  ***Blood Panel PCR results on this specimen are available  approximately 3 hours after the Gram stain result.***  Gram stain, PCR, and/or culture results may not always  correspond due to difference in methodologies.  ************************************************************  This PCR assay was performed using [employer].  The following targets are tested for: Enterococcus,  vancomycin resistant enterococci, Listeria monocytogenes,  coagulase negative staphylococci, S. aureus,  methicillin resistant S. aureus, Streptococcus agalactiae  (Group B), S. pneumoniae, S. pyogenes (Group A),  Acinetobacter baumannii, Enterobacter cloacae, E. coli,  Klebsiella oxytoca, K. pneumoniae, Proteus sp.,    Serratia marcescens, Haemophilus influenzae,  Neisseria meningitidis, Pseudomonas aeruginosa, Candida  albicans, C. glabrata, C krusei, C parapsilosis,  C. tropicalis and the KPC resistance gene.    Organism Identification: Blood Culture PCR    Method Type: PCR    < from: CT Chest No Cont (01.25.18 @ 21:27) >  EXAM:  CT CHEST                            PROCEDURE DATE:  01/25/2018          INTERPRETATION:  CT CHEST WITHOUT CONTRAST    INDICATION: Sepsis. History of COPD and CHF.    TECHNIQUE: Noncontrast CT imaging of the thorax.     COMPARISON: CT angiogram chest 9/26/2016.    FINDINGS:    Lines and tubes: None.  Airways: Tracheobronchial tree is patent.  Lungs and Pleura: No pneumonia, pulmonary edema, or dominant masses.   There is mild bibasilar segmental atelectasis. No pleural effusion or   pneumothorax.    Mediastinum and lymph nodes: No mass or hemorrhage. No worrisome   mediastinal adenopathy. No worrisome hilar or axillary adenopathy.  Heart: Normal size. No pericardial effusion.  Vessels: Severe atherosclerotic disease of the aorta and its branches.    Upper Abdomen: No suspicious abnormalities of the visualized portions of   the liver, spleen, adrenal glands, or kidneys. Partially visualized juxta   renal abdominal aortic aneurysm measuring up to a maximum of 6.5 x 6.2   cm, incompletely visualized.    Bones and soft tissues: No suspicious lesions.    IMPRESSION:    Bilateral subsegmental atelectasis. No convincing evidence of either   pulmonary edema or acute pneumonia.    Partially visualized juxtarenal abdominal aortic aneurysm measuring up to   a maximum of 6.5 x 6.2 cm, incompletely visualized.    < end of copied text >      Rapid Respiratory Viral Panel (01.25.18 @ 17:27)    Rapid RVP Result: Detected: The [employer] RVP Rapid uses polymerase chain reaction (PCR) and melt  curve analysis to screen for adenovirus; coronavirus HKU1, NL63, 229E,  OC43; human metapneumovirus (hMPV); human enterovirus/rhinovirus  (Entero/RV); influenza A; influenza A/H1;influenza A/H3; influenza  A/H1-2009; influenza B; parainfluenza viruses 1, 2, 3, 4; respiratory  syncytial virus; Bordetella pertussis; Mycoplasma pneumoniae; and  Chlamydophila pneumoniae.    Influenza AH3 (RapRVP): Detected

## 2018-01-27 NOTE — PROGRESS NOTE ADULT - SUBJECTIVE AND OBJECTIVE BOX
HPI:  This is an 82 y.o. 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 Patient was present with her family at bedside. Stated that her worsening SOB started today morning despite NC and nebulizing treatment. Patient also noted that her b/l LE worsened in the last few days, son increased her lasix to 80mg daily for over a week without improvement. Son also noted that despite increase in lasix dose, he noticed that patient has decrease in urine output. Patient stated that she has trouble walking in the past few days due to increase b/l LE edema and pain. Normally patient able to ambulate from bed to bathroom before becoming SOB. Patient stated that her SOB has improved since coming to the ED, however, her LE pain and edema has not resolved. Also endorse in worsening dry cough over the past few days with very little sputum production. Patient's son stated that patient had cough a few weeks ago and was evaluated by home care service and was started on an antibiotic. Stated that her granddaughter has been sick at home. Patient also noted 2 episodes of diarrhea today morning. Denies any chest pain, dizziness, headache, nausea, vomiting, abdominal pain, hematuria, melena, or hematochezia.     In the ED, Patient presented with T101.7, 111bpm, 120/54, RR 22@92% NC   WBC 12, h/h 13.7/41.2, plt 184  INR 2.82  lactate 3.1  Na 140, K 3.6, BUN 25, Cr 1.4, glucose 180, GFR 35  CXR showed shallow inspiration, atherosclerotic aorta. Nonspecific chronic bibasilar interstitial prominence similar to prior. No focal consolidation or pleural effusion.  EKG showed sinus rhythm   Patient was started on IVF  bolus, duoneb Q6, 1x vanco, 1x zosyn, 1x solumedrol IV, and is NPO (2018 17:55)      SUBJECTIVE:  Patient is a 82y old  Female who presents with a chief complaint of SOB (2018 02:02)          OBJECTIVE:  Review Of Systems:  Constitutional: [ ] Fever [ ] Chills [ ] Fatigue [ ] Weight change   HEENT: [ ] Blurred vision [ ] Eye Pain [ ] Headache [ ] Runny nose [ ] Sore Throat   Respiratory: [x ] Cough [ ] Wheezing [x ] Shortness of breath  Cardiovascular: [ ] Chest Pain [ ] Palpitations [ ] HIDALGO [ ] PND [ ] Orthopnea  Gastrointestinal: [ ] Abdominal Pain [ ] Diarrhea [ ] Constipation [ ] Hemorrhoids [ ] Nausea [ ] Vomiting  Genitourinary: [ ] Nocturia [ ] Dysuria [ ] Incontinence  Extremities: [ ] Swelling [ ] Joint Pain  Neurologic: [ ] Focal deficit [ ] Paresthesias [ ] Syncope  Lymphatic: [x ] Swelling [ ] Lymphadenopathy   Skin: [ ] Rash [ ] Ecchymoses [ ] Wounds [ ] Lesions [x] cellulitis b/l LE  Psychiatry: [ ] Depression [ ] Suicidal/Homicidal Ideation [ ] Anxiety [ ] Sleep Disturbances  [x ] 10 point review of systems is otherwise negative except as mentioned above            [ ]Unable to obtain    Allergy:  Allergies    No Known Allergies    Intolerances        Medications:  MEDICATIONS  (STANDING):  ALBUTerol/ipratropium for Nebulization 3 milliLiter(s) Nebulizer every 4 hours  atorvastatin 20 milliGRAM(s) Oral at bedtime  buDESOnide   0.5 milliGRAM(s) Respule 0.5 milliGRAM(s) Inhalation two times a day  dextrose 5%. 1000 milliLiter(s) (50 mL/Hr) IV Continuous <Continuous>  dextrose 50% Injectable 12.5 Gram(s) IV Push once  dextrose 50% Injectable 25 Gram(s) IV Push once  dextrose 50% Injectable 25 Gram(s) IV Push once  insulin glargine Injectable (LANTUS) 10 Unit(s) SubCutaneous at bedtime  insulin lispro (HumaLOG) corrective regimen sliding scale   SubCutaneous three times a day before meals  levothyroxine 50 MICROGram(s) Oral daily  methylPREDNISolone sodium succinate Injectable 40 milliGRAM(s) IV Push every 8 hours  oseltamivir 30 milliGRAM(s) Oral every 12 hours  pantoprazole    Tablet 40 milliGRAM(s) Oral before breakfast  sodium chloride 0.9%. 1000 milliLiter(s) (50 mL/Hr) IV Continuous <Continuous>  warfarin 2.5 milliGRAM(s) Oral once    MEDICATIONS  (PRN):  dextrose Gel 1 Dose(s) Oral once PRN Blood Glucose LESS THAN 70 milliGRAM(s)/deciliter  glucagon  Injectable 1 milliGRAM(s) IntraMuscular once PRN Glucose LESS THAN 70 milligrams/deciliter      PMH/PSH/FH/SH: [ ] Unchanged    Vitals:  T(C): 36.4 (18 @ 12:00), Max: 37.1 (18 @ 00:14)  HR: 100 (18 @ 12:50) (82 - 705)  BP: 125/80 (18 @ 12:00) (125/80 - 170/78)  BP(mean): --  RR: 20 (18 @ 12:00) (20 - 22)  SpO2: 93% (18 @ 12:50) (92% - 95%)  Wt(kg): --  Daily     Daily Weight in k.4 (2018 04:40)  I&O's Summary    2018 07:  -  2018 07:00  --------------------------------------------------------  IN: 900 mL / OUT: 0 mL / NET: 900 mL    2018 07:  -  2018 15:06  --------------------------------------------------------  IN: 570 mL / OUT: 0 mL / NET: 570 mL        Labs:                        11.2   12.1  )-----------( 149      ( 2018 06:23 )             35.6         143  |  107  |  29<H>  ----------------------------<  348<H>  3.7   |  26  |  1.20    Ca    8.5      2018 07:54  Phos  4.1       Mg     2.0         TPro  6.2  /  Alb  2.8<L>  /  TBili  0.4  /  DBili  x   /  AST  32  /  ALT  28  /  AlkPhos  69      PT/INR - ( 2018 06:23 )   PT: 21.0 sec;   INR: 1.90 ratio         PTT - ( 2018 15:08 )  PTT:35.8 sec  CARDIAC MARKERS ( 2018 06:46 )  .144 ng/mL / x     / x     / x     / x      CARDIAC MARKERS ( 2018 00:04 )  .144 ng/mL / x     / x     / x     / x          Lactate, Blood: 7.8 mmol/L ( @ 11:45)  Lactate, Blood: 6.9 mmol/L ( @ 06:23)  Lactate, Blood: 7.2 mmol/L ( @ 19:10)              ECG:  < from: 12 Lead ECG (18 @ 11:43) >  Ventricular Rate 92 BPM    Atrial Rate 92 BPM    P-R Interval 164 ms    QRS Duration 102 ms    Q-T Interval 374 ms    QTC Calculation(Bezet) 462 ms    P Axis 51 degrees    R Axis -79 degrees    T Axis 3 degrees    Diagnosis Line Normal sinus rhythm  Left anterior fascicular block  Nonspecific ST abnormality  Abnormal ECG    Confirmed by Heidi GONZALEZ, Elvis ([id number]) on 2018 8:18:41 AM    < end of copied text >    Echo:  < from: TTE Echo Doppler w/o Cont (10.06.17 @ 11:39) >     EXAM:  ECHO TTE W/O CON COMP W/DOPPLR         PROCEDURE DATE:  10/06/2017        INTERPRETATION:  Ordering Physician: MARANDA ELDER [id number]    Indication: Dyspnea    Study Quality: Technically difficult   A complete echocardiographic study was performed utilizing standard   protocol including spectral and color Doppler in all echocardiographic   windows.    Height:  149 cm    Weight: 76-kg  BSA: 1.7  Blood Pressure: 138/68    MEASUREMENTS  IVS: 1.4cm  PWT: 1.1cm  LA: 4.3cm  AO: 3.4cm  LVIDd: 4.2cm  LVIDs: 2.8cm    LVEF: 55-60%  RVSP: 52 mmHg    FINDINGS  Left Ventricle: Mild left ventricular concentric hypertrophy. Grossly   normal left ventricular cavity size and systolic function. No segmental   wall motion abnormalities  Aortic Valve: Calcified aortic valve with decreased opening  Mitral Valve: Mitral annular calcification. In some views, there appears   to be decreased mobility of the posterior mitral valve leaflet. Mild   mitral regurgitation  Tricuspid Valve: Not well-visualized. Mild to moderate tricuspid   regurgitation  Pulmonic Valve: Not well visualized. Mild pulmonic regurgitation  Left Atrium: Left atrial enlargement  Right Ventricle: Not well visualized. Grossly normal right ventricular   size and function  Right Atrium: Grossly normal  Diastolic Function: There is evidence of diastolic dysfunction  Pericardium/Pleura: Predominant anterior fat pad. No pericardial effusion  Hemodynamics: The pulmonary artery systolic pressure is estimated to be   52 mmHg, assuming the right atrial pressure is 5 mmHg, consistent with   elevated pulmonary pressures    CONCLUSIONS:  1. Technically difficult study  2. Mild left ventricular concentric hypertrophy  3. Left atrial enlargement  4. Grossly preserved left ventricular systolic function  5. Mitral annular calcification. In some views, the posterior mitral   valve leaflet appears calcified with decreased mobility. Mild mitral   regurgitation. No gradient suggestive of mitral stenosis.  6. Grossly normal right ventricular size and function  7. The pulmonary artery systolic pressure is estimated to be 52 mmHg,   assuming the right atrial pressure is 5 mmHg, consistent with mild to   moderate elevation of pulmonary pressures  8.Calcified aortic valve with decreased opening  9. No pericardial effusion    No prior exam for comparison                      STACI MATIAS   This document has been electronically signed. Oct  6 2017  2:28PM        < end of copied text >    Stress Testing:     Cath:    Imaging:  < from: CT Chest No Cont (18 @ 21:27) >  EXAM:  CT CHEST                            PROCEDURE DATE:  2018          INTERPRETATION:  CT CHEST WITHOUT CONTRAST    INDICATION: Sepsis. History of COPD and CHF.    TECHNIQUE: Noncontrast CT imaging of the thorax.     COMPARISON: CT angiogram chest 2016.    FINDINGS:    Lines and tubes: None.  Airways: Tracheobronchial tree is patent.  Lungs and Pleura: No pneumonia, pulmonary edema, or dominant masses.   There is mild bibasilar segmental atelectasis. No pleural effusion or   pneumothorax.    Mediastinum and lymph nodes: No mass or hemorrhage. No worrisome   mediastinal adenopathy. No worrisome hilar or axillary adenopathy.  Heart: Normal size. No pericardial effusion.  Vessels: Severe atherosclerotic disease of the aorta and its branches.    Upper Abdomen: No suspicious abnormalities of the visualized portions of   the liver, spleen, adrenal glands, or kidneys. Partially visualized juxta   renal abdominal aortic aneurysm measuring up to a maximum of 6.5 x 6.2   cm, incompletely visualized.    Bones and soft tissues: No suspicious lesions.    IMPRESSION:    Bilateral subsegmental atelectasis. No convincing evidence of either   pulmonary edema or acute pneumonia.    Partially visualized juxtarenal abdominal aortic aneurysm measuring up to   a maximum of 6.5 x 6.2 cm, incompletely visualized.                LONNIE STEWART M.D., ATTENDING RADIOLOGIST  This document has been electronically signed. 2018  9:51PM    < end of copied text >    Interpretation of Telemetry:  SR 80s- 90s with episodes of AT to 120s    Physical Exam:  Appearance: [ ] Normal  [ ] abnormal [x ] NAD with mild dyspnea [x] obese  Eyes: [x ] PERRL [ ] EOMI  HENT: [ x] Normal [ ] Abnormal oral muscosa [ x]NC/AT  Cardiovascular: [x ] S1 [x ] S2 [x ] RRR [ ] m/r/g [ ]edema [ ] JVP  Procedural Access Site: [ ]  hematoma [ ] tender to palpation [ ] 2+ pulse [ ] bruit [ ] Ecchymosis  Respiratory: [ ] Clear to auscultation bilaterally [x] rales b/l bases  Gastrointestinal: [ x] Soft [ ] tenderness[ ] distension [x ] BS [x] obese  Musculoskeletal: [ ] clubbing [ ] joint deformity   Neurologic: [x ] Non-focal  Lymphatic: [ ] lymphadenopathy [x] b/l LE edema non-pitting  Psychiatry: [ x] AAOx3  [ ] confused [ ] disoriented [ x] Mood & affect appropriate  Skin: [ ]  rashes [ ] ecchymoses [ ] cyanosis [x] cellulitis noted on b/l le

## 2018-01-27 NOTE — CONSULT NOTE ADULT - ASSESSMENT
82f with copd home o2, dm, htn  admitted with dyspnea, weakness, edema  found to be influenza+  trav  leukocytosis

## 2018-01-28 DIAGNOSIS — I71.4 ABDOMINAL AORTIC ANEURYSM, WITHOUT RUPTURE: ICD-10-CM

## 2018-01-28 LAB
ALBUMIN SERPL ELPH-MCNC: 2.9 G/DL — LOW (ref 3.3–5)
ALP SERPL-CCNC: 67 U/L — SIGNIFICANT CHANGE UP (ref 40–120)
ALT FLD-CCNC: 30 U/L — SIGNIFICANT CHANGE UP (ref 12–78)
ANION GAP SERPL CALC-SCNC: 10 MMOL/L — SIGNIFICANT CHANGE UP (ref 5–17)
AST SERPL-CCNC: 36 U/L — SIGNIFICANT CHANGE UP (ref 15–37)
BASE EXCESS BLDV CALC-SCNC: 2.8 MMOL/L — HIGH (ref -2–2)
BILIRUB SERPL-MCNC: 0.3 MG/DL — SIGNIFICANT CHANGE UP (ref 0.2–1.2)
BUN SERPL-MCNC: 24 MG/DL — HIGH (ref 7–23)
CALCIUM SERPL-MCNC: 8.7 MG/DL — SIGNIFICANT CHANGE UP (ref 8.5–10.1)
CHLORIDE SERPL-SCNC: 105 MMOL/L — SIGNIFICANT CHANGE UP (ref 96–108)
CK SERPL-CCNC: 206 U/L — HIGH (ref 26–192)
CO2 SERPL-SCNC: 27 MMOL/L — SIGNIFICANT CHANGE UP (ref 22–31)
CREAT SERPL-MCNC: 0.98 MG/DL — SIGNIFICANT CHANGE UP (ref 0.5–1.3)
GLUCOSE SERPL-MCNC: 288 MG/DL — HIGH (ref 70–99)
GRAM STN FLD: SIGNIFICANT CHANGE UP
HCO3 BLDV-SCNC: 26 MMOL/L — SIGNIFICANT CHANGE UP (ref 21–29)
HCT VFR BLD CALC: 35.8 % — SIGNIFICANT CHANGE UP (ref 34.5–45)
HGB BLD-MCNC: 11.4 G/DL — LOW (ref 11.5–15.5)
HOROWITZ INDEX BLDV+IHG-RTO: 21 — SIGNIFICANT CHANGE UP
INR BLD: 2.59 RATIO — HIGH (ref 0.88–1.16)
LACTATE SERPL-SCNC: 2.1 MMOL/L — HIGH (ref 0.7–2)
MAGNESIUM SERPL-MCNC: 2.1 MG/DL — SIGNIFICANT CHANGE UP (ref 1.6–2.6)
MCHC RBC-ENTMCNC: 29.2 PG — SIGNIFICANT CHANGE UP (ref 27–34)
MCHC RBC-ENTMCNC: 31.7 GM/DL — LOW (ref 32–36)
MCV RBC AUTO: 92.1 FL — SIGNIFICANT CHANGE UP (ref 80–100)
PCO2 BLDV: 49 MMHG — SIGNIFICANT CHANGE UP (ref 35–50)
PH BLDV: 7.37 — SIGNIFICANT CHANGE UP (ref 7.35–7.45)
PHOSPHATE SERPL-MCNC: 3 MG/DL — SIGNIFICANT CHANGE UP (ref 2.5–4.5)
PLATELET # BLD AUTO: 136 K/UL — LOW (ref 150–400)
PO2 BLDV: 43 MMHG — SIGNIFICANT CHANGE UP (ref 25–45)
POTASSIUM SERPL-MCNC: 3.7 MMOL/L — SIGNIFICANT CHANGE UP (ref 3.5–5.3)
POTASSIUM SERPL-SCNC: 3.7 MMOL/L — SIGNIFICANT CHANGE UP (ref 3.5–5.3)
PROT SERPL-MCNC: 6.3 G/DL — SIGNIFICANT CHANGE UP (ref 6–8.3)
PROTHROM AB SERPL-ACNC: 28.8 SEC — HIGH (ref 9.8–12.7)
RBC # BLD: 3.89 M/UL — SIGNIFICANT CHANGE UP (ref 3.8–5.2)
RBC # FLD: 15.2 % — HIGH (ref 10.3–14.5)
SAO2 % BLDV: 80 % — SIGNIFICANT CHANGE UP (ref 67–88)
SODIUM SERPL-SCNC: 142 MMOL/L — SIGNIFICANT CHANGE UP (ref 135–145)
WBC # BLD: 12 K/UL — HIGH (ref 3.8–10.5)
WBC # FLD AUTO: 12 K/UL — HIGH (ref 3.8–10.5)

## 2018-01-28 PROCEDURE — 99233 SBSQ HOSP IP/OBS HIGH 50: CPT

## 2018-01-28 RX ORDER — LOSARTAN POTASSIUM 100 MG/1
25 TABLET, FILM COATED ORAL DAILY
Qty: 0 | Refills: 0 | Status: DISCONTINUED | OUTPATIENT
Start: 2018-01-28 | End: 2018-01-30

## 2018-01-28 RX ADMIN — Medication 0.5 MILLIGRAM(S): at 20:04

## 2018-01-28 RX ADMIN — ATORVASTATIN CALCIUM 20 MILLIGRAM(S): 80 TABLET, FILM COATED ORAL at 22:14

## 2018-01-28 RX ADMIN — Medication 3 MILLILITER(S): at 08:21

## 2018-01-28 RX ADMIN — Medication 20 MILLIGRAM(S): at 22:14

## 2018-01-28 RX ADMIN — Medication 6: at 08:11

## 2018-01-28 RX ADMIN — Medication 3 MILLILITER(S): at 04:54

## 2018-01-28 RX ADMIN — Medication 30 MILLIGRAM(S): at 17:50

## 2018-01-28 RX ADMIN — PANTOPRAZOLE SODIUM 40 MILLIGRAM(S): 20 TABLET, DELAYED RELEASE ORAL at 06:11

## 2018-01-28 RX ADMIN — Medication 30 MILLIGRAM(S): at 06:11

## 2018-01-28 RX ADMIN — Medication 0.5 MILLIGRAM(S): at 08:21

## 2018-01-28 RX ADMIN — Medication 50 MILLIGRAM(S): at 06:11

## 2018-01-28 RX ADMIN — Medication 40 MILLIGRAM(S): at 06:11

## 2018-01-28 RX ADMIN — Medication 3 MILLILITER(S): at 12:39

## 2018-01-28 RX ADMIN — INSULIN GLARGINE 10 UNIT(S): 100 INJECTION, SOLUTION SUBCUTANEOUS at 22:15

## 2018-01-28 RX ADMIN — Medication 10: at 16:54

## 2018-01-28 RX ADMIN — Medication 20 MILLIGRAM(S): at 14:51

## 2018-01-28 RX ADMIN — Medication 3 MILLILITER(S): at 00:11

## 2018-01-28 RX ADMIN — Medication 6: at 12:08

## 2018-01-28 RX ADMIN — Medication 3 MILLILITER(S): at 20:04

## 2018-01-28 RX ADMIN — Medication 50 MILLIGRAM(S): at 17:50

## 2018-01-28 RX ADMIN — Medication 3 MILLILITER(S): at 16:23

## 2018-01-28 RX ADMIN — Medication 50 MICROGRAM(S): at 06:11

## 2018-01-28 NOTE — CONSULT NOTE ADULT - ASSESSMENT
82 y.o. 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 The aneurysm has not changed significantly in the past year. The patient adamantly refuses any surgical interventions and she realizes she is a very high risk. The  patient was given informed  consent.

## 2018-01-28 NOTE — PROGRESS NOTE ADULT - PROBLEM SELECTOR PLAN 3
- strict I&Os, - daily weight  - last echo 10/2017 showed grossly preserved LV function  leg edema 2+,   resume lasix

## 2018-01-28 NOTE — PROGRESS NOTE ADULT - PROBLEM SELECTOR PLAN 2
- continue duoneb Q6   - continue supplemental O2  - taper steroids 20 mg tid   -d/c zithromax , ceftriaxone   - f/u pulm consult Dr. Kramer

## 2018-01-28 NOTE — PROGRESS NOTE ADULT - SUBJECTIVE AND OBJECTIVE BOX
[facility] -- Deann Kelly, Nelly Gibson Pannella, Patel, Savella  Office # [phone]      Follow Up:  CHF, dyspnea, copd    Subjective/Observations: Patient seen and examined. Events noted. Resting comfortably in bed. Feeling better. Dyspnea improved. No complaints of chest pain,  or palpitations reported.        REVIEW OF SYSTEMS: All other review of systems is negative unless indicated above    PAST MEDICAL & SURGICAL HISTORY:  CHF (congestive heart failure)  Insulin dependent diabetes mellitus  HLD (hyperlipidemia)  HTN (hypertension)  COPD (chronic obstructive pulmonary disease)  Emphysema  DVT (deep venous thrombosis)  No significant past surgical history      MEDICATIONS  (STANDING):  ALBUTerol/ipratropium for Nebulization 3 milliLiter(s) Nebulizer every 4 hours  atorvastatin 20 milliGRAM(s) Oral at bedtime  buDESOnide   0.5 milliGRAM(s) Respule 0.5 milliGRAM(s) Inhalation two times a day  dextrose 5%. 1000 milliLiter(s) (50 mL/Hr) IV Continuous <Continuous>  dextrose 50% Injectable 12.5 Gram(s) IV Push once  dextrose 50% Injectable 25 Gram(s) IV Push once  dextrose 50% Injectable 25 Gram(s) IV Push once  furosemide    Tablet 40 milliGRAM(s) Oral daily  insulin glargine Injectable (LANTUS) 10 Unit(s) SubCutaneous at bedtime  insulin lispro (HumaLOG) corrective regimen sliding scale   SubCutaneous three times a day before meals  levothyroxine 50 MICROGram(s) Oral daily  losartan 25 milliGRAM(s) Oral daily  metoprolol     tartrate 50 milliGRAM(s) Oral two times a day  oseltamivir 30 milliGRAM(s) Oral every 12 hours  pantoprazole    Tablet 40 milliGRAM(s) Oral before breakfast  predniSONE   Tablet 20 milliGRAM(s) Oral three times a day    MEDICATIONS  (PRN):  dextrose Gel 1 Dose(s) Oral once PRN Blood Glucose LESS THAN 70 milliGRAM(s)/deciliter  glucagon  Injectable 1 milliGRAM(s) IntraMuscular once PRN Glucose LESS THAN 70 milligrams/deciliter      Allergies    No Known Allergies    Intolerances            Vital Signs Last 24 Hrs  T(C): 36.8 (28 Jan 2018 13:00), Max: 37.1 (28 Jan 2018 00:20)  T(F): 98.3 (28 Jan 2018 13:00), Max: 98.7 (28 Jan 2018 00:20)  HR: 90 (28 Jan 2018 13:00) (81 - 125)  BP: 105/61 (28 Jan 2018 13:00) (105/61 - 172/95)  BP(mean): --  RR: 20 (28 Jan 2018 13:00) (19 - 22)  SpO2: 92% (28 Jan 2018 13:00) (90% - 98%)    I&O's Summary    27 Jan 2018 07:01  -  28 Jan 2018 07:00  --------------------------------------------------------  IN: 1010 mL / OUT: 0 mL / NET: 1010 mL          PHYSICAL EXAM:  TELE:    Constitutional: NAD, awake and alert,  HEENT: Moist Mucous Membranes, Anicteric  Pulmonary: Decreased breath sounds b/l. scattered wheeze and rhonchi  Cardiovascular: Regular, S1 and S2, distant   Gastrointestinal: Bowel Sounds present, soft, nontender.   Lymph: 1-2 + lower ext edema  Skin: cellulitic changes b/l lower ext  Psych:  Mood & affect appropriate    LABS: All Labs Reviewed:                        11.4   12.0  )-----------( 136      ( 28 Jan 2018 06:13 )             35.8                         11.2   12.1  )-----------( 149      ( 27 Jan 2018 06:23 )             35.6                         11.1   6.3   )-----------( 126      ( 26 Jan 2018 06:46 )             35.6     28 Jan 2018 06:13    142    |  105    |  24     ----------------------------<  288    3.7     |  27     |  0.98   27 Jan 2018 07:54    143    |  107    |  29     ----------------------------<  348    3.7     |  26     |  1.20   26 Jan 2018 06:46    141    |  105    |  26     ----------------------------<  316    3.4     |  26     |  1.20     Ca    8.7        28 Jan 2018 06:13  Ca    8.5        27 Jan 2018 07:54  Ca    7.9        26 Jan 2018 06:46  Phos  3.0       28 Jan 2018 06:13  Phos  4.1       26 Jan 2018 06:46  Mg     2.1       28 Jan 2018 06:13  Mg     2.0       26 Jan 2018 06:46    TPro  6.3    /  Alb  2.9    /  TBili  0.3    /  DBili  .10    /  AST  36     /  ALT  30     /  AlkPhos  67     28 Jan 2018 06:13  TPro  6.2    /  Alb  2.8    /  TBili  0.4    /  DBili  x      /  AST  32     /  ALT  28     /  AlkPhos  69     26 Jan 2018 06:46  TPro  6.1    /  Alb  2.9    /  TBili  0.5    /  DBili  x      /  AST  27     /  ALT  28     /  AlkPhos  68     26 Jan 2018 00:04    PT/INR - ( 28 Jan 2018 06:13 )   PT: 28.8 sec;   INR: 2.59 ratio           CARDIAC MARKERS ( 28 Jan 2018 06:13 )  x     / x     / 206 U/L / x     / x

## 2018-01-28 NOTE — PROGRESS NOTE ADULT - PROBLEM SELECTOR PLAN 10
as per pt had ct a year ago which patient states grew from 6.0 cm to 6.5 cm  has not seen vascular  on in patient ct incidental finding of AAA of 6.5 cm, asymptomatic,   consulted vascular for eval  maintian bp control

## 2018-01-28 NOTE — PROGRESS NOTE ADULT - ASSESSMENT
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 I think sx are likely from flu and COPD exacerbation. I would cont pulm RX  -  Appears more or less Compensated from HF POV. Resumed Lasix 40mg PO qday  - Monitor strict I&Os and daily weights.    - No tele events on Metoprolol 50mg q12.   - BP  labile. If stays elevated. resume Norvasc.   - AAA stable per vascular. Pt refusing surgery  - Trop elevation likely in setting of SAJAN. Not c/w plaque rupture. Cont ASA and statin.   - Dose Coumadin. Goal INR 2-3. INR 1.9 today slightly subtherapeutic.   - Monitor and replete electrolytes. Keep K>4.0 and Mg>2.0.   - Further cardiac workup will depend on clinical course.   - All other workup per primary team. Will followup.

## 2018-01-28 NOTE — PROGRESS NOTE ADULT - PROBLEM SELECTOR PLAN 1
Sepsis ruled out.  she has no PNA on CT chest.  AH3+. c/w tamiflu, tylenol  1 bc postive from gram postive cocci in clusters aerobic/anaerebolic, likely contaiminaiton  afebrile, lactic acid trenidng lower,

## 2018-01-28 NOTE — PROGRESS NOTE ADULT - SUBJECTIVE AND OBJECTIVE BOX
Patient is a 82y old  Female who presents with a chief complaint of SOB (26 Jan 2018 02:02)        HPI:  This is an 82 y.o. 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 Patient was present with her family at bedside. Stated that her worsening SOB started today morning despite NC and nebulizing treatment. Patient also noted that her b/l LE worsened in the last few days, son increased her lasix to 80mg daily for over a week without improvement. Son also noted that despite increase in lasix dose, he noticed that patient has decrease in urine output. Patient stated that she has trouble walking in the past few days due to increase b/l LE edema and pain. Normally patient able to ambulate from bed to bathroom before becoming SOB. Patient stated that her SOB has improved since coming to the ED, however, her LE pain and edema has not resolved. Also endorse in worsening dry cough over the past few days with very little sputum production. Patient's son stated that patient had cough a few weeks ago and was evaluated by home care service and was started on an antibiotic. Stated that her granddaughter has been sick at home. Patient also noted 2 episodes of diarrhea today morning. Denies any chest pain, dizziness, headache, nausea, vomiting, abdominal pain, hematuria, melena, or hematochezia.     In the ED, Patient presented with T101.7, 111bpm, 120/54, RR 22@92% NC   WBC 12, h/h 13.7/41.2, plt 184  INR 2.82  lactate 3.1  Na 140, K 3.6, BUN 25, Cr 1.4, glucose 180, GFR 35  CXR showed shallow inspiration, atherosclerotic aorta. Nonspecific chronic bibasilar interstitial prominence similar to prior. No focal consolidation or pleural effusion.  EKG showed sinus rhythm   Patient was started on IVF  bolus, duoneb Q6, 1x vanco, 1x zosyn, 1x solumedrol IV, and is NPO (25 Jan 2018 17:55)      SUBJECTIVE & OBJECTIVE: Pt seen and examined at bedside.     PHYSICAL EXAM:  T(C): 36.8 (01-28-18 @ 09:00), Max: 37.1 (01-28-18 @ 00:20)  HR: 115 (01-28-18 @ 09:00) (81 - 125)  BP: 123/83 (01-28-18 @ 09:00) (122/78 - 172/95)  RR: 22 (01-28-18 @ 09:00) (19 - 22)  SpO2: 92% (01-28-18 @ 09:00) (90% - 98%)  Wt(kg): --   GENERAL: NAD, well-groomed, well-developed  HEAD:  Atraumatic, Normocephalic  EYES: EOMI, PERRLA, conjunctiva and sclera clear  ENMT: Moist mucous membranes  NECK: Supple, No JVD  NERVOUS SYSTEM:  Alert & Oriented X3,  CHEST/LUNG: Clear to auscultation bilaterally; No rales, rhonchi, wheezing, or rubs  HEART: Regular rate and rhythm; No murmurs, rubs, or gallops  ABDOMEN: Soft, Nontender, Nondistended; Bowel sounds present  EXTREMITIES:  2+ Peripheral Pulses, pedal edema 2+        MEDICATIONS  (STANDING):  ALBUTerol/ipratropium for Nebulization 3 milliLiter(s) Nebulizer every 4 hours  atorvastatin 20 milliGRAM(s) Oral at bedtime  buDESOnide   0.5 milliGRAM(s) Respule 0.5 milliGRAM(s) Inhalation two times a day  dextrose 5%. 1000 milliLiter(s) (50 mL/Hr) IV Continuous <Continuous>  dextrose 50% Injectable 12.5 Gram(s) IV Push once  dextrose 50% Injectable 25 Gram(s) IV Push once  dextrose 50% Injectable 25 Gram(s) IV Push once  furosemide    Tablet 40 milliGRAM(s) Oral daily  insulin glargine Injectable (LANTUS) 10 Unit(s) SubCutaneous at bedtime  insulin lispro (HumaLOG) corrective regimen sliding scale   SubCutaneous three times a day before meals  levothyroxine 50 MICROGram(s) Oral daily  methylPREDNISolone sodium succinate Injectable 40 milliGRAM(s) IV Push every 8 hours  metoprolol     tartrate 50 milliGRAM(s) Oral two times a day  oseltamivir 30 milliGRAM(s) Oral every 12 hours  pantoprazole    Tablet 40 milliGRAM(s) Oral before breakfast    MEDICATIONS  (PRN):  dextrose Gel 1 Dose(s) Oral once PRN Blood Glucose LESS THAN 70 milliGRAM(s)/deciliter  glucagon  Injectable 1 milliGRAM(s) IntraMuscular once PRN Glucose LESS THAN 70 milligrams/deciliter      LABS:                        11.4   12.0  )-----------( 136      ( 28 Jan 2018 06:13 )             35.8     01-28    142  |  105  |  24<H>  ----------------------------<  288<H>  3.7   |  27  |  0.98    Ca    8.7      28 Jan 2018 06:13  Phos  3.0     01-28  Mg     2.1     01-28    TPro  6.3  /  Alb  2.9<L>  /  TBili  0.3  /  DBili  .10  /  AST  36  /  ALT  30  /  AlkPhos  67  01-28    PT/INR - ( 28 Jan 2018 06:13 )   PT: 28.8 sec;   INR: 2.59 ratio             Magnesium, Serum: 2.1 mg/dL (01-28 @ 06:13)    CAPILLARY BLOOD GLUCOSE      POCT Blood Glucose.: 263 mg/dL (28 Jan 2018 07:54)  POCT Blood Glucose.: 273 mg/dL (27 Jan 2018 22:04)  POCT Blood Glucose.: 374 mg/dL (27 Jan 2018 16:49)  POCT Blood Glucose.: 356 mg/dL (27 Jan 2018 11:44)      CAPILLARY BLOOD GLUCOSE      POCT Blood Glucose.: 263 mg/dL (28 Jan 2018 07:54)  POCT Blood Glucose.: 273 mg/dL (27 Jan 2018 22:04)  POCT Blood Glucose.: 374 mg/dL (27 Jan 2018 16:49)  POCT Blood Glucose.: 356 mg/dL (27 Jan 2018 11:44)    CAPILLARY BLOOD GLUCOSE      POCT Blood Glucose.: 263 mg/dL (28 Jan 2018 07:54)    ABG - ( 27 Jan 2018 18:22 )  pH: 7.42  /  pCO2: 31    /  pO2: 69    / HCO3: 22    / Base Excess: -3.9  /  SaO2: 94                CARDIAC MARKERS ( 28 Jan 2018 06:13 )  x     / x     / 206 U/L / x     / x            RECENT CULTURES:      RADIOLOGY & ADDITIONAL TESTS:                        DVT/GI ppx  Discussed with pt @ bedside

## 2018-01-28 NOTE — PROVIDER CONTACT NOTE (CRITICAL VALUE NOTIFICATION) - TEST AND RESULT REPORTED:
Positive Preliminary Blood Cultures from 1/25/18- growth in anaerobic bottle gram positive cocci in clusters.

## 2018-01-28 NOTE — CONSULT NOTE ADULT - SUBJECTIVE AND OBJECTIVE BOX
History of Present Illness:  82y Female with a history of COPD ( STEROID DEPENDENT) and multiple medical issues was admitted with cough, HIDALGO  and wheezing. She was found to have the Flu. In her evaluation she had a CT scan of the chest that revealed a incidental 6.5 cm AAA. She denies abdominal , flank or back pain. She sees a vascular surgeon in [facility].  Last evaluation 1.5 years ago showed at least a 6cm AAA. The patient continues to refuse any surgical interventions.    PAST MEDICAL & SURGICAL HISTORY:  CHF (congestive heart failure)  Insulin dependent diabetes mellitus  HLD (hyperlipidemia)  HTN (hypertension)  COPD (chronic obstructive pulmonary disease)  Emphysema  DVT (deep venous thrombosis)  No significant past surgical history      Allergies    No Known Allergies    Intolerances        MEDICATIONS  (STANDING):  ALBUTerol/ipratropium for Nebulization 3 milliLiter(s) Nebulizer every 4 hours  atorvastatin 20 milliGRAM(s) Oral at bedtime  buDESOnide   0.5 milliGRAM(s) Respule 0.5 milliGRAM(s) Inhalation two times a day  dextrose 5%. 1000 milliLiter(s) (50 mL/Hr) IV Continuous <Continuous>  dextrose 50% Injectable 12.5 Gram(s) IV Push once  dextrose 50% Injectable 25 Gram(s) IV Push once  dextrose 50% Injectable 25 Gram(s) IV Push once  furosemide    Tablet 40 milliGRAM(s) Oral daily  insulin glargine Injectable (LANTUS) 10 Unit(s) SubCutaneous at bedtime  insulin lispro (HumaLOG) corrective regimen sliding scale   SubCutaneous three times a day before meals  levothyroxine 50 MICROGram(s) Oral daily  losartan 25 milliGRAM(s) Oral daily  metoprolol     tartrate 50 milliGRAM(s) Oral two times a day  oseltamivir 30 milliGRAM(s) Oral every 12 hours  pantoprazole    Tablet 40 milliGRAM(s) Oral before breakfast  predniSONE   Tablet 20 milliGRAM(s) Oral three times a day    MEDICATIONS  (PRN):  dextrose Gel 1 Dose(s) Oral once PRN Blood Glucose LESS THAN 70 milliGRAM(s)/deciliter  glucagon  Injectable 1 milliGRAM(s) IntraMuscular once PRN Glucose LESS THAN 70 milligrams/deciliter      Social History:  Smoking History: Extensive past hx of smoking  Alcohol Use: social    REVIEW OF SYSTEMS:  CONSTITUTIONAL: No weakness, fevers or chills  EYES/ENT: No visual changes;  No vertigo or throat pain   NECK: No pain or stiffness  RESPIRATORY: +++ cough, wheezing, and shortness of breath  CARDIOVASCULAR: No chest pain or palpitations  GASTROINTESTINAL: No abdominal or epigastric pain. No nausea, vomiting, or hematemesis; No diarrhea or constipation. No melena or hematochezia.  GENITOURINARY: No dysuria, frequency or hematuria  NEUROLOGICAL: No numbness or weakness  SKIN: No itching, burning, rashes, or lesions   Vascular:  No lower extremity claudication, pedal rest pain or digital ulcers.  Chronic edema and stasis of both legs  All other review of systems is negative unless indicated above.    PHYSICAL EXAM:  General:  On exam, the patient is alert nontoxic appearing Female in no acute distress.  Vital Signs Last 24 Hrs  T(C): 36.8 (28 Jan 2018 09:00), Max: 37.1 (28 Jan 2018 00:20)  T(F): 98.2 (28 Jan 2018 09:00), Max: 98.7 (28 Jan 2018 00:20)  HR: 115 (28 Jan 2018 09:00) (81 - 125)  BP: 123/83 (28 Jan 2018 09:00) (122/78 - 172/95)  BP(mean): --  RR: 22 (28 Jan 2018 09:00) (19 - 22)  SpO2: 92% (28 Jan 2018 09:00) (90% - 98%)    Neck:  4+/4+ bilateral carotid pulses; no carotid bruit, no palpable cervical masses.  Heart:  Regular, no murmurs, rubs or gallops.    Lungs:  decreased BS at both bases   Chest:  No chest wall deformities  Symmetrical chest expansion.   Abdomen: Soft and nontender.  No rebound, guarding or rigidity. Midabdominal pulsatile mass that is 6.5 cm by external diameter.  Extremities:  Feet are warm, pink with normal capillary refill times.  There  are no digital ulcers or heel decubiti.  There is bilateral ankle and calf edema with stasis skin changes and incompetent bilateral  veins superior to the medial malleoli.  There are no palpable cords or limb cellulitis.  Ragini's sign is negative bilaterally.  There is no lower extremity  cyanosis, or rubor.  On examination of the peripheral pulses:  Left leg femoral pulse is 3/4   , popliteal pulse is 2/4   ,PT Pulse is  0  , DP Pulse is 2/4  Right leg femoral pulse is  3/4   ,popliteal pulse is 2/4   , PT Pulse is 0  , DP Pulse is 2/4   Neurological:  There are no motor or sensory deficits in either lower extremity.                          11.4   12.0  )-----------( 136      ( 28 Jan 2018 06:13 )             35.8     01-28    142  |  105  |  24<H>  ----------------------------<  288<H>  3.7   |  27  |  0.98    Ca    8.7      28 Jan 2018 06:13  Phos  3.0     01-28  Mg     2.1     01-28    TPro  6.3  /  Alb  2.9<L>  /  TBili  0.3  /  DBili  .10  /  AST  36  /  ALT  30  /  AlkPhos  67  01-28    CARDIAC MARKERS ( 28 Jan 2018 06:13 )  x     / x     / 206 U/L / x     / x          PT/INR - ( 28 Jan 2018 06:13 )   PT: 28.8 sec;   INR: 2.59 ratio             Radiology:

## 2018-01-28 NOTE — PROGRESS NOTE ADULT - SUBJECTIVE AND OBJECTIVE BOX
VITALS/LABS  1/28/2018 afeb 115 120/80 22 92% 84.9   1/28/2018 W 12 Hb 11.4 Plt 136  Na 142 K 3.7 CO2 CO2 27 Cr .9   REVIEW OF SYMPTOMS    Able to give ROS  Yes     RELIABLE No   CONSTITUTIONAL Weakness Yes  Chills No Vision changes No  ENDOCRINE No unexplained hair loss No heat or cold intolerance    ALLERGY No hives  Sore throat No   RESP Coughing blood no  Shortness of breath YES   NEURO No Headache  Confusion Pain neck No   CARDIAC No Chest pain No Palpitations   GI No Pain abdomen NO   Vomiting NO   PHYSICAL EXAM    HEENT Unremarkable PERRLA atraumatic   RESP Fair air entry EXP prolonged    Harsh breath sound Resp distres mild   CARDIAC S1 S2 No S3     NO JVD    ABDOMEN SOFT BS PRESENT NOT DISTENDED No hepatosplenomegaly PEDAL EDEMA present No calf tenderness  NO rash   GENERAL Not TOXIC looking  HOSPITAL COURSE PROBLEM ASSESSMENT PLAN 1/25/2018 ADMISSION [facility]        RESP   1/26 vbg pH 746         1/27/2018 6p 5l 742/31/69          1/28 vbg pH 737                                         FLU  1/25 AH3 Positive   Oseltamivir 30.2.5d (1/26)   POSSIBLE PNEUMONIA  1/25/2018 CXR Non sp chr bibasal IS prominence similar to 10/4/2017 azithro (1/25-1/26) rocephin (1/25-1/26)   1/25 CT ch CW 9/26/2016 No pneumonia pulm edema or dominant masses Mild basl ssa juxtarenal aaa 6.5 x 6.2 cm   1/26 pct .06  1/28 W 12   1/27/2018 CXR No gross focal infiltrate Disc atelectasis R base     LACTICEMIA SEC SEPSIS V d CHF  1/25-1/26-1/26-1/26-1/27-1/28 LA 3.1 -5.1-2.2-6.5-7.8 -2.1   1/25/2018 NS 2.5 l ordered (1/25 12p)   1/25 Monitor serially  1/27 Etiology of LA not very clear   RECURRENT VTE  1/25    Warfarin (1/25)     COPD   Duoneb.4 (1/25) --> Duoneb.6 (1/26) Solumed 40.3 (1/25) --> Pred 20.3 (1/28)  pulmicort (1/25)   CAD  Atorvastat 20 (1/25) Metoprolol 50.2 (1/27)   1/26 Tr 1 .144 (i)   CHF  10/6/2017 ECHO   EF 55 RVSP 52   Lasix 40 (1/27) losartan 25 (1/28)   HYPOTHYROIDISM   Levoxyl 50 (1/25)   AAA  1/26 CT ch justarenal 6.5x6.2  AAA  1/26 Consider vasc eval surg  DM   Lantus 10 (1/26) iss (1/26)   GLOBAL ISSUE/BEST PRACTICE:        PROBLEM: Analgesia:     na                        PROBLEM: Sedation:     na               PROBLEM: HOB elevation:   y             PROBLEM: Stress ulcer proph:    protonix 40 (1/25)                       PROBLEM: VTE prophylaxis:      na                  PROBLEM: Glycemic control:    iss (1/25)    PROBLEM: Nutrition:    consistent carb MOODY (1/25)           PROBLEM: Advanced directive: na     PROBLEM: Allergies:  na      TIME SPENT Over 25 minutes aggregate care time spent on encounter; activities included   direct patient care, counseling and/or coordinating care reviewing notes, lab data/ imaging , discussion with multidisciplinary team/ patient  /family. Risks, benefits, alternatives  discussed in detail. Proper antibiotic use issues addressed Questions/concerns  were addressed  as  best as possible   SUMMARY ASSESSMENT PLAN 1/25/2018  ADMISSION [facility] P   81 f former smoker HO recurrent DVT HO coumadin coagulopaythy 9/2016 required PCC HO Cardioseptic shock 9/2016 poss Takosubo 9/2016 HO ADHF and COPD Ex 10/2017 admitted [facility] P 1/25/2018 with SOB and leg edema Ruled in positivce for flu  FLU Tamiflu (1/25)   PNEUMONIA 1/26 pct .06Azithro (1/25-1/26) Rocephin (1/25-1/26)   LACTICEMIA FC (1/25) Optimization of COPD and CHF   COPD BD CS (1/25)

## 2018-01-29 ENCOUNTER — RX RENEWAL (OUTPATIENT)
Age: 83
End: 2018-01-29

## 2018-01-29 ENCOUNTER — TRANSCRIPTION ENCOUNTER (OUTPATIENT)
Age: 83
End: 2018-01-29

## 2018-01-29 LAB
ALBUMIN SERPL ELPH-MCNC: 2.9 G/DL — LOW (ref 3.3–5)
ALP SERPL-CCNC: 67 U/L — SIGNIFICANT CHANGE UP (ref 40–120)
ALT FLD-CCNC: 31 U/L — SIGNIFICANT CHANGE UP (ref 12–78)
ANION GAP SERPL CALC-SCNC: 9 MMOL/L — SIGNIFICANT CHANGE UP (ref 5–17)
AST SERPL-CCNC: 28 U/L — SIGNIFICANT CHANGE UP (ref 15–37)
BILIRUB SERPL-MCNC: 0.6 MG/DL — SIGNIFICANT CHANGE UP (ref 0.2–1.2)
BUN SERPL-MCNC: 28 MG/DL — HIGH (ref 7–23)
CALCIUM SERPL-MCNC: 8.4 MG/DL — LOW (ref 8.5–10.1)
CHLORIDE SERPL-SCNC: 99 MMOL/L — SIGNIFICANT CHANGE UP (ref 96–108)
CO2 SERPL-SCNC: 29 MMOL/L — SIGNIFICANT CHANGE UP (ref 22–31)
CREAT SERPL-MCNC: 1.1 MG/DL — SIGNIFICANT CHANGE UP (ref 0.5–1.3)
GLUCOSE SERPL-MCNC: 310 MG/DL — HIGH (ref 70–99)
HCT VFR BLD CALC: 34.7 % — SIGNIFICANT CHANGE UP (ref 34.5–45)
HGB BLD-MCNC: 11.5 G/DL — SIGNIFICANT CHANGE UP (ref 11.5–15.5)
INR BLD: 3.36 RATIO — HIGH (ref 0.88–1.16)
MCHC RBC-ENTMCNC: 29.7 PG — SIGNIFICANT CHANGE UP (ref 27–34)
MCHC RBC-ENTMCNC: 33.1 GM/DL — SIGNIFICANT CHANGE UP (ref 32–36)
MCV RBC AUTO: 89.7 FL — SIGNIFICANT CHANGE UP (ref 80–100)
PLATELET # BLD AUTO: 124 K/UL — LOW (ref 150–400)
POTASSIUM SERPL-MCNC: 3.7 MMOL/L — SIGNIFICANT CHANGE UP (ref 3.5–5.3)
POTASSIUM SERPL-SCNC: 3.7 MMOL/L — SIGNIFICANT CHANGE UP (ref 3.5–5.3)
PROT SERPL-MCNC: 6.1 G/DL — SIGNIFICANT CHANGE UP (ref 6–8.3)
PROTHROM AB SERPL-ACNC: 37.5 SEC — HIGH (ref 9.8–12.7)
RBC # BLD: 3.87 M/UL — SIGNIFICANT CHANGE UP (ref 3.8–5.2)
RBC # FLD: 15.3 % — HIGH (ref 10.3–14.5)
SODIUM SERPL-SCNC: 137 MMOL/L — SIGNIFICANT CHANGE UP (ref 135–145)
WBC # BLD: 10.8 K/UL — HIGH (ref 3.8–10.5)
WBC # FLD AUTO: 10.8 K/UL — HIGH (ref 3.8–10.5)

## 2018-01-29 PROCEDURE — 99232 SBSQ HOSP IP/OBS MODERATE 35: CPT

## 2018-01-29 PROCEDURE — 99233 SBSQ HOSP IP/OBS HIGH 50: CPT

## 2018-01-29 RX ORDER — LOSARTAN POTASSIUM 100 MG/1
1 TABLET, FILM COATED ORAL
Qty: 0 | Refills: 0 | COMMUNITY
Start: 2018-01-29

## 2018-01-29 RX ORDER — AMLODIPINE BESYLATE 2.5 MG/1
5 TABLET ORAL DAILY
Qty: 0 | Refills: 0 | Status: DISCONTINUED | OUTPATIENT
Start: 2018-01-29 | End: 2018-01-30

## 2018-01-29 RX ADMIN — PANTOPRAZOLE SODIUM 40 MILLIGRAM(S): 20 TABLET, DELAYED RELEASE ORAL at 05:55

## 2018-01-29 RX ADMIN — Medication 0.5 MILLIGRAM(S): at 07:38

## 2018-01-29 RX ADMIN — Medication 3 MILLILITER(S): at 16:11

## 2018-01-29 RX ADMIN — Medication 3 MILLILITER(S): at 07:38

## 2018-01-29 RX ADMIN — Medication 3 MILLILITER(S): at 00:16

## 2018-01-29 RX ADMIN — Medication 3 MILLILITER(S): at 20:06

## 2018-01-29 RX ADMIN — Medication 20 MILLIGRAM(S): at 22:22

## 2018-01-29 RX ADMIN — Medication 20 MILLIGRAM(S): at 12:31

## 2018-01-29 RX ADMIN — INSULIN GLARGINE 10 UNIT(S): 100 INJECTION, SOLUTION SUBCUTANEOUS at 22:22

## 2018-01-29 RX ADMIN — Medication 30 MILLIGRAM(S): at 17:12

## 2018-01-29 RX ADMIN — Medication 3 MILLILITER(S): at 11:49

## 2018-01-29 RX ADMIN — ATORVASTATIN CALCIUM 20 MILLIGRAM(S): 80 TABLET, FILM COATED ORAL at 22:22

## 2018-01-29 RX ADMIN — Medication 20 MILLIGRAM(S): at 05:55

## 2018-01-29 RX ADMIN — Medication 8: at 08:11

## 2018-01-29 RX ADMIN — Medication 10: at 17:12

## 2018-01-29 RX ADMIN — Medication 50 MILLIGRAM(S): at 17:12

## 2018-01-29 RX ADMIN — Medication 30 MILLIGRAM(S): at 05:55

## 2018-01-29 RX ADMIN — Medication 4: at 12:31

## 2018-01-29 RX ADMIN — AMLODIPINE BESYLATE 5 MILLIGRAM(S): 2.5 TABLET ORAL at 12:31

## 2018-01-29 RX ADMIN — Medication 50 MICROGRAM(S): at 05:55

## 2018-01-29 RX ADMIN — Medication 40 MILLIGRAM(S): at 05:54

## 2018-01-29 RX ADMIN — Medication 0.5 MILLIGRAM(S): at 20:06

## 2018-01-29 RX ADMIN — Medication 50 MILLIGRAM(S): at 05:55

## 2018-01-29 RX ADMIN — Medication 3 MILLILITER(S): at 05:54

## 2018-01-29 RX ADMIN — LOSARTAN POTASSIUM 25 MILLIGRAM(S): 100 TABLET, FILM COATED ORAL at 06:37

## 2018-01-29 NOTE — PROGRESS NOTE ADULT - SUBJECTIVE AND OBJECTIVE BOX
Patient is a 82y old  Female who presents with a chief complaint of SOB (29 Jan 2018 10:30)        HPI:  This is an 82 y.o. 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 Patient was present with her family at bedside. Stated that her worsening SOB started today morning despite NC and nebulizing treatment. Patient also noted that her b/l LE worsened in the last few days, son increased her lasix to 80mg daily for over a week without improvement. Son also noted that despite increase in lasix dose, he noticed that patient has decrease in urine output. Patient stated that she has trouble walking in the past few days due to increase b/l LE edema and pain. Normally patient able to ambulate from bed to bathroom before becoming SOB. Patient stated that her SOB has improved since coming to the ED, however, her LE pain and edema has not resolved. Also endorse in worsening dry cough over the past few days with very little sputum production. Patient's son stated that patient had cough a few weeks ago and was evaluated by home care service and was started on an antibiotic. Stated that her granddaughter has been sick at home. Patient also noted 2 episodes of diarrhea today morning. Denies any chest pain, dizziness, headache, nausea, vomiting, abdominal pain, hematuria, melena, or hematochezia.     In the ED, Patient presented with T101.7, 111bpm, 120/54, RR 22@92% NC   WBC 12, h/h 13.7/41.2, plt 184  INR 2.82  lactate 3.1  Na 140, K 3.6, BUN 25, Cr 1.4, glucose 180, GFR 35  CXR showed shallow inspiration, atherosclerotic aorta. Nonspecific chronic bibasilar interstitial prominence similar to prior. No focal consolidation or pleural effusion.  EKG showed sinus rhythm   Patient was started on IVF  bolus, duoneb Q6, 1x vanco, 1x zosyn, 1x solumedrol IV, and is NPO (25 Jan 2018 17:55)      SUBJECTIVE & OBJECTIVE: Pt seen and examined at bedside.     PHYSICAL EXAM:  T(C): 36.4 (01-29-18 @ 07:50), Max: 36.8 (01-28-18 @ 13:00)  HR: 84 (01-29-18 @ 07:50) (77 - 121)  BP: 162/92 (01-29-18 @ 09:32) (105/61 - 191/104)  RR: 22 (01-29-18 @ 07:50) (20 - 28)  SpO2: 92% (01-29-18 @ 07:50) (92% - 97%)  Wt(kg): --   GENERAL: NAD, well-groomed, well-developed  HEAD:  Atraumatic, Normocephalic  EYES: EOMI, PERRLA, conjunctiva and sclera clear  ENMT: Moist mucous membranes  NECK: Supple, No JVD  NERVOUS SYSTEM:  Alert & Oriented X3, Motor Strength 5/5 B/L upper and lower extremities; DTRs 2+ intact and symmetric  CHEST/LUNG: Clear to auscultation bilaterally; No rales, rhonchi, wheezing, or rubs  HEART: Regular rate and rhythm; No murmurs, rubs, or gallops  ABDOMEN: Soft, Nontender, Nondistended; Bowel sounds present  EXTREMITIES:  2+ Peripheral Pulses, No clubbing, cyanosis, or edema        MEDICATIONS  (STANDING):  ALBUTerol/ipratropium for Nebulization 3 milliLiter(s) Nebulizer every 4 hours  atorvastatin 20 milliGRAM(s) Oral at bedtime  buDESOnide   0.5 milliGRAM(s) Respule 0.5 milliGRAM(s) Inhalation two times a day  dextrose 5%. 1000 milliLiter(s) (50 mL/Hr) IV Continuous <Continuous>  dextrose 50% Injectable 12.5 Gram(s) IV Push once  dextrose 50% Injectable 25 Gram(s) IV Push once  dextrose 50% Injectable 25 Gram(s) IV Push once  furosemide    Tablet 40 milliGRAM(s) Oral daily  insulin glargine Injectable (LANTUS) 10 Unit(s) SubCutaneous at bedtime  insulin lispro (HumaLOG) corrective regimen sliding scale   SubCutaneous three times a day before meals  levothyroxine 50 MICROGram(s) Oral daily  losartan 25 milliGRAM(s) Oral daily  metoprolol     tartrate 50 milliGRAM(s) Oral two times a day  oseltamivir 30 milliGRAM(s) Oral every 12 hours  pantoprazole    Tablet 40 milliGRAM(s) Oral before breakfast  predniSONE   Tablet 20 milliGRAM(s) Oral three times a day    MEDICATIONS  (PRN):  dextrose Gel 1 Dose(s) Oral once PRN Blood Glucose LESS THAN 70 milliGRAM(s)/deciliter  glucagon  Injectable 1 milliGRAM(s) IntraMuscular once PRN Glucose LESS THAN 70 milligrams/deciliter      LABS:                        11.5   10.8  )-----------( 124      ( 29 Jan 2018 07:21 )             34.7     01-29    137  |  99  |  28<H>  ----------------------------<  310<H>  3.7   |  29  |  1.10    Ca    8.4<L>      29 Jan 2018 07:21  Phos  3.0     01-28  Mg     2.1     01-28    TPro  6.1  /  Alb  2.9<L>  /  TBili  0.6  /  DBili  x   /  AST  28  /  ALT  31  /  AlkPhos  67  01-29    PT/INR - ( 29 Jan 2018 07:21 )   PT: 37.5 sec;   INR: 3.36 ratio               CAPILLARY BLOOD GLUCOSE      POCT Blood Glucose.: 305 mg/dL (29 Jan 2018 07:48)  POCT Blood Glucose.: 310 mg/dL (28 Jan 2018 22:18)  POCT Blood Glucose.: 351 mg/dL (28 Jan 2018 16:34)  POCT Blood Glucose.: 300 mg/dL (28 Jan 2018 12:00)      CAPILLARY BLOOD GLUCOSE      POCT Blood Glucose.: 305 mg/dL (29 Jan 2018 07:48)  POCT Blood Glucose.: 310 mg/dL (28 Jan 2018 22:18)  POCT Blood Glucose.: 351 mg/dL (28 Jan 2018 16:34)  POCT Blood Glucose.: 300 mg/dL (28 Jan 2018 12:00)    CAPILLARY BLOOD GLUCOSE      POCT Blood Glucose.: 305 mg/dL (29 Jan 2018 07:48)    ABG - ( 27 Jan 2018 18:22 )  pH: 7.42  /  pCO2: 31    /  pO2: 69    / HCO3: 22    / Base Excess: -3.9  /  SaO2: 94                CARDIAC MARKERS ( 28 Jan 2018 06:13 )  x     / x     / 206 U/L / x     / x            RECENT CULTURES:      RADIOLOGY & ADDITIONAL TESTS:                        DVT/GI ppx  Discussed with pt @ bedside

## 2018-01-29 NOTE — PROGRESS NOTE ADULT - ATTENDING COMMENTS
I was physically present for the key portions of the evaluation and management (E/M) service provided.  I agree with the above history, physical, and plan which I have reviewed and edited where appropriate.
Chart reviewed    Patient seen and examined     Agree with plan as outlined above

## 2018-01-29 NOTE — DISCHARGE NOTE ADULT - PATIENT PORTAL LINK FT
“You can access the [employer] Patient Portal, offered by [facility], by registering with the following website: [email or web address]”

## 2018-01-29 NOTE — DISCHARGE NOTE ADULT - CARE PROVIDER_API CALL
Shannon Lam), Internal Medicine  [address] [place] [address]  Phone: [phone]  Fax: [phone]

## 2018-01-29 NOTE — PROGRESS NOTE ADULT - PROBLEM SELECTOR PROBLEM 2
COPD (chronic obstructive pulmonary disease)

## 2018-01-29 NOTE — PROGRESS NOTE ADULT - SUBJECTIVE AND OBJECTIVE BOX
VITALS/LABS  1/29/2018 afeb 80 130/60 20 97% 85  1/29/2018 W 10.8 Hb 11.5 Plt 124 INR 3.36 Na 137 K 3.7 CO2 29 Cr 1.1   REVIEW OF SYMPTOMS    Able to give ROS  Yes     RELIABLE No   CONSTITUTIONAL Weakness Yes  Chills No Vision changes No  ENDOCRINE No unexplained hair loss No heat or cold intolerance    ALLERGY No hives  Sore throat No   RESP Coughing blood no  Shortness of breath YES   NEURO No Headache  Confusion Pain neck No   CARDIAC No Chest pain No Palpitations   GI No Pain abdomen NO   Vomiting NO   PHYSICAL EXAM    HEENT Unremarkable PERRLA atraumatic   RESP Fair air entry EXP prolonged    Harsh breath sound Resp distres mild   CARDIAC S1 S2 No S3     NO JVD    ABDOMEN SOFT BS PRESENT NOT DISTENDED No hepatosplenomegaly PEDAL EDEMA present No calf tenderness  NO rash   GENERAL Not TOXIC looking  HOSPITAL COURSE PROBLEM ASSESSMENT PLAN 1/25/2018 ADMISSION [facility]        RESP   1/26 vbg pH 746         1/27/2018 6p 5l 742/31/69          1/28 vbg pH 737                                         FLU  1/25 AH3 Positive   Oseltamivir 30.2.5d (1/26)   POSSIBLE PNEUMONIA  1/25/2018 CXR Non sp chr bibasal IS prominence similar to 10/4/2017 azithro (1/25-1/26) rocephin (1/25-1/26)   1/25 CT ch CW 9/26/2016 No pneumonia pulm edema or dominant masses Mild basl ssa juxtarenal aaa 6.5 x 6.2 cm   1/26 pct .06  1/28-1/29 W 12 - 10.8  1/27/2018 CXR No gross focal infiltrate Disc atelectasis R base     LACTICEMIA SEC SEPSIS V d CHF  1/25-1/26-1/26-1/26-1/27-1/28 LA 3.1 -5.1-2.2-6.5-7.8 -2.1   1/25/2018 NS 2.5 l ordered (1/25 12p)   1/25 Monitor serially  1/27 Etiology of LA not very clear   RECURRENT VTE  1/25    Warfarin (1/25)     COPD   Duoneb.4 (1/25) --> Duoneb.6 (1/26) Solumed 40.3 (1/25) --> Pred 20.3 (1/28)  pulmicort (1/25)   CAD  Atorvastat 20 (1/25) Metoprolol 50.2 (1/27)   1/26 Tr 1 .144 (i)   CHF  10/6/2017 ECHO   EF 55 RVSP 52   Lasix 40 (1/27) losartan 25 (1/28)   HYPOTHYROIDISM   Levoxyl 50 (1/25)   AAA  1/26 CT ch justarenal 6.5x6.2  AAA  1/26 Consider vasc eval surg  DM   Lantus 10 (1/26) iss (1/26)   GLOBAL ISSUE/BEST PRACTICE:        PROBLEM: Analgesia:     na                        PROBLEM: Sedation:     na               PROBLEM: HOB elevation:   y             PROBLEM: Stress ulcer proph:    protonix 40 (1/25)                       PROBLEM: VTE prophylaxis:      na                  PROBLEM: Glycemic control:    iss (1/25)    PROBLEM: Nutrition:    consistent carb MOODY (1/25)           PROBLEM: Advanced directive: na     PROBLEM: Allergies:  na      TIME SPENT Over 25 minutes aggregate care time spent on encounter; activities included   direct patient care, counseling and/or coordinating care reviewing notes, lab data/ imaging , discussion with multidisciplinary team/ patient  /family. Risks, benefits, alternatives  discussed in detail. Proper antibiotic use issues addressed Questions/concerns  were addressed  as  best as possible   SUMMARY ASSESSMENT PLAN 1/25/2018  ADMISSION [facility] P   81 f former smoker HO recurrent DVT HO coumadin coagulopaythy 9/2016 required PCC HO Cardioseptic shock 9/2016 poss Takosubo 9/2016 HO ADHF and COPD Ex 10/2017 admitted [facility] P 1/25/2018 with SOB and leg edema Ruled in positivce for flu  FLU Tamiflu (1/25)   PNEUMONIA 1/26 pct .06Azithro (1/25-1/26) Rocephin (1/25-1/26)   LACTICEMIA FC (1/25) Optimization of COPD and CHF   COPD BD CS (1/25)

## 2018-01-29 NOTE — DISCHARGE NOTE ADULT - HOSPITAL COURSE
82 y.o. 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 Admit for sepsis likely 2/2 COPD exacerbation   she has no PNA on CT chest.  AH3+. c/w tamiflu, tylenol  1 bc postive from gram postive cocci in clusters aerobic/anaerebolic, likely contaiminaiton  afebrile,lactic acidosis resolved      ·  Problem: COPD (chronic obstructive pulmonary disease).  Plan: - continue duoneb Q6   - continue supplemental O2  - taper steroids  - f/u pulm consult Dr. Kramer.         diastolic CHF (congestive heart failure).    - last echo 10/2017 showed grossly preserved LV function  leg edema 2+,   resume lasix       SAJAN (acute kidney injury).  Plan: Imroving , resume lasix   monitor creatine.        DVT (deep venous thrombosis).  Plan: - b/l LE edema with pain on palpation   - LE doppler neg  - continue coumadin   - f/u daily INR.                  HLD (hyperlipidemia).  Plan: - continue atorvastatin.     pt can proceed for d/c planning 82 y.o. 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 Admit for sepsis likely 2/2 COPD exacerbation   she has no PNA on CT chest.  AH3+. c/w tamiflu, tylenol  1 bc postive from gram postive cocci in clusters aerobic/anaerebolic, likely contaiminaiton  afebrile,lactic acidosis resolved      ·  Problem: COPD (chronic obstructive pulmonary disease).  Plan: - continue duoneb Q6   - continue supplemental O2  - taper steroids  - f/u pulm consult Dr. Kramer.         diastolic CHF (congestive heart failure).    - last echo 10/2017 showed grossly preserved LV function  leg edema 2+,   resume lasix       SAJAN (acute kidney injury).  Plan: Imroving , resume lasix   monitor creatine.        DVT (deep venous thrombosis).  Plan: - b/l LE edema with pain on palpation   - LE doppler neg  - continue coumadin   - f/u daily INR.                  HLD (hyperlipidemia).  Plan: - continue atorvastatin.     pt can proceed for d/c planning   PE  NAD  chest s1, s2  lungs decrease air entry b/l, no wheezing   abd:BS+

## 2018-01-29 NOTE — PROGRESS NOTE ADULT - PROBLEM SELECTOR PLAN 10
as per pt had ct a year ago which patient states grew from 6.0 cm to 6.5 cm  has not seen vascular  on in patient ct incidental finding of AAA of 6.5 cm, asymptomatic,   vascular eval appriecated, pt declined any intervention

## 2018-01-29 NOTE — DISCHARGE NOTE ADULT - SECONDARY DIAGNOSIS.
AAA (abdominal aortic aneurysm) CHF (congestive heart failure) Chronic obstructive pulmonary disease with acute exacerbation DVT (deep venous thrombosis) HTN (hypertension)

## 2018-01-29 NOTE — DISCHARGE NOTE ADULT - PLAN OF CARE
resolving, to complete tamilfu home 02 has 6.5 cm, vascular consulted, pt declined intervention euvolmic, preserved EF, secondary to 1, continue taper rpedisone coumadin stable

## 2018-01-29 NOTE — DISCHARGE NOTE ADULT - MEDICATION SUMMARY - MEDICATIONS TO TAKE
I will START or STAY ON the medications listed below when I get home from the hospital:    Pulmicort Flexhaler 90 mcg/inh inhalation powder  -- 2 puff(s) inhaled 2 times a day  -- Indication: For COPD (chronic obstructive pulmonary disease)    predniSONE 10 mg oral tablet  -- 1 tab(s) by mouth 2 times a day  -- Indication: For COPD (chronic obstructive pulmonary disease)    aspirin 81 mg oral tablet, chewable  -- 1 tab(s) by mouth once a day  -- Indication: For Cad    losartan 25 mg oral tablet  -- 1 tab(s) by mouth once a day  -- Indication: For HTN (hypertension)    warfarin 2.5 mg oral tablet  -- 1 tab(s) by mouth once a day Sunday, Monday, Tuesday, Wednesday, Thursday   -- Indication: For DVT (deep venous thrombosis)    warfarin 2.5 mg oral tablet  -- 2 tab(s) by mouth once a day, Friday, Saturday  -- Indication: For DVT (deep venous thrombosis)    Lantus Solostar Pen 100 units/mL subcutaneous solution  -- 10 unit(s) subcutaneous once a day (at bedtime)  -- Indication: For Dm    atorvastatin 20 mg oral tablet  -- 1 tab(s) by mouth once a day (at bedtime)  -- Indication: For HLD (hyperlipidemia)    oseltamivir 30 mg oral capsule  -- 1 cap(s) by mouth every 12 hours  -- Indication: For flu    Xanax 0.25 mg oral tablet  -- 1 tab(s) by mouth 2 times a day  -- Indication: For Axniety    metoprolol tartrate 50 mg oral tablet  -- 1 tab(s) by mouth 2 times a day  -- Indication: For HTN (hypertension)    ipratropium-albuterol 0.5 mg-2.5 mg/3 mLinhalation solution  -- 1 unit(s) inhaled every 4 hours, As Needed  -- Indication: For COPD (chronic obstructive pulmonary disease)    amLODIPine 5 mg oral tablet  -- 1 tab(s) by mouth once a day  -- Indication: For HTN (hypertension)    furosemide 40 mg oral tablet  -- 1 tab(s) by mouth once a day, As Needed  -- Indication: For CHF (congestive heart failure)    potassium chloride 10 mEq oral capsule, extended release  -- 1 cap(s) by mouth once a day  -- Indication: For Hypok    pantoprazole 40 mg oral delayed release tablet  -- 1 tab(s) by mouth once a day (before a meal)  -- Indication: For gerd    levothyroxine 50 mcg (0.05 mg) oral tablet  -- 1 tab(s) by mouth once a day  -- Indication: For Hypohtyroid I will START or STAY ON the medications listed below when I get home from the hospital:    Pulmicort Flexhaler 90 mcg/inh inhalation powder  -- 2 puff(s) inhaled 2 times a day  -- Indication: For COPD (chronic obstructive pulmonary disease)    predniSONE 10 mg oral tablet  -- 1 tab(s) by mouth 2 times a day  -- Indication: For COPD (chronic obstructive pulmonary disease)    aspirin 81 mg oral tablet, chewable  -- 1 tab(s) by mouth once a day  -- Indication: For Cad    losartan 50 mg oral tablet  -- 1 tab(s) by mouth once a day  -- Indication: For HTN (hypertension)    warfarin 2.5 mg oral tablet  -- 1 tab(s) by mouth once a day Sunday, Monday, Tuesday, Wednesday, Thursday   -- Indication: For DVT (deep venous thrombosis)    warfarin 2.5 mg oral tablet  -- 2 tab(s) by mouth once a day, Friday, Saturday  -- Indication: For DVT (deep venous thrombosis)    Lantus Solostar Pen 100 units/mL subcutaneous solution  -- 10 unit(s) subcutaneous once a day (at bedtime)  -- Indication: For Dm    atorvastatin 20 mg oral tablet  -- 1 tab(s) by mouth once a day (at bedtime)  -- Indication: For HLD (hyperlipidemia)    oseltamivir 30 mg oral capsule  -- 1 cap(s) by mouth every 12 hours  -- Indication: For flu    Xanax 0.25 mg oral tablet  -- 1 tab(s) by mouth 2 times a day  -- Indication: For Axniety    metoprolol tartrate 50 mg oral tablet  -- 1 tab(s) by mouth 2 times a day  -- Indication: For HTN (hypertension)    ipratropium-albuterol 0.5 mg-2.5 mg/3 mLinhalation solution  -- 1 unit(s) inhaled every 4 hours, As Needed  -- Indication: For COPD (chronic obstructive pulmonary disease)    amLODIPine 5 mg oral tablet  -- 1 tab(s) by mouth once a day  -- Indication: For HTN (hypertension)    furosemide 40 mg oral tablet  -- 1 tab(s) by mouth once a day, As Needed  -- Indication: For CHF (congestive heart failure)    potassium chloride 10 mEq oral capsule, extended release  -- 1 cap(s) by mouth once a day  -- Indication: For Hypok    pantoprazole 40 mg oral delayed release tablet  -- 1 tab(s) by mouth once a day (before a meal)  -- Indication: For gerd    levothyroxine 50 mcg (0.05 mg) oral tablet  -- 1 tab(s) by mouth once a day  -- Indication: For Hypohtyroid

## 2018-01-29 NOTE — PROGRESS NOTE ADULT - PROBLEM SELECTOR PLAN 2
- continue duoneb Q6   - continue supplemental O2  - taper steroids 20 mg t  -d/c zithromax , ceftriaxone   - f/u pulm consult Dr. Kramer

## 2018-01-29 NOTE — DISCHARGE NOTE ADULT - CARE PLAN
Principal Discharge DX:	Influenza A with respiratory manifestations  Goal:	resolving, to complete tamilfu  Assessment and plan of treatment:	home 02  Secondary Diagnosis:	AAA (abdominal aortic aneurysm)  Goal:	has 6.5 cm, vascular consulted, pt declined intervention  Secondary Diagnosis:	CHF (congestive heart failure)  Goal:	euvolmic, preserved EF,  Secondary Diagnosis:	Chronic obstructive pulmonary disease with acute exacerbation  Goal:	secondary to 1, continue taper rpedisone  Secondary Diagnosis:	DVT (deep venous thrombosis)  Goal:	coumadin  Secondary Diagnosis:	HTN (hypertension)  Goal:	stable

## 2018-01-29 NOTE — PROGRESS NOTE ADULT - SUBJECTIVE AND OBJECTIVE BOX
HPI:  This is an 82 y.o. 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 Patient was present with her family at bedside. Stated that her worsening SOB started today morning despite NC and nebulizing treatment. Patient also noted that her b/l LE worsened in the last few days, son increased her lasix to 80mg daily for over a week without improvement. Son also noted that despite increase in lasix dose, he noticed that patient has decrease in urine output. Patient stated that she has trouble walking in the past few days due to increase b/l LE edema and pain. Normally patient able to ambulate from bed to bathroom before becoming SOB. Patient stated that her SOB has improved since coming to the ED, however, her LE pain and edema has not resolved. Also endorse in worsening dry cough over the past few days with very little sputum production. Patient's son stated that patient had cough a few weeks ago and was evaluated by home care service and was started on an antibiotic. Stated that her granddaughter has been sick at home. Patient also noted 2 episodes of diarrhea today morning. Denies any chest pain, dizziness, headache, nausea, vomiting, abdominal pain, hematuria, melena, or hematochezia.     In the ED, Patient presented with T101.7, 111bpm, 120/54, RR 22@92% NC   WBC 12, h/h 13.7/41.2, plt 184  INR 2.82  lactate 3.1  Na 140, K 3.6, BUN 25, Cr 1.4, glucose 180, GFR 35  CXR showed shallow inspiration, atherosclerotic aorta. Nonspecific chronic bibasilar interstitial prominence similar to prior. No focal consolidation or pleural effusion.  EKG showed sinus rhythm   Patient was started on IVF  bolus, duoneb Q6, 1x vanco, 1x zosyn, 1x solumedrol IV, and is NPO (2018 17:55)      SUBJECTIVE:        OBJECTIVE:  Review Of Systems:  Constitutional: [ ] Fever [ ] Chills [ ] Fatigue [ ] Weight change   HEENT: [ ] Blurred vision [ ] Eye Pain [ ] Headache [ ] Runny nose [ ] Sore Throat   Respiratory: [ ] Cough [ ] Wheezing [ ] Shortness of breath  Cardiovascular: [ ] Chest Pain [ ] Palpitations [ ] HIDALGO [ ] PND [ ] Orthopnea  Gastrointestinal: [ ] Abdominal Pain [ ] Diarrhea [ ] Constipation [ ] Hemorrhoids [ ] Nausea [ ] Vomiting  Genitourinary: [ ] Nocturia [ ] Dysuria [ ] Incontinence  Extremities: [ ] Swelling [ ] Joint Pain  Neurologic: [ ] Focal deficit [ ] Paresthesias [ ] Syncope  Lymphatic: [ ] Swelling [ ] Lymphadenopathy   Skin: [ ] Rash [ ] Ecchymoses [ ] Wounds [ ] Lesions  Psychiatry: [ ] Depression [ ] Suicidal/Homicidal Ideation [ ] Anxiety [ ] Sleep Disturbances  [ ] 10 point review of systems is otherwise negative except as mentioned above              [ ]Unable to obtain    Allergy:  No Known Allergies    Medications:  MEDICATIONS  (STANDING):  ALBUTerol/ipratropium for Nebulization 3 milliLiter(s) Nebulizer every 4 hours  atorvastatin 20 milliGRAM(s) Oral at bedtime  buDESOnide   0.5 milliGRAM(s) Respule 0.5 milliGRAM(s) Inhalation two times a day  dextrose 5%. 1000 milliLiter(s) (50 mL/Hr) IV Continuous <Continuous>  dextrose 50% Injectable 12.5 Gram(s) IV Push once  dextrose 50% Injectable 25 Gram(s) IV Push once  dextrose 50% Injectable 25 Gram(s) IV Push once  furosemide    Tablet 40 milliGRAM(s) Oral daily  insulin glargine Injectable (LANTUS) 10 Unit(s) SubCutaneous at bedtime  insulin lispro (HumaLOG) corrective regimen sliding scale   SubCutaneous three times a day before meals  levothyroxine 50 MICROGram(s) Oral daily  losartan 25 milliGRAM(s) Oral daily  metoprolol     tartrate 50 milliGRAM(s) Oral two times a day  oseltamivir 30 milliGRAM(s) Oral every 12 hours  pantoprazole    Tablet 40 milliGRAM(s) Oral before breakfast  predniSONE   Tablet 20 milliGRAM(s) Oral three times a day    MEDICATIONS  (PRN):  dextrose Gel 1 Dose(s) Oral once PRN Blood Glucose LESS THAN 70 milliGRAM(s)/deciliter  glucagon  Injectable 1 milliGRAM(s) IntraMuscular once PRN Glucose LESS THAN 70 milligrams/deciliter      PMH/PSH/FH/SH: [ ] Unchanged    Vitals:  T(C): 36.4 (18 @ 07:50), Max: 36.8 (18 @ 13:00)  HR: 84 (18 @ 07:50) (77 - 121)  BP: 191/104 (18 @ 07:50) (105/61 - 191/104)  BP(mean): --  RR: 22 (18 @ 07:50) (20 - 28)  SpO2: 92% (18 @ 07:50) (92% - 97%)  Wt(kg): --  Daily     Daily Weight in k.1 (2018 04:42)  I&O's Summary      Labs:                        11.5   10.8  )-----------( 124      ( 2018 07:21 )             34.7         137  |  99  |  28<H>  ----------------------------<  310<H>  3.7   |  29  |  1.10    Ca    8.4<L>      2018 07:21  Phos  3.0       Mg     2.1         TPro  6.1  /  Alb  2.9<L>  /  TBili  0.6  /  DBili  x   /  AST  28  /  ALT  31  /  AlkPhos  67      PT/INR - ( 2018 07:21 )   PT: 37.5 sec;   INR: 3.36 ratio           CARDIAC MARKERS ( 2018 06:13 )  x     / x     / 206 U/L / x     / x          Interpretation of Telemetry: SR-ST  no acute events noted overnight       Physical Exam:  Appearance: [ ] Normal  [ ] abnormal [ ] NAD   Eyes: [ ] PERRL [ ] EOMI  HENT: [ ] Normal [ ] Abnormal oral mucosa [ ]NC/AT  Cardiovascular: [ ] S1 [ ] S2 [ ] RRR [ ] m/r/g [ ]edema [ ] JVP  Procedural Access Site: [ ]  hematoma [ ] tender to palpation [ ] 2+ pulse [ ] bruit [ ] Ecchymosis  Respiratory: [ ] Clear to auscultation bilaterally  Gastrointestinal: [ ] Soft [ ] tenderness[ ] distension [ ] BS  Musculoskeletal: [ ] clubbing [ ] joint deformity   Neurologic: [ ] Non-focal  Lymphatic: [ ] lymphadenopathy  Psychiatry: [ ] AAOx3  [ ] confused [ ] disoriented [ ] Mood & affect appropriate  Skin: [ ]  rashes [ ] ecchymoses [ ] cyanosis HPI:  This is an 82 y.o. 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 Patient was present with her family at bedside. Stated that her worsening SOB started today morning despite NC and nebulizing treatment. Patient also noted that her b/l LE worsened in the last few days, son increased her lasix to 80mg daily for over a week without improvement. Son also noted that despite increase in lasix dose, he noticed that patient has decrease in urine output. Patient stated that she has trouble walking in the past few days due to increase b/l LE edema and pain. Normally patient able to ambulate from bed to bathroom before becoming SOB. Patient stated that her SOB has improved since coming to the ED, however, her LE pain and edema has not resolved. Also endorse in worsening dry cough over the past few days with very little sputum production. Patient's son stated that patient had cough a few weeks ago and was evaluated by home care service and was started on an antibiotic. Stated that her granddaughter has been sick at home. Patient also noted 2 episodes of diarrhea today morning. Denies any chest pain, dizziness, headache, nausea, vomiting, abdominal pain, hematuria, melena, or hematochezia.     In the ED, Patient presented with T101.7, 111bpm, 120/54, RR 22@92% NC   WBC 12, h/h 13.7/41.2, plt 184  INR 2.82  lactate 3.1  Na 140, K 3.6, BUN 25, Cr 1.4, glucose 180, GFR 35  CXR showed shallow inspiration, atherosclerotic aorta. Nonspecific chronic bibasilar interstitial prominence similar to prior. No focal consolidation or pleural effusion.  EKG showed sinus rhythm   Patient was started on IVF  bolus, duoneb Q6, 1x vanco, 1x zosyn, 1x solumedrol IV, and is NPO (2018 17:55)      SUBJECTIVE:  Pt. sitting comfortably in chair no c/o chest pain no c/o SOB pt. in no acute distress at this time      OBJECTIVE:  Review Of Systems:  Constitutional: [ ] Fever [ ] Chills [ ] Fatigue [ ] Weight change   HEENT: [ ] Blurred vision [ ] Eye Pain [ ] Headache [ ] Runny nose [ ] Sore Throat   Respiratory: [ ] Cough [ ] Wheezing [ ] Shortness of breath  Cardiovascular: [ ] Chest Pain [ ] Palpitations [ ] HIDALGO [ ] PND [ ] Orthopnea  Gastrointestinal: [ ] Abdominal Pain [ ] Diarrhea [ ] Constipation [ ] Hemorrhoids [ ] Nausea [ ] Vomiting  Genitourinary: [ ] Nocturia [ ] Dysuria [ ] Incontinence  Extremities: [ ] Swelling [ ] Joint Pain  Neurologic: [ ] Focal deficit [ ] Paresthesias [ ] Syncope  Lymphatic: [ ] Swelling [ ] Lymphadenopathy   Skin: [ ] Rash [ ] Ecchymoses [ ] Wounds [ ] Lesions  Psychiatry: [ ] Depression [ ] Suicidal/Homicidal Ideation [ ] Anxiety [ ] Sleep Disturbances  [X ] 10 point review of systems is otherwise negative except as mentioned above              [ ]Unable to obtain    Allergy:  No Known Allergies    Medications:  MEDICATIONS  (STANDING):  ALBUTerol/ipratropium for Nebulization 3 milliLiter(s) Nebulizer every 4 hours  atorvastatin 20 milliGRAM(s) Oral at bedtime  buDESOnide   0.5 milliGRAM(s) Respule 0.5 milliGRAM(s) Inhalation two times a day  dextrose 5%. 1000 milliLiter(s) (50 mL/Hr) IV Continuous <Continuous>  dextrose 50% Injectable 12.5 Gram(s) IV Push once  dextrose 50% Injectable 25 Gram(s) IV Push once  dextrose 50% Injectable 25 Gram(s) IV Push once  furosemide    Tablet 40 milliGRAM(s) Oral daily  insulin glargine Injectable (LANTUS) 10 Unit(s) SubCutaneous at bedtime  insulin lispro (HumaLOG) corrective regimen sliding scale   SubCutaneous three times a day before meals  levothyroxine 50 MICROGram(s) Oral daily  losartan 25 milliGRAM(s) Oral daily  metoprolol     tartrate 50 milliGRAM(s) Oral two times a day  oseltamivir 30 milliGRAM(s) Oral every 12 hours  pantoprazole    Tablet 40 milliGRAM(s) Oral before breakfast  predniSONE   Tablet 20 milliGRAM(s) Oral three times a day    MEDICATIONS  (PRN):  dextrose Gel 1 Dose(s) Oral once PRN Blood Glucose LESS THAN 70 milliGRAM(s)/deciliter  glucagon  Injectable 1 milliGRAM(s) IntraMuscular once PRN Glucose LESS THAN 70 milligrams/deciliter      PMH/PSH/FH/SH: [ ] Unchanged    Vitals:  T(C): 36.4 (18 @ 07:50), Max: 36.8 (18 @ 13:00)  HR: 84 (18 @ 07:50) (77 - 121)  BP: 191/104 (18 @ 07:50) (105/61 - 191/104)  BP(mean): --  RR: 22 (18 @ 07:50) (20 - 28)  SpO2: 92% (18 @ 07:50) (92% - 97%)  Wt(kg): --  Daily     Daily Weight in k.1 (2018 04:42)  I&O's Summary      Labs:                        11.5   10.8  )-----------( 124      ( 2018 07:21 )             34.7         137  |  99  |  28<H>  ----------------------------<  310<H>  3.7   |  29  |  1.10    Ca    8.4<L>      2018 07:21  Phos  3.0       Mg     2.1         TPro  6.1  /  Alb  2.9<L>  /  TBili  0.6  /  DBili  x   /  AST  28  /  ALT  31  /  AlkPhos  67      PT/INR - ( 2018 07:21 )   PT: 37.5 sec;   INR: 3.36 ratio           CARDIAC MARKERS ( 2018 06:13 )  x     / x     / 206 U/L / x     / x          Interpretation of Telemetry: SR-ST  no acute events noted overnight       Physical Exam:  Appearance: [ ] Normal  [ ] abnormal [X ] NAD   Eyes: [ ] PERRL [ ] EOMI  HENT: [ ] Normal [ ] Abnormal oral mucosa [ ]NC/AT  Cardiovascular: [X ] S1 [X ] S2 [X ] trace B/L pedal edema   Procedural Access Site: [ ]  hematoma [ ] tender to palpation [ ] 2+ pulse [ ] bruit [ ] Ecchymosis  Respiratory: [X ] Clear to auscultation bilaterally B/L upper lobes diminished breath B/L lower lobes   Gastrointestinal: [ ] Soft [ ] tenderness[ ] distension [ ] BS  Musculoskeletal: [ ] clubbing [ ] joint deformity   Neurologic: [ ] Non-focal  Lymphatic: [ ] lymphadenopathy  Psychiatry: [ ] AAOx3  [ ] confused [ ] disoriented [X ] Mood & affect appropriate  Skin: [ ]  rashes [ ] ecchymoses [ ] cyanosis HPI:  This is an 82 y.o. 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 Patient was present with her family at bedside. Stated that her worsening SOB started today morning despite NC and nebulizing treatment. Patient also noted that her b/l LE worsened in the last few days, son increased her lasix to 80mg daily for over a week without improvement. Son also noted that despite increase in lasix dose, he noticed that patient has decrease in urine output. Patient stated that she has trouble walking in the past few days due to increase b/l LE edema and pain. Normally patient able to ambulate from bed to bathroom before becoming SOB. Patient stated that her SOB has improved since coming to the ED, however, her LE pain and edema has not resolved. Also endorse in worsening dry cough over the past few days with very little sputum production. Patient's son stated that patient had cough a few weeks ago and was evaluated by home care service and was started on an antibiotic. Stated that her granddaughter has been sick at home. Patient also noted 2 episodes of diarrhea today morning. Denies any chest pain, dizziness, headache, nausea, vomiting, abdominal pain, hematuria, melena, or hematochezia.     In the ED, Patient presented with T101.7, 111bpm, 120/54, RR 22@92% NC   WBC 12, h/h 13.7/41.2, plt 184  INR 2.82  lactate 3.1  Na 140, K 3.6, BUN 25, Cr 1.4, glucose 180, GFR 35  CXR showed shallow inspiration, atherosclerotic aorta. Nonspecific chronic bibasilar interstitial prominence similar to prior. No focal consolidation or pleural effusion.  EKG showed sinus rhythm   Patient was started on IVF  bolus, duoneb Q6, 1x vanco, 1x zosyn, 1x solumedrol IV, and is NPO (2018 17:55)      SUBJECTIVE:  Pt. sitting comfortably in chair no c/o chest pain no c/o SOB pt. in no acute distress at this time      OBJECTIVE:  Review Of Systems:  Constitutional: [ ] Fever [ ] Chills [ ] Fatigue [ ] Weight change   HEENT: [ ] Blurred vision [ ] Eye Pain [ ] Headache [ ] Runny nose [ ] Sore Throat   Respiratory: [ ] Cough [ ] Wheezing [ ] Shortness of breath  Cardiovascular: [ ] Chest Pain [ ] Palpitations [ ] HIDALGO [ ] PND [ ] Orthopnea  Gastrointestinal: [ ] Abdominal Pain [ ] Diarrhea [ ] Constipation [ ] Hemorrhoids [ ] Nausea [ ] Vomiting  Genitourinary: [ ] Nocturia [ ] Dysuria [ ] Incontinence  Extremities: [ ] Swelling [ ] Joint Pain  Neurologic: [ ] Focal deficit [ ] Paresthesias [ ] Syncope  Lymphatic: [ ] Swelling [ ] Lymphadenopathy   Skin: [ ] Rash [ ] Ecchymoses [ ] Wounds [ ] Lesions  Psychiatry: [ ] Depression [ ] Suicidal/Homicidal Ideation [ ] Anxiety [ ] Sleep Disturbances  [X ] 10 point review of systems is otherwise negative except as mentioned above              [ ]Unable to obtain    Allergy:  No Known Allergies    Medications:  MEDICATIONS  (STANDING):  ALBUTerol/ipratropium for Nebulization 3 milliLiter(s) Nebulizer every 4 hours  atorvastatin 20 milliGRAM(s) Oral at bedtime  buDESOnide   0.5 milliGRAM(s) Respule 0.5 milliGRAM(s) Inhalation two times a day  dextrose 5%. 1000 milliLiter(s) (50 mL/Hr) IV Continuous <Continuous>  dextrose 50% Injectable 12.5 Gram(s) IV Push once  dextrose 50% Injectable 25 Gram(s) IV Push once  dextrose 50% Injectable 25 Gram(s) IV Push once  furosemide    Tablet 40 milliGRAM(s) Oral daily  insulin glargine Injectable (LANTUS) 10 Unit(s) SubCutaneous at bedtime  insulin lispro (HumaLOG) corrective regimen sliding scale   SubCutaneous three times a day before meals  levothyroxine 50 MICROGram(s) Oral daily  losartan 25 milliGRAM(s) Oral daily  metoprolol     tartrate 50 milliGRAM(s) Oral two times a day  oseltamivir 30 milliGRAM(s) Oral every 12 hours  pantoprazole    Tablet 40 milliGRAM(s) Oral before breakfast  predniSONE   Tablet 20 milliGRAM(s) Oral three times a day    MEDICATIONS  (PRN):  dextrose Gel 1 Dose(s) Oral once PRN Blood Glucose LESS THAN 70 milliGRAM(s)/deciliter  glucagon  Injectable 1 milliGRAM(s) IntraMuscular once PRN Glucose LESS THAN 70 milligrams/deciliter      PMH/PSH/FH/SH: [ ] Unchanged    Vitals:  T(C): 36.4 (18 @ 07:50), Max: 36.8 (18 @ 13:00)  HR: 84 (18 @ 07:50) (77 - 121)  BP: 191/104 (18 @ 07:50) (105/61 - 191/104)  BP(mean): --  RR: 22 (18 @ 07:50) (20 - 28)  SpO2: 92% (18 @ 07:50) (92% - 97%)  Wt(kg): --  Daily     Daily Weight in k.1 (2018 04:42)  I&O's Summary      Labs:                        11.5   10.8  )-----------( 124      ( 2018 07:21 )             34.7         137  |  99  |  28<H>  ----------------------------<  310<H>  3.7   |  29  |  1.10    Ca    8.4<L>      2018 07:21  Phos  3.0       Mg     2.1         TPro  6.1  /  Alb  2.9<L>  /  TBili  0.6  /  DBili  x   /  AST  28  /  ALT  31  /  AlkPhos  67      PT/INR - ( 2018 07:21 )   PT: 37.5 sec;   INR: 3.36 ratio           CARDIAC MARKERS ( 2018 06:13 )  x     / x     / 206 U/L / x     / x          Interpretation of Telemetry: SR-ST  no acute events noted overnight       Physical Exam:  Appearance: [ ] Normal  [ ] abnormal [X ] NAD   Eyes: [ ] PERRL [ ] EOMI  HENT: [ ] Normal [ ] Abnormal oral mucosa [ ]NC/AT  Cardiovascular: [X ] S1 [X ] S2 [X ] trace B/L pedal edema +redness  Procedural Access Site: [ ]  hematoma [ ] tender to palpation [ ] 2+ pulse [ ] bruit [ ] Ecchymosis  Respiratory: [X ] Clear to auscultation bilaterally B/L upper lobes diminished breath B/L lower lobes   Gastrointestinal: [ ] Soft [ ] tenderness[ ] distension [ ] BS  Musculoskeletal: [ ] clubbing [ ] joint deformity   Neurologic: [ ] Non-focal  Lymphatic: [ ] lymphadenopathy  Psychiatry: [X ] AAOx3  [ ] confused [ ] disoriented [X ] Mood & affect appropriate  Skin: [ ]  rashes [ ] ecchymoses [ ] cyanosis

## 2018-01-29 NOTE — PROGRESS NOTE ADULT - ASSESSMENT
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  -  Appears more or less compensated from HF  Resumed Lasix 40mg PO qday  - Monitor strict I&Os and daily weights.    - No tele events on Metoprolol 50mg q12.   - BP  labile. -191 DBP  , will resume Norvasc   - AAA stable per vascular. Pt refusing surgery  - Trop elevation likely in setting of SAJAN. Not c/w plaque rupture. Cont ASA and statin.   - Dose Coumadin. Goal INR 2-3. INR 3.36  - Monitor and replete electrolytes. Keep K>4.0 and Mg>2.0.   - Further cardiac workup will depend on clinical course.   - All other workup per primary team.   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  -  Appears more or less compensated from HF  Resumed Lasix 40mg PO qday  - Monitor strict I&Os and daily weights.    - No tele events on Metoprolol 50mg q12.   - BP  labile. -191 DBP  , will resume Norvasc 5mg daily   - AAA stable per vascular. Pt refusing surgery  - Trop elevation likely in setting of SAJAN. Not c/w plaque rupture. Cont ASA and statin.   - Dose Coumadin. Goal INR 2-3. INR 3.36  - Monitor and replete electrolytes. Keep K>4.0 and Mg>2.0.   - Further cardiac workup will depend on clinical course.   - All other workup per primary team.   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  -  Resumed Lasix 40mg PO qday  - Monitor strict I&Os and daily weights.    - No tele events on Metoprolol 50mg q12.   - BP  labile. -191 DBP  , will resume Norvasc 5mg daily   - AAA stable per vascular. Pt refusing surgery  - Trop elevation likely in setting of SAJAN. Not c/w plaque rupture. Cont ASA and statin.   - Dose Coumadin. Goal INR 2-3. INR 3.36  - Monitor and replete electrolytes. Keep K>4.0 and Mg>2.0.   - Further cardiac workup will depend on clinical course.   - All other workup per primary team.   - Will  continue to followup

## 2018-01-30 VITALS
RESPIRATION RATE: 25 BRPM | TEMPERATURE: 98 F | SYSTOLIC BLOOD PRESSURE: 120 MMHG | DIASTOLIC BLOOD PRESSURE: 64 MMHG | HEART RATE: 87 BPM

## 2018-01-30 PROCEDURE — 83036 HEMOGLOBIN GLYCOSYLATED A1C: CPT

## 2018-01-30 PROCEDURE — 84100 ASSAY OF PHOSPHORUS: CPT

## 2018-01-30 PROCEDURE — 85610 PROTHROMBIN TIME: CPT

## 2018-01-30 PROCEDURE — 96367 TX/PROPH/DG ADDL SEQ IV INF: CPT

## 2018-01-30 PROCEDURE — 99285 EMERGENCY DEPT VISIT HI MDM: CPT | Mod: 25

## 2018-01-30 PROCEDURE — 93005 ELECTROCARDIOGRAM TRACING: CPT

## 2018-01-30 PROCEDURE — 94760 N-INVAS EAR/PLS OXIMETRY 1: CPT

## 2018-01-30 PROCEDURE — 83690 ASSAY OF LIPASE: CPT

## 2018-01-30 PROCEDURE — 80053 COMPREHEN METABOLIC PANEL: CPT

## 2018-01-30 PROCEDURE — 85730 THROMBOPLASTIN TIME PARTIAL: CPT

## 2018-01-30 PROCEDURE — 83880 ASSAY OF NATRIURETIC PEPTIDE: CPT

## 2018-01-30 PROCEDURE — 99239 HOSP IP/OBS DSCHRG MGMT >30: CPT

## 2018-01-30 PROCEDURE — 84484 ASSAY OF TROPONIN QUANT: CPT

## 2018-01-30 PROCEDURE — 36415 COLL VENOUS BLD VENIPUNCTURE: CPT

## 2018-01-30 PROCEDURE — 83605 ASSAY OF LACTIC ACID: CPT

## 2018-01-30 PROCEDURE — 94640 AIRWAY INHALATION TREATMENT: CPT

## 2018-01-30 PROCEDURE — 93970 EXTREMITY STUDY: CPT

## 2018-01-30 PROCEDURE — 87186 SC STD MICRODIL/AGAR DIL: CPT

## 2018-01-30 PROCEDURE — 82803 BLOOD GASES ANY COMBINATION: CPT

## 2018-01-30 PROCEDURE — 71045 X-RAY EXAM CHEST 1 VIEW: CPT

## 2018-01-30 PROCEDURE — 99232 SBSQ HOSP IP/OBS MODERATE 35: CPT

## 2018-01-30 PROCEDURE — 87798 DETECT AGENT NOS DNA AMP: CPT

## 2018-01-30 PROCEDURE — 80048 BASIC METABOLIC PNL TOTAL CA: CPT

## 2018-01-30 PROCEDURE — 87486 CHLMYD PNEUM DNA AMP PROBE: CPT

## 2018-01-30 PROCEDURE — 82248 BILIRUBIN DIRECT: CPT

## 2018-01-30 PROCEDURE — 71250 CT THORAX DX C-: CPT

## 2018-01-30 PROCEDURE — 83735 ASSAY OF MAGNESIUM: CPT

## 2018-01-30 PROCEDURE — 87633 RESP VIRUS 12-25 TARGETS: CPT

## 2018-01-30 PROCEDURE — 82550 ASSAY OF CK (CPK): CPT

## 2018-01-30 PROCEDURE — 96375 TX/PRO/DX INJ NEW DRUG ADDON: CPT

## 2018-01-30 PROCEDURE — 87150 DNA/RNA AMPLIFIED PROBE: CPT

## 2018-01-30 PROCEDURE — 87040 BLOOD CULTURE FOR BACTERIA: CPT

## 2018-01-30 PROCEDURE — 87899 AGENT NOS ASSAY W/OPTIC: CPT

## 2018-01-30 PROCEDURE — 84145 PROCALCITONIN (PCT): CPT

## 2018-01-30 PROCEDURE — 87581 M.PNEUMON DNA AMP PROBE: CPT

## 2018-01-30 PROCEDURE — 96365 THER/PROPH/DIAG IV INF INIT: CPT

## 2018-01-30 PROCEDURE — 82962 GLUCOSE BLOOD TEST: CPT

## 2018-01-30 PROCEDURE — 97162 PT EVAL MOD COMPLEX 30 MIN: CPT

## 2018-01-30 PROCEDURE — 85027 COMPLETE CBC AUTOMATED: CPT

## 2018-01-30 RX ORDER — LOSARTAN POTASSIUM 100 MG/1
1 TABLET, FILM COATED ORAL
Qty: 0 | Refills: 0 | COMMUNITY
Start: 2018-01-30

## 2018-01-30 RX ORDER — PEN NEEDLE, DIABETIC 29 G X1/2"
31G X 5 MM NEEDLE, DISPOSABLE MISCELLANEOUS
Qty: 100 | Refills: 0 | Status: ACTIVE | COMMUNITY
Start: 2017-05-08 | End: 1900-01-01

## 2018-01-30 RX ORDER — LOSARTAN POTASSIUM 100 MG/1
50 TABLET, FILM COATED ORAL DAILY
Qty: 0 | Refills: 0 | Status: DISCONTINUED | OUTPATIENT
Start: 2018-01-31 | End: 2018-01-30

## 2018-01-30 RX ORDER — LOSARTAN POTASSIUM 100 MG/1
25 TABLET, FILM COATED ORAL ONCE
Qty: 0 | Refills: 0 | Status: COMPLETED | OUTPATIENT
Start: 2018-01-30 | End: 2018-01-30

## 2018-01-30 RX ADMIN — Medication 30 MILLIGRAM(S): at 05:51

## 2018-01-30 RX ADMIN — Medication 50 MICROGRAM(S): at 05:52

## 2018-01-30 RX ADMIN — Medication 3 MILLILITER(S): at 01:23

## 2018-01-30 RX ADMIN — Medication 3 MILLILITER(S): at 11:33

## 2018-01-30 RX ADMIN — Medication 0.5 MILLIGRAM(S): at 07:43

## 2018-01-30 RX ADMIN — PANTOPRAZOLE SODIUM 40 MILLIGRAM(S): 20 TABLET, DELAYED RELEASE ORAL at 05:51

## 2018-01-30 RX ADMIN — Medication 8: at 07:55

## 2018-01-30 RX ADMIN — AMLODIPINE BESYLATE 5 MILLIGRAM(S): 2.5 TABLET ORAL at 05:52

## 2018-01-30 RX ADMIN — Medication 50 MILLIGRAM(S): at 05:52

## 2018-01-30 RX ADMIN — Medication 40 MILLIGRAM(S): at 05:52

## 2018-01-30 RX ADMIN — Medication 3 MILLILITER(S): at 07:43

## 2018-01-30 RX ADMIN — Medication 3 MILLILITER(S): at 05:12

## 2018-01-30 RX ADMIN — LOSARTAN POTASSIUM 25 MILLIGRAM(S): 100 TABLET, FILM COATED ORAL at 11:33

## 2018-01-30 RX ADMIN — Medication 8: at 11:33

## 2018-01-30 RX ADMIN — Medication 20 MILLIGRAM(S): at 05:52

## 2018-01-30 RX ADMIN — LOSARTAN POTASSIUM 25 MILLIGRAM(S): 100 TABLET, FILM COATED ORAL at 05:52

## 2018-01-30 RX ADMIN — Medication 20 MILLIGRAM(S): at 11:33

## 2018-01-30 NOTE — PROGRESS NOTE ADULT - ASSESSMENT
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 I think sx are likely from flu and COPD exacerbation. I would cont pulm RX  -  Appears more or less Compensated from HF POV. Cont Lasix 40mg PO qday  - Monitor strict I&Os and daily weights.    - No tele events on Metoprolol 50mg q12.   - BP  labile. Still elevated. I would like to increase losartan 50mg Qday  - AAA stable per vascular. Pt refusing surgery  - Trop elevation likely in setting of SAJAN. Not c/w plaque rupture. Cont ASA and statin.   - Dose Coumadin. Goal INR 2-3. INR 1.9 today slightly subtherapeutic.   - Monitor and replete electrolytes. Keep K>4.0 and Mg>2.0.   - Further cardiac workup will depend on clinical course.   - All other workup per primary team. Will followup.

## 2018-01-30 NOTE — PROGRESS NOTE ADULT - PROVIDER SPECIALTY LIST ADULT
Cardiology
Hospitalist
Pulmonology
Cardiology
Hospitalist

## 2018-01-30 NOTE — PROGRESS NOTE ADULT - SUBJECTIVE AND OBJECTIVE BOX
[facility] -- Deann Kelly, Nelly Gibson, Genaro Dalton Savella  Office # [phone]      Follow Up:  dyspnea, HF, flu, edema    Subjective/Observations: Patient seen and examined. Events noted. Resting comfortably in bed. Dyspnea improved. C/O sore throat. No complaints of chest pain,  or palpitations reported. No signs of orthopnea or PND.       REVIEW OF SYSTEMS: All other review of systems is negative unless indicated above    PAST MEDICAL & SURGICAL HISTORY:  Type 2 diabetes mellitus  CHF (congestive heart failure)  Insulin dependent diabetes mellitus  HLD (hyperlipidemia)  HTN (hypertension)  COPD (chronic obstructive pulmonary disease)  Emphysema  DVT (deep venous thrombosis)  No significant past surgical history      MEDICATIONS  (STANDING):  ALBUTerol/ipratropium for Nebulization 3 milliLiter(s) Nebulizer every 4 hours  amLODIPine   Tablet 5 milliGRAM(s) Oral daily  atorvastatin 20 milliGRAM(s) Oral at bedtime  buDESOnide   0.5 milliGRAM(s) Respule 0.5 milliGRAM(s) Inhalation two times a day  dextrose 5%. 1000 milliLiter(s) (50 mL/Hr) IV Continuous <Continuous>  dextrose 50% Injectable 12.5 Gram(s) IV Push once  dextrose 50% Injectable 25 Gram(s) IV Push once  dextrose 50% Injectable 25 Gram(s) IV Push once  furosemide    Tablet 40 milliGRAM(s) Oral daily  insulin glargine Injectable (LANTUS) 10 Unit(s) SubCutaneous at bedtime  insulin lispro (HumaLOG) corrective regimen sliding scale   SubCutaneous three times a day before meals  levothyroxine 50 MICROGram(s) Oral daily  losartan 25 milliGRAM(s) Oral daily  metoprolol     tartrate 50 milliGRAM(s) Oral two times a day  oseltamivir 30 milliGRAM(s) Oral every 12 hours  pantoprazole    Tablet 40 milliGRAM(s) Oral before breakfast  predniSONE   Tablet 20 milliGRAM(s) Oral three times a day    MEDICATIONS  (PRN):  dextrose Gel 1 Dose(s) Oral once PRN Blood Glucose LESS THAN 70 milliGRAM(s)/deciliter  glucagon  Injectable 1 milliGRAM(s) IntraMuscular once PRN Glucose LESS THAN 70 milligrams/deciliter      Allergies    No Known Allergies    Intolerances            Vital Signs Last 24 Hrs  T(C): 36.5 (30 Jan 2018 08:01), Max: 36.6 (29 Jan 2018 16:26)  T(F): 97.7 (30 Jan 2018 08:01), Max: 97.9 (29 Jan 2018 16:26)  HR: 88 (30 Jan 2018 08:01) (70 - 91)  BP: 198/91 (30 Jan 2018 08:01) (136/77 - 198/91)  BP(mean): --  RR: 20 (30 Jan 2018 04:37) (20 - 23)  SpO2: 96% (30 Jan 2018 08:01) (87% - 100%)    I&O's Summary    30 Jan 2018 07:01  -  30 Jan 2018 09:21  --------------------------------------------------------  IN: 240 mL / OUT: 0 mL / NET: 240 mL          PHYSICAL EXAM:  TELE:    Constitutional: NAD, awake and alert,  HEENT: Moist Mucous Membranes, Anicteric  Pulmonary: Decreased breath sounds b/l. No rales, crackles or wheeze appreciated.   Cardiovascular: Regular, S1 and S2, distant   Gastrointestinal: Bowel Sounds present, soft, nontender.   Lymph: 1-2 + lower ext edema  Skin: cellulitic changes b/l lower ext (improved)  Psych:  Mood & affect appropriate    LABS: All Labs Reviewed:                        11.5   10.8  )-----------( 124      ( 29 Jan 2018 07:21 )             34.7                         11.4   12.0  )-----------( 136      ( 28 Jan 2018 06:13 )             35.8     29 Jan 2018 07:21    137    |  99     |  28     ----------------------------<  310    3.7     |  29     |  1.10   28 Jan 2018 06:13    142    |  105    |  24     ----------------------------<  288    3.7     |  27     |  0.98     Ca    8.4        29 Jan 2018 07:21  Ca    8.7        28 Jan 2018 06:13  Phos  3.0       28 Jan 2018 06:13  Mg     2.1       28 Jan 2018 06:13    TPro  6.1    /  Alb  2.9    /  TBili  0.6    /  DBili  x      /  AST  28     /  ALT  31     /  AlkPhos  67     29 Jan 2018 07:21  TPro  6.3    /  Alb  2.9    /  TBili  0.3    /  DBili  .10    /  AST  36     /  ALT  30     /  AlkPhos  67     28 Jan 2018 06:13    PT/INR - ( 29 Jan 2018 07:21 )   PT: 37.5 sec;   INR: 3.36 ratio

## 2018-01-30 NOTE — PROGRESS NOTE ADULT - SUBJECTIVE AND OBJECTIVE BOX
VITALS/LABS  1/30/2018 afeb 87 120/60 25   1/29/2018 afeb 80 130/60 20 97% 85  1/29/2018 W 10.8 Hb 11.5 Plt 124 INR 3.36 Na 137 K 3.7 CO2 29 Cr 1.1   HOSPITAL COURSE PROBLEM ASSESSMENT PLAN 1/25/2018 ADMISSION [facility]        RESP   1/26 vbg pH 746         1/27/2018 6p 5l 742/31/69          1/28 vbg pH 737                                         FLU  1/25 AH3 Positive   Oseltamivir 30.2.5d (1/26)   POSSIBLE PNEUMONIA  1/25/2018 CXR Non sp chr bibasal IS prominence similar to 10/4/2017 azithro (1/25-1/26) rocephin (1/25-1/26)   1/25 CT ch CW 9/26/2016 No pneumonia pulm edema or dominant masses Mild basl ssa juxtarenal aaa 6.5 x 6.2 cm   1/26 pct .06  1/28-1/29 W 12 - 10.8  1/27/2018 CXR No gross focal infiltrate Disc atelectasis R base     LACTICEMIA SEC SEPSIS V d CHF  1/25-1/26-1/26-1/26-1/27-1/28 LA 3.1 -5.1-2.2-6.5-7.8 -2.1   1/25/2018 NS 2.5 l ordered (1/25 12p)   1/25 Monitor serially  1/27 Etiology of LA not very clear   RECURRENT VTE  1/25    Warfarin (1/25)     COPD   Duoneb.4 (1/25) --> Duoneb.6 (1/26) Solumed 40.3 (1/25) --> Pred 20.3 (1/28)  pulmicort (1/25)   CAD  Atorvastat 20 (1/25) Metoprolol 50.2 (1/27)   1/26 Tr 1 .144 (i)   CHF  10/6/2017 ECHO   EF 55 RVSP 52   Lasix 40 (1/27) losartan 25 (1/28)   HYPOTHYROIDISM   Levoxyl 50 (1/25)   AAA  1/26 CT ch justarenal 6.5x6.2  AAA  1/26 Consider vasc eval surg  DM   Lantus 10 (1/26) iss (1/26)   GLOBAL ISSUE/BEST PRACTICE:        PROBLEM: Analgesia:     na                        PROBLEM: Sedation:     na               PROBLEM: HOB elevation:   y             PROBLEM: Stress ulcer proph:    protonix 40 (1/25)                       PROBLEM: VTE prophylaxis:      na                  PROBLEM: Glycemic control:    iss (1/25)    PROBLEM: Nutrition:    consistent carb MOODY (1/25)           PROBLEM: Advanced directive: na     PROBLEM: Allergies:  na      TIME SPENT Over 25 minutes aggregate care time spent on encounter; activities included   direct patient care, counseling and/or coordinating care reviewing notes, lab data/ imaging , discussion with multidisciplinary team/ patient  /family. Risks, benefits, alternatives  discussed in detail. Proper antibiotic use issues addressed Questions/concerns  were addressed  as  best as possible   SUMMARY ASSESSMENT PLAN 1/25/2018  ADMISSION [facility]   81 f former smoker HO recurrent DVT HO coumadin coagulopaythy 9/2016 required PCC HO Cardioseptic shock 9/2016 poss Takosubo 9/2016 HO ADHF and COPD Ex 10/2017 admitted [facility] 1/25/2018 with SOB and leg edema Ruled in positivce for flu  FLU Tamiflu (1/25)   PNEUMONIA 1/26 pct .06Azithro (1/25-1/26) Rocephin (1/25-1/26)   LACTICEMIA FC (1/25) Optimization of COPD and CHF   COPD BD CS (1/25)

## 2018-01-31 LAB
-  AMPICILLIN/SULBACTAM: SIGNIFICANT CHANGE UP
-  CEFAZOLIN: SIGNIFICANT CHANGE UP
-  CIPROFLOXACIN: SIGNIFICANT CHANGE UP
-  CLINDAMYCIN: SIGNIFICANT CHANGE UP
-  ERYTHROMYCIN: SIGNIFICANT CHANGE UP
-  GENTAMICIN: SIGNIFICANT CHANGE UP
-  LEVOFLOXACIN: SIGNIFICANT CHANGE UP
-  MOXIFLOXACIN(AEROBIC): SIGNIFICANT CHANGE UP
-  OXACILLIN: SIGNIFICANT CHANGE UP
-  RIFAMPIN: SIGNIFICANT CHANGE UP
-  TETRACYCLINE: SIGNIFICANT CHANGE UP
-  TRIMETHOPRIM/SULFAMETHOXAZOLE: SIGNIFICANT CHANGE UP
-  VANCOMYCIN: SIGNIFICANT CHANGE UP
CULTURE RESULTS: SIGNIFICANT CHANGE UP
METHOD TYPE: SIGNIFICANT CHANGE UP
ORGANISM # SPEC MICROSCOPIC CNT: SIGNIFICANT CHANGE UP
ORGANISM # SPEC MICROSCOPIC CNT: SIGNIFICANT CHANGE UP
SPECIMEN SOURCE: SIGNIFICANT CHANGE UP

## 2018-02-02 LAB
CULTURE RESULTS: SIGNIFICANT CHANGE UP
SPECIMEN SOURCE: SIGNIFICANT CHANGE UP

## 2018-02-05 ENCOUNTER — RX RENEWAL (OUTPATIENT)
Age: 83
End: 2018-02-05

## 2018-03-19 ENCOUNTER — CLINICAL ADVICE (OUTPATIENT)
Age: 83
End: 2018-03-19

## 2018-04-08 ENCOUNTER — EMERGENCY (EMERGENCY)
Facility: HOSPITAL | Age: 83
LOS: 1 days | Discharge: HOME | End: 2018-04-08
Attending: EMERGENCY MEDICINE | Admitting: EMERGENCY MEDICINE
Payer: MEDICARE

## 2018-04-08 VITALS
SYSTOLIC BLOOD PRESSURE: 118 MMHG | DIASTOLIC BLOOD PRESSURE: 62 MMHG | TEMPERATURE: 98 F | RESPIRATION RATE: 18 BRPM | HEART RATE: 100 BPM | OXYGEN SATURATION: 96 %

## 2018-04-08 VITALS
OXYGEN SATURATION: 91 % | HEART RATE: 112 BPM | HEIGHT: 65 IN | DIASTOLIC BLOOD PRESSURE: 85 MMHG | SYSTOLIC BLOOD PRESSURE: 131 MMHG | TEMPERATURE: 98 F | RESPIRATION RATE: 20 BRPM | WEIGHT: 184.09 LBS

## 2018-04-08 DIAGNOSIS — R04.0 EPISTAXIS: ICD-10-CM

## 2018-04-08 LAB
APTT BLD: 27.2 SEC — LOW (ref 27.5–37.4)
BASOPHILS # BLD AUTO: 0.1 K/UL — SIGNIFICANT CHANGE UP (ref 0–0.2)
BASOPHILS NFR BLD AUTO: 0.6 % — SIGNIFICANT CHANGE UP (ref 0–2)
EOSINOPHIL # BLD AUTO: 0.1 K/UL — SIGNIFICANT CHANGE UP (ref 0–0.5)
EOSINOPHIL NFR BLD AUTO: 1.2 % — SIGNIFICANT CHANGE UP (ref 0–6)
HCT VFR BLD CALC: 39.3 % — SIGNIFICANT CHANGE UP (ref 34.5–45)
HGB BLD-MCNC: 12.9 G/DL — SIGNIFICANT CHANGE UP (ref 11.5–15.5)
INR BLD: 2.1 RATIO — HIGH (ref 0.88–1.16)
LYMPHOCYTES # BLD AUTO: 2.1 K/UL — SIGNIFICANT CHANGE UP (ref 1–3.3)
LYMPHOCYTES # BLD AUTO: 20 % — SIGNIFICANT CHANGE UP (ref 13–44)
MCHC RBC-ENTMCNC: 29.7 PG — SIGNIFICANT CHANGE UP (ref 27–34)
MCHC RBC-ENTMCNC: 32.7 GM/DL — SIGNIFICANT CHANGE UP (ref 32–36)
MCV RBC AUTO: 90.9 FL — SIGNIFICANT CHANGE UP (ref 80–100)
MONOCYTES # BLD AUTO: 0.4 K/UL — SIGNIFICANT CHANGE UP (ref 0–0.9)
MONOCYTES NFR BLD AUTO: 4 % — SIGNIFICANT CHANGE UP (ref 1–9)
NEUTROPHILS # BLD AUTO: 7.7 K/UL — HIGH (ref 1.8–7.4)
NEUTROPHILS NFR BLD AUTO: 74.2 % — SIGNIFICANT CHANGE UP (ref 43–77)
PLATELET # BLD AUTO: 157 K/UL — SIGNIFICANT CHANGE UP (ref 150–400)
PROTHROM AB SERPL-ACNC: 23.2 SEC — HIGH (ref 9.8–12.7)
RBC # BLD: 4.33 M/UL — SIGNIFICANT CHANGE UP (ref 3.8–5.2)
RBC # FLD: 15.1 % — HIGH (ref 10.3–14.5)
WBC # BLD: 10.4 K/UL — SIGNIFICANT CHANGE UP (ref 3.8–10.5)
WBC # FLD AUTO: 10.4 K/UL — SIGNIFICANT CHANGE UP (ref 3.8–10.5)

## 2018-04-08 PROCEDURE — 99283 EMERGENCY DEPT VISIT LOW MDM: CPT | Mod: 25

## 2018-04-08 PROCEDURE — 99284 EMERGENCY DEPT VISIT MOD MDM: CPT

## 2018-04-08 PROCEDURE — 85027 COMPLETE CBC AUTOMATED: CPT

## 2018-04-08 PROCEDURE — 71045 X-RAY EXAM CHEST 1 VIEW: CPT | Mod: 26

## 2018-04-08 PROCEDURE — 85730 THROMBOPLASTIN TIME PARTIAL: CPT

## 2018-04-08 PROCEDURE — 71045 X-RAY EXAM CHEST 1 VIEW: CPT

## 2018-04-08 PROCEDURE — 85610 PROTHROMBIN TIME: CPT

## 2018-04-08 RX ORDER — ENOXAPARIN SODIUM 100 MG/ML
10 INJECTION SUBCUTANEOUS
Qty: 0 | Refills: 0 | COMMUNITY

## 2018-04-08 RX ORDER — WARFARIN SODIUM 2.5 MG/1
1 TABLET ORAL
Qty: 0 | Refills: 0 | COMMUNITY

## 2018-04-08 RX ORDER — WARFARIN SODIUM 2.5 MG/1
2 TABLET ORAL
Qty: 0 | Refills: 0 | COMMUNITY

## 2018-04-08 RX ORDER — OXYMETAZOLINE HYDROCHLORIDE 0.5 MG/ML
2 SPRAY NASAL ONCE
Qty: 0 | Refills: 0 | Status: DISCONTINUED | OUTPATIENT
Start: 2018-04-08 | End: 2018-04-12

## 2018-04-08 NOTE — ED PROVIDER NOTE - OBJECTIVE STATEMENT
81 yo female sent from [facility] for epistaxis. Patient with off/on epistaxis since yesterday  hx of same in past. INR was 4 at NH. patient denies any fall  denies any other complaints  denies cp or sob. denies dizziness or weakness

## 2018-04-08 NOTE — ED ADULT TRIAGE NOTE - CHIEF COMPLAINT QUOTE
as per ems "patient was sent here because of a nose bleed and now the blood is in her sputum" pt presents as per ems "patient was sent here because of a nose bleed and now the blood is in her sputum" . nose bleed subsided at this time. patient denies trauma, sob, chest pain, dizziness, nausea, vomiting. patient is on continuos oxygen 4L at facility

## 2018-04-08 NOTE — ED PROVIDER NOTE - PMH
Abdominal aortic aneurysm (AAA) without rupture    Acute embolism and thombos unsp deep vn unsp lower extremity    Anxiety disorder due to known physiological condition    CHF (congestive heart failure)    COPD (chronic obstructive pulmonary disease)    Diabetes mellitus    Diarrhea    DVT (deep venous thrombosis)    Edema    Emphysema    GERD (gastroesophageal reflux disease)    HLD (hyperlipidemia)    HTN (hypertension)  essential primary HTN  Hypokalemia    Hypothyroidism    Insulin dependent diabetes mellitus    Muscle weakness (generalized)    Type 2 diabetes mellitus

## 2018-04-08 NOTE — ED ADULT NURSE NOTE - OBJECTIVE STATEMENT
Pt from [facility] c/o epistaxis, pt's PT/INR level was elevated as reported by the facility and nose bleed began this morning. Pt is on comfort care and  from facility requested only CBC & PT/INR. Pt is alert and oriented with bilateral bruising on forearms from constant blood draws at facility, skin intact, afebrile, will continue to monitor

## 2018-04-08 NOTE — ED PROVIDER NOTE - PROGRESS NOTE DETAILS
no active bleeding in ED  INR 2.1  d/w physician at [facility], patient to be returned

## 2018-04-08 NOTE — ED ADULT NURSE NOTE - CHIEF COMPLAINT QUOTE
as per ems "patient was sent here because of a nose bleed and now the blood is in her sputum" . nose bleed subsided at this time. patient denies trauma, sob, chest pain, dizziness, nausea, vomiting. patient is on continuos oxygen 4L at facility

## 2018-04-08 NOTE — ED ADULT NURSE NOTE - PMH
Acute embolism and thombos unsp deep vn unsp lower extremity    CHF (congestive heart failure)    COPD (chronic obstructive pulmonary disease)    DVT (deep venous thrombosis)    Emphysema    HLD (hyperlipidemia)    HTN (hypertension)  essential primary HTN  Insulin dependent diabetes mellitus    Muscle weakness (generalized)    Type 2 diabetes mellitus Abdominal aortic aneurysm (AAA) without rupture    Acute embolism and thombos unsp deep vn unsp lower extremity    Anxiety disorder due to known physiological condition    CHF (congestive heart failure)    COPD (chronic obstructive pulmonary disease)    Diabetes mellitus    Diarrhea    DVT (deep venous thrombosis)    Edema    Emphysema    GERD (gastroesophageal reflux disease)    HLD (hyperlipidemia)    HTN (hypertension)  essential primary HTN  Hypokalemia    Hypothyroidism    Insulin dependent diabetes mellitus    Muscle weakness (generalized)    Type 2 diabetes mellitus

## 2018-05-06 ENCOUNTER — INPATIENT (INPATIENT)
Facility: HOSPITAL | Age: 83
LOS: 0 days | DRG: 951 | End: 2018-05-07
Attending: HOSPITALIST | Admitting: HOSPITALIST
Payer: MEDICARE

## 2018-05-06 VITALS
RESPIRATION RATE: 17 BRPM | SYSTOLIC BLOOD PRESSURE: 91 MMHG | DIASTOLIC BLOOD PRESSURE: 59 MMHG | HEART RATE: 103 BPM | OXYGEN SATURATION: 90 % | TEMPERATURE: 98 F

## 2018-05-06 VITALS
WEIGHT: 293 LBS | HEART RATE: 96 BPM | TEMPERATURE: 98 F | OXYGEN SATURATION: 93 % | RESPIRATION RATE: 15 BRPM | SYSTOLIC BLOOD PRESSURE: 96 MMHG | HEIGHT: 60 IN | DIASTOLIC BLOOD PRESSURE: 58 MMHG

## 2018-05-06 DIAGNOSIS — D72.829 ELEVATED WHITE BLOOD CELL COUNT, UNSPECIFIED: ICD-10-CM

## 2018-05-06 DIAGNOSIS — Z29.9 ENCOUNTER FOR PROPHYLACTIC MEASURES, UNSPECIFIED: ICD-10-CM

## 2018-05-06 DIAGNOSIS — E11.9 TYPE 2 DIABETES MELLITUS WITHOUT COMPLICATIONS: ICD-10-CM

## 2018-05-06 DIAGNOSIS — I71.4 ABDOMINAL AORTIC ANEURYSM, WITHOUT RUPTURE: ICD-10-CM

## 2018-05-06 DIAGNOSIS — Z71.89 OTHER SPECIFIED COUNSELING: ICD-10-CM

## 2018-05-06 DIAGNOSIS — R31.9 HEMATURIA, UNSPECIFIED: ICD-10-CM

## 2018-05-06 DIAGNOSIS — I71.3 ABDOMINAL AORTIC ANEURYSM, RUPTURED: ICD-10-CM

## 2018-05-06 DIAGNOSIS — N17.9 ACUTE KIDNEY FAILURE, UNSPECIFIED: ICD-10-CM

## 2018-05-06 DIAGNOSIS — J44.9 CHRONIC OBSTRUCTIVE PULMONARY DISEASE, UNSPECIFIED: ICD-10-CM

## 2018-05-06 LAB
ALBUMIN SERPL ELPH-MCNC: 2.7 G/DL — LOW (ref 3.3–5)
ALP SERPL-CCNC: 72 U/L — SIGNIFICANT CHANGE UP (ref 40–120)
ALT FLD-CCNC: 40 U/L — SIGNIFICANT CHANGE UP (ref 12–78)
ANION GAP SERPL CALC-SCNC: 11 MMOL/L — SIGNIFICANT CHANGE UP (ref 5–17)
APPEARANCE UR: CLEAR — SIGNIFICANT CHANGE UP
APTT BLD: 26.3 SEC — LOW (ref 27.5–37.4)
AST SERPL-CCNC: 28 U/L — SIGNIFICANT CHANGE UP (ref 15–37)
BASOPHILS # BLD AUTO: 0.1 K/UL — SIGNIFICANT CHANGE UP (ref 0–0.2)
BASOPHILS NFR BLD AUTO: 0.7 % — SIGNIFICANT CHANGE UP (ref 0–2)
BILIRUB SERPL-MCNC: 1 MG/DL — SIGNIFICANT CHANGE UP (ref 0.2–1.2)
BILIRUB UR-MCNC: NEGATIVE — SIGNIFICANT CHANGE UP
BUN SERPL-MCNC: 59 MG/DL — HIGH (ref 7–23)
CALCIUM SERPL-MCNC: 8.5 MG/DL — SIGNIFICANT CHANGE UP (ref 8.5–10.1)
CHLORIDE SERPL-SCNC: 96 MMOL/L — SIGNIFICANT CHANGE UP (ref 96–108)
CO2 SERPL-SCNC: 30 MMOL/L — SIGNIFICANT CHANGE UP (ref 22–31)
COLOR SPEC: YELLOW — SIGNIFICANT CHANGE UP
CREAT SERPL-MCNC: 2 MG/DL — HIGH (ref 0.5–1.3)
DIFF PNL FLD: ABNORMAL
EOSINOPHIL # BLD AUTO: 0 K/UL — SIGNIFICANT CHANGE UP (ref 0–0.5)
EOSINOPHIL NFR BLD AUTO: 0.1 % — SIGNIFICANT CHANGE UP (ref 0–6)
GLUCOSE SERPL-MCNC: 146 MG/DL — HIGH (ref 70–99)
GLUCOSE UR QL: NEGATIVE — SIGNIFICANT CHANGE UP
HCT VFR BLD CALC: 41.8 % — SIGNIFICANT CHANGE UP (ref 34.5–45)
HGB BLD-MCNC: 14.2 G/DL — SIGNIFICANT CHANGE UP (ref 11.5–15.5)
INR BLD: 1.75 RATIO — HIGH (ref 0.88–1.16)
KETONES UR-MCNC: NEGATIVE — SIGNIFICANT CHANGE UP
LACTATE SERPL-SCNC: 3.6 MMOL/L — HIGH (ref 0.7–2)
LEUKOCYTE ESTERASE UR-ACNC: ABNORMAL
LIDOCAIN IGE QN: 58 U/L — LOW (ref 73–393)
LYMPHOCYTES # BLD AUTO: 18.5 % — SIGNIFICANT CHANGE UP (ref 13–44)
LYMPHOCYTES # BLD AUTO: 3.9 K/UL — HIGH (ref 1–3.3)
MCHC RBC-ENTMCNC: 31.1 PG — SIGNIFICANT CHANGE UP (ref 27–34)
MCHC RBC-ENTMCNC: 33.9 GM/DL — SIGNIFICANT CHANGE UP (ref 32–36)
MCV RBC AUTO: 91.8 FL — SIGNIFICANT CHANGE UP (ref 80–100)
MONOCYTES # BLD AUTO: 0.5 K/UL — SIGNIFICANT CHANGE UP (ref 0–0.9)
MONOCYTES NFR BLD AUTO: 2.5 % — SIGNIFICANT CHANGE UP (ref 1–9)
NEUTROPHILS # BLD AUTO: 16.5 K/UL — HIGH (ref 1.8–7.4)
NEUTROPHILS NFR BLD AUTO: 78.2 % — HIGH (ref 43–77)
NITRITE UR-MCNC: NEGATIVE — SIGNIFICANT CHANGE UP
PH UR: 9 — HIGH (ref 5–8)
PLATELET # BLD AUTO: 159 K/UL — SIGNIFICANT CHANGE UP (ref 150–400)
POTASSIUM SERPL-MCNC: 4.8 MMOL/L — SIGNIFICANT CHANGE UP (ref 3.5–5.3)
POTASSIUM SERPL-SCNC: 4.8 MMOL/L — SIGNIFICANT CHANGE UP (ref 3.5–5.3)
PROT SERPL-MCNC: 6.3 G/DL — SIGNIFICANT CHANGE UP (ref 6–8.3)
PROT UR-MCNC: 25 MG/DL
PROTHROM AB SERPL-ACNC: 19.3 SEC — HIGH (ref 9.8–12.7)
RBC # BLD: 4.56 M/UL — SIGNIFICANT CHANGE UP (ref 3.8–5.2)
RBC # FLD: 15.6 % — HIGH (ref 10.3–14.5)
SODIUM SERPL-SCNC: 137 MMOL/L — SIGNIFICANT CHANGE UP (ref 135–145)
SP GR SPEC: 1.01 — SIGNIFICANT CHANGE UP (ref 1.01–1.02)
UROBILINOGEN FLD QL: NEGATIVE — SIGNIFICANT CHANGE UP
WBC # BLD: 21.1 K/UL — HIGH (ref 3.8–10.5)
WBC # FLD AUTO: 21.1 K/UL — HIGH (ref 3.8–10.5)

## 2018-05-06 PROCEDURE — 87086 URINE CULTURE/COLONY COUNT: CPT

## 2018-05-06 PROCEDURE — 99291 CRITICAL CARE FIRST HOUR: CPT

## 2018-05-06 PROCEDURE — 96374 THER/PROPH/DIAG INJ IV PUSH: CPT

## 2018-05-06 PROCEDURE — 81001 URINALYSIS AUTO W/SCOPE: CPT

## 2018-05-06 PROCEDURE — 85730 THROMBOPLASTIN TIME PARTIAL: CPT

## 2018-05-06 PROCEDURE — 96375 TX/PRO/DX INJ NEW DRUG ADDON: CPT

## 2018-05-06 PROCEDURE — 99223 1ST HOSP IP/OBS HIGH 75: CPT | Mod: AI,GC

## 2018-05-06 PROCEDURE — 85610 PROTHROMBIN TIME: CPT

## 2018-05-06 PROCEDURE — 83690 ASSAY OF LIPASE: CPT

## 2018-05-06 PROCEDURE — 99497 ADVNCD CARE PLAN 30 MIN: CPT | Mod: 25

## 2018-05-06 PROCEDURE — 85027 COMPLETE CBC AUTOMATED: CPT

## 2018-05-06 PROCEDURE — 99291 CRITICAL CARE FIRST HOUR: CPT | Mod: 25

## 2018-05-06 PROCEDURE — 74177 CT ABD & PELVIS W/CONTRAST: CPT | Mod: 26

## 2018-05-06 PROCEDURE — 83605 ASSAY OF LACTIC ACID: CPT

## 2018-05-06 PROCEDURE — 80053 COMPREHEN METABOLIC PANEL: CPT

## 2018-05-06 PROCEDURE — 74177 CT ABD & PELVIS W/CONTRAST: CPT

## 2018-05-06 RX ORDER — GABAPENTIN 400 MG/1
1 CAPSULE ORAL
Qty: 0 | Refills: 0 | COMMUNITY

## 2018-05-06 RX ORDER — ENOXAPARIN SODIUM 100 MG/ML
40 INJECTION SUBCUTANEOUS
Qty: 0 | Refills: 0 | COMMUNITY

## 2018-05-06 RX ORDER — POLYETHYLENE GLYCOL 3350 17 G/17G
17 POWDER, FOR SOLUTION ORAL
Qty: 0 | Refills: 0 | COMMUNITY

## 2018-05-06 RX ORDER — LEVOTHYROXINE SODIUM 125 MCG
1 TABLET ORAL
Qty: 0 | Refills: 0 | COMMUNITY

## 2018-05-06 RX ORDER — MORPHINE SULFATE 50 MG/1
2 CAPSULE, EXTENDED RELEASE ORAL EVERY 4 HOURS
Qty: 0 | Refills: 0 | Status: DISCONTINUED | OUTPATIENT
Start: 2018-05-06 | End: 2018-05-07

## 2018-05-06 RX ORDER — SODIUM CHLORIDE 9 MG/ML
1000 INJECTION INTRAMUSCULAR; INTRAVENOUS; SUBCUTANEOUS ONCE
Qty: 0 | Refills: 0 | Status: COMPLETED | OUTPATIENT
Start: 2018-05-06 | End: 2018-05-06

## 2018-05-06 RX ORDER — ONDANSETRON 8 MG/1
4 TABLET, FILM COATED ORAL ONCE
Qty: 0 | Refills: 0 | Status: DISCONTINUED | OUTPATIENT
Start: 2018-05-06 | End: 2018-05-07

## 2018-05-06 RX ORDER — ONDANSETRON 8 MG/1
4 TABLET, FILM COATED ORAL EVERY 8 HOURS
Qty: 0 | Refills: 0 | Status: DISCONTINUED | OUTPATIENT
Start: 2018-05-06 | End: 2018-05-07

## 2018-05-06 RX ORDER — ACETAMINOPHEN 500 MG
650 TABLET ORAL EVERY 6 HOURS
Qty: 0 | Refills: 0 | Status: DISCONTINUED | OUTPATIENT
Start: 2018-05-06 | End: 2018-05-07

## 2018-05-06 RX ORDER — MORPHINE SULFATE 50 MG/1
2 CAPSULE, EXTENDED RELEASE ORAL EVERY 6 HOURS
Qty: 0 | Refills: 0 | Status: DISCONTINUED | OUTPATIENT
Start: 2018-05-06 | End: 2018-05-07

## 2018-05-06 RX ORDER — BUDESONIDE, MICRONIZED 100 %
2 POWDER (GRAM) MISCELLANEOUS
Qty: 0 | Refills: 0 | COMMUNITY

## 2018-05-06 RX ORDER — ENOXAPARIN SODIUM 100 MG/ML
30 INJECTION SUBCUTANEOUS
Qty: 0 | Refills: 0 | COMMUNITY

## 2018-05-06 RX ORDER — SODIUM CHLORIDE 9 MG/ML
1000 INJECTION, SOLUTION INTRAVENOUS
Qty: 0 | Refills: 0 | Status: DISCONTINUED | OUTPATIENT
Start: 2018-05-06 | End: 2018-05-07

## 2018-05-06 RX ORDER — AMLODIPINE BESYLATE 2.5 MG/1
1 TABLET ORAL
Qty: 0 | Refills: 0 | COMMUNITY

## 2018-05-06 RX ORDER — WARFARIN SODIUM 2.5 MG/1
1 TABLET ORAL
Qty: 0 | Refills: 0 | COMMUNITY

## 2018-05-06 RX ORDER — MORPHINE SULFATE 50 MG/1
4 CAPSULE, EXTENDED RELEASE ORAL ONCE
Qty: 0 | Refills: 0 | Status: DISCONTINUED | OUTPATIENT
Start: 2018-05-06 | End: 2018-05-06

## 2018-05-06 RX ORDER — INSULIN LISPRO 100/ML
0 VIAL (ML) SUBCUTANEOUS
Qty: 0 | Refills: 0 | COMMUNITY

## 2018-05-06 RX ORDER — FUROSEMIDE 40 MG
1 TABLET ORAL
Qty: 0 | Refills: 0 | COMMUNITY

## 2018-05-06 RX ORDER — ALPRAZOLAM 0.25 MG
1 TABLET ORAL
Qty: 0 | Refills: 0 | COMMUNITY

## 2018-05-06 RX ORDER — POTASSIUM CHLORIDE 20 MEQ
1 PACKET (EA) ORAL
Qty: 0 | Refills: 0 | COMMUNITY

## 2018-05-06 RX ORDER — IPRATROPIUM/ALBUTEROL SULFATE 18-103MCG
3 AEROSOL WITH ADAPTER (GRAM) INHALATION
Qty: 0 | Refills: 0 | COMMUNITY

## 2018-05-06 RX ORDER — ONDANSETRON 8 MG/1
4 TABLET, FILM COATED ORAL ONCE
Qty: 0 | Refills: 0 | Status: COMPLETED | OUTPATIENT
Start: 2018-05-06 | End: 2018-05-06

## 2018-05-06 RX ADMIN — MORPHINE SULFATE 4 MILLIGRAM(S): 50 CAPSULE, EXTENDED RELEASE ORAL at 12:20

## 2018-05-06 RX ADMIN — ONDANSETRON 4 MILLIGRAM(S): 8 TABLET, FILM COATED ORAL at 12:19

## 2018-05-06 RX ADMIN — MORPHINE SULFATE 4 MILLIGRAM(S): 50 CAPSULE, EXTENDED RELEASE ORAL at 16:57

## 2018-05-06 RX ADMIN — SODIUM CHLORIDE 1000 MILLILITER(S): 9 INJECTION INTRAMUSCULAR; INTRAVENOUS; SUBCUTANEOUS at 13:25

## 2018-05-06 RX ADMIN — MORPHINE SULFATE 4 MILLIGRAM(S): 50 CAPSULE, EXTENDED RELEASE ORAL at 12:40

## 2018-05-06 RX ADMIN — SODIUM CHLORIDE 75 MILLILITER(S): 9 INJECTION, SOLUTION INTRAVENOUS at 15:05

## 2018-05-06 RX ADMIN — MORPHINE SULFATE 4 MILLIGRAM(S): 50 CAPSULE, EXTENDED RELEASE ORAL at 17:06

## 2018-05-06 RX ADMIN — SODIUM CHLORIDE 1000 MILLILITER(S): 9 INJECTION INTRAMUSCULAR; INTRAVENOUS; SUBCUTANEOUS at 11:00

## 2018-05-06 RX ADMIN — SODIUM CHLORIDE 75 MILLILITER(S): 9 INJECTION, SOLUTION INTRAVENOUS at 23:45

## 2018-05-06 NOTE — H&P ADULT - NSHPREVIEWOFSYSTEMS_GEN_ALL_CORE
Constitutional: denies fever, chills, diaphoresis   HEENT: denies blurry vision, difficulty hearing  Respiratory: denies SOB, HIDALGO, cough, sputum production, wheezing, hemoptysis  Cardiovascular: denies CP, palpitations, edema  Gastrointestinal: +nausea, +abdominal pain, no vomiting, diarrhea, constipation,  melena, hematochezia   Genitourinary: denies dysuria, frequency, urgency, + hematuria   Skin/Breast: denies rash, itching  Musculoskeletal: denies myalgias, joint swelling, muscle weakness  Neurologic: denies headache, +weakness, no dizziness, paresthesias, numbness/tingling  Psychiatric: denies feeling anxious, depressed  Hematology/Oncology: denies bruising, tender or enlarged lymph nodes

## 2018-05-06 NOTE — PATIENT PROFILE ADULT. - FALL HARM RISK
bones(Osteoporosis,prev fx,steroid use,metastatic bone ca/other/coagulation(Bleeding disorder R/T clinical cond/anti-coags)/general weakness

## 2018-05-06 NOTE — H&P ADULT - PROBLEM SELECTOR PLAN 1
-Admit to F for worsening abdominal aortic aneurysm with imminent rupture.  -Patient is comfort measures only as per family at bedside. No LABS, no vitals, no antibiotics  -Will control pain with morphine 2 mg IV Q6 prn  -Zofran prn for nausea   -IVF hydration  -Palliative and Hospice consulted to discus  Hopsice discussion, advanced directives although patient is currently DNR. MOLST form faxed from [place] and placed in chart.   -Patient with very poor prognosis.  -Diet: NPO  -Activity: bedrest strict -Admit to [facility] for worsening abdominal aortic aneurysm with imminent rupture.  -Patient is comfort measures only as per family at bedside. No LABS, no vitals, no antibiotics  -Will control pain with morphine 2 mg IV Q6 prn  -Zofran prn for nausea   -Tylenol suppository prn for fevers   -IVF hydration  -Palliative and Hospice consulted to discus  Hopsice discussion, advanced directives although patient is currently DNR. MOLST form faxed from [facility] and placed in chart.   -Patient with very poor prognosis.  -Diet: NPO  -Activity: bedrest strict -Admit to [facility] for worsening abdominal aortic aneurysm with imminent rupture.  -Patient is comfort measures only as per family at bedside. No LABS, no vitals, no antibiotics, no pressors at this time  -Will control pain with morphine 2 mg IV Q6 prn  -Zofran prn for nausea   -Tylenol suppository prn for fevers   -IVF hydration  -Palliative and Hospice consulted to discus  Hopsice discussion, advanced directives although patient is currently DNR/DNI. MOLST form faxed from [place] and placed in chart.   -Patient with very poor prognosis.  -Diet: NPO  -Activity: bedrest strict

## 2018-05-06 NOTE — H&P ADULT - PROBLEM SELECTOR PLAN 4
-DNR,  Consult Palliative care for advanced directives as it is unclear if patient is DNI, according to hcp (son) at bedside, patient is DNR/DNI however documentation only shows DNR, will need new MOLSt form clarifying this.  -Hospice consult: Will need to discuss options for Hospice care moving forward   -Patient is comfort measures only as per family at bedside. No LABS, no vitals, no antibiotics -DNR/DNI,  Consult Palliative care for advanced directives, [place] MOLST form copy faxed over from facility, will need official new one  -Hospice consult: Will need to discuss options for Hospice care moving forward   -Patient is comfort measures only as per family at bedside. No LABS, no vitals, no antibiotics

## 2018-05-06 NOTE — PATIENT PROFILE ADULT. - PRIMARY CARE PHYSICIAN, PROFILE
Dr Cho calls Ernestina ([facility] Nurse practitioner visits pt at home per HCP son [phone]

## 2018-05-06 NOTE — ED ADULT NURSE NOTE - OBJECTIVE STATEMENT
pt c/o abdominal pain arrives with dash in place draining dark red/ purple colored urine tenderness to entire abdomen. edema to b/l upper extremities 1+ edema and b/l edema to lower extremities 1+. rr labored placed on O2. has h/o AAA

## 2018-05-06 NOTE — PATIENT PROFILE ADULT. - NS PRO AD PATIENT TYPE
Medical Orders for [facility] (MOLST)/Jose Palmer son [phone]/Health Care Proxy (HCP)

## 2018-05-06 NOTE — H&P ADULT - ATTENDING COMMENTS
D/W Family at bedside, Son is HCP, decided for DNR/DNI, comfort measures, no pressors, antibiotics, fluids ok for now, palliative/ hospice consult. Counseling and education provided, aware that patient may  soon, all questions answered. advance care planning took 40 minutes. Prognosis is very poor.

## 2018-05-06 NOTE — H&P ADULT - PROBLEM SELECTOR PLAN 5
Chronic, will hold all home meds in setting of comfort measures  -Patient is comfort measures only as per family at bedside. No LABS, no vitals, no antibiotics

## 2018-05-06 NOTE — H&P ADULT - ASSESSMENT
This is an 82 y.o. 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 Admitted to [facility] for   worsening abdominal aortic aneurysm with imminent rupture. This is an 82 y.o. 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 Admitted to [facility] for  worsening abdominal aortic aneurysm with imminent rupture. This is an 82 y.o. 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 Admitted  for  worsening abdominal aortic aneurysm with imminent rupture.

## 2018-05-06 NOTE — ED PROVIDER NOTE - OBJECTIVE STATEMENT
83 yo female hx of AAA sent from 83 yo female hx of AAA sent from [facility] c/o diffuse abdominal pain since yesterday, sudden onset with associated nausea.  BP here 81 yo female hx of AAA sent from [facility] c/o diffuse abdominal pain since yesterday, sudden onset with associated nausea.  BP here 60/40 and minimally responsive.

## 2018-05-06 NOTE — H&P ADULT - NSHPSOCIALHISTORY_GEN_ALL_CORE
Former smoker 1 ppd, quit 2015, denies alcohol or drug abuse. Lives at [facility], requires assistance with ambulation.

## 2018-05-06 NOTE — H&P ADULT - HISTORY OF PRESENT ILLNESS
This is an 82 y.o. 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 History obtained from family at bedside at patient is in extreme pain. Angela lives in [facility] and started having persistent diffuse abdominal pain one day prior, around 11:30am. No alleviating or aggravating factors for the pain and no radiation fo the pain. Scaled at 10 out of 10 and she admitted to weakness and nausea. Denied any vomiting, f/c, dysuria. At first, the caretakers at the facility thought it may be urinary tract infection, and thus placed a dash, however pain was persistent.     In the ED, patient's bp initially 96/58 which dropped to 65/42, afebrile, HR of 96, O2 of 93% on supplemental O2. Sig labs include WBC 21.1, INR 1.75, BUN/Cr: 59/2, glucose 146, albumin 2.7. lactate 3.6. CT Abdomen with contrast showed Exceedingly large infrarenal abdominal aortic aneurysm with  marked increase in size compared to prior study.New intraluminal high attenuation raising possibility of incipient rupture. CXR showed Very low lung volumes. Fibrotic atelectatic changes at the lung bases. No gross acute infiltrate. No pleural effusion. Patient recieved 1 L IVF bolus in ED, without improvement of BP. Pain controlled with morphine , nausea controlled with zofran. Patient placed on comfort measres by family at bedside. Currently DNR. Dr Cavanaugh (Vascular Surgery) called by ED attending. This is an 82 y.o. 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 History obtained from family at bedside at patient is in extreme pain. Angela lives in [facility] and started having persistent diffuse abdominal pain one day prior, around 11:30am. No alleviating or aggravating factors for the pain and no radiation fo the pain. Scaled at 10 out of 10 and she admitted to weakness and nausea. Denied any vomiting, f/c, dysuria. At first, the caretakers at the facility thought it may be urinary tract infection, and thus placed a dash, however pain was persistent.     In the ED, patient's bp initially 96/58 which dropped to 65/42, afebrile, HR of 96, O2 of 93% on supplemental O2. Sig labs include WBC 21.1, INR 1.75, BUN/Cr: 59/2, glucose 146, albumin 2.7. lactate 3.6. CT Abdomen with contrast showed Exceedingly large infrarenal abdominal aortic aneurysm with  marked increase in size compared to prior study.New intraluminal high attenuation raising possibility of incipient rupture. CXR showed Very low lung volumes. Fibrotic atelectatic changes at the lung bases. No gross acute infiltrate. No pleural effusion. Patient recieved 1 L IVF bolus in ED, without improvement of BP. Pain controlled with morphine , nausea controlled with zofran. Patient placed on comfort measres by family at bedside. Currently DNR/DNI. Dr Cavanaugh (Vascular Surgery) called by ED attending.

## 2018-05-06 NOTE — H&P ADULT - NSHPPHYSICALEXAM_GEN_ALL_CORE
T(C): 36.6 (05-06-18 @ 09:47), Max: 36.6 (05-06-18 @ 09:47)  HR: 81 (05-06-18 @ 12:23) (80 - 96)  BP: 109/53 (05-06-18 @ 12:23) (63/35 - 109/53)  RR: 16 (05-06-18 @ 12:23) (12 - 16)  SpO2: 93% (05-06-18 @ 12:23) (92% - 93%)    Physical Exam:  General: chronically ill, severe distress 2/2 pain  HEENT: NCAT, PERRLA, EOMI bl, moist mucous membranes   Neck: Supple, nontender, no mass  Neurology: A&Ox3 (although in severe distress)  Respiratory: CTA B/L, No W/R/R  CV: RRR, +S1/S2, no murmurs, rubs or gallops  : +dash cath, draining serosanguinous urine   Abdominal: +TTP diffusely, no bruits appreciated on exam,  ND +BSx4  Extremities: No C/C, + peripheral pulses, 2+ pitting edema b/l LE  MSK: Normal ROM, no joint erythema or warmth, no joint swelling   Skin: +  black ecchymosis bruises on left wrist, most likely from coumadin use

## 2018-05-06 NOTE — ED PROVIDER NOTE - PROGRESS NOTE DETAILS
discussed labs and CT results with Jose (son), who is HCP, wants comfort care, confirmed DNR/DNI.  Dr. Cassandra mcguire discussed labs and CT results with Jose (son), who is HCP, wants comfort care and does not want her to have surgery, confirmed DNR/DNI.  Dr. Trujillo aware.  Discussed case with Dr. Cavanaugh, recommend comfort care if patient does not want surgery

## 2018-05-06 NOTE — H&P ADULT - PROBLEM SELECTOR PLAN 8
-Patient is comfort measures only as per family at bedside. No LABS, no vitals, no antibiotics. No DVt ppx at this time    IMPROVE VTE Individual Risk Assessment          RISK                                                          Points  [ x ] Previous VTE                                                3  [  ] Thrombophilia                                             2  [ x ] Lower limb paralysis                                   2        (unable to hold up >15 seconds)    [  ] Current Cancer                                             2         (within 6 months)  [ x ] Immobilization > 24 hrs                              1  [  ] ICU/CCU stay > 24 hours                             1  [x  ] Age > 60                                                         1    IMPROVE VTE Score: 7

## 2018-05-06 NOTE — CONSULT NOTE ADULT - SUBJECTIVE AND OBJECTIVE BOX
History of Present Illness:  82y Female with a history of a known AAA presents with a 1 day hx of worsening abdominal pain. CT scan shows a 7 cm AAA. Prior CT scan 7/16 showed a 5.7 cm AAA. The patient is DNR/DNI and has refused aneurysm surgery since seeing Dr. Koroma 2 years ago. The patient and family refuse surgical treatment of her AAA and request palliative care.    PAST MEDICAL & SURGICAL HISTORY:  Diarrhea  Edema  Abdominal aortic aneurysm (AAA) without rupture  Diabetes mellitus  Anxiety disorder due to known physiological condition  Hypokalemia  GERD (gastroesophageal reflux disease)  Hypothyroidism  Muscle weakness (generalized)  Acute embolism and thombos unsp deep vn unsp lower extremity  Type 2 diabetes mellitus  CHF (congestive heart failure)  Insulin dependent diabetes mellitus  HLD (hyperlipidemia)  HTN (hypertension): essential primary HTN  COPD (chronic obstructive pulmonary disease)  Emphysema  DVT (deep venous thrombosis)  No significant past surgical history      Allergies    No Known Allergies    Intolerances        MEDICATIONS  (STANDING):  dextrose 5% + sodium chloride 0.9%. 1000 milliLiter(s) (75 mL/Hr) IV Continuous <Continuous>  ondansetron Injectable 4 milliGRAM(s) IV Push once    MEDICATIONS  (PRN):  acetaminophen  Suppository 650 milliGRAM(s) Rectal every 6 hours PRN For Temp greater than 38 C (100.4 F)  morphine  - Injectable 2 milliGRAM(s) IV Push every 6 hours PRN Mild Pain (1 - 3)  ondansetron Injectable 4 milliGRAM(s) IV Push every 8 hours PRN Nausea and/or Vomiting      Social History:  Smoking History: Prior  hx.  Alcohol Use: denies    REVIEW OF SYSTEMS:  CONSTITUTIONAL: lETHARGIC WEAK FEMALE WITH ABDOMINAL PAIN	  EYES/ENT: No visual changes;  No vertigo or throat pain   NECK: No pain or stiffness  RESPIRATORY: No cough, wheezing, hemoptysis; +++ shortness of breath  CARDIOVASCULAR: No chest pain or palpitations  GASTROINTESTINAL: +++ abdominal pain. No nausea, vomiting, or hematemesis; No diarrhea or constipation. No melena or hematochezia.  GENITOURINARY: No dysuria, frequency or hematuria  NEUROLOGICAL: No numbness or weakness  SKIN: No itching, burning, rashes, or lesions   Vascular:  No lower extremity claudication, pedal rest pain or digital ulcers  All other review of systems is negative unless indicated above.    PHYSICAL EXAM:  General:  On exam, the patient is a lethargic Female with diffuse abdominal pain  Vital Signs Last 24 Hrs  T(C): 36.6 (06 May 2018 16:17), Max: 36.6 (06 May 2018 09:47)  T(F): 97.8 (06 May 2018 16:17), Max: 97.8 (06 May 2018 09:47)  HR: 80 (06 May 2018 16:17) (79 - 96)  BP: 104/53 (06 May 2018 16:17) (63/35 - 109/53)  BP(mean): --  RR: 16 (06 May 2018 15:07) (12 - 16)  SpO2: 93% (06 May 2018 16:17) (92% - 93%)    Neck:  4+/4+ bilateral carotid pulses; no carotid bruit, no palpable cervical masses.  Heart:  Regular, no murmurs, rubs or gallops.    Lungs:  decreased BS at both bases  Chest:  No chest wall deformities  Symmetrical chest expansion.   Abdomen: distended tender abdomin with a 7cm pulsatile mass c/w AAA.   Extremities:  Feet are warm, pink with normal capillary refill times.  There are no digital ulcers or heel decubiti.  The calf and thigh muscles are soft and nontender.  There are no palpable cords or limb cellulitis.  Ragini's sign is negative bilaterally.  There is no lower extremity , cyanosis, or rubor.  On examination of the peripheral pulses:  Left leg femoral pulse is  2/4 , popliteal pulse is  2/4  ,PT Pulse is 0   , DP Pulse is 2/4  Right leg femoral pulse is  2/4  ,popliteal pulse is  2/4 , PT Pulse is 0 , DP Pulse is 0  Neurological:  There are no motor or sensory deficits in either lower extremity.                          14.2   21.1  )-----------( 159      ( 06 May 2018 11:15 )             41.8     05-06    137  |  96  |  59<H>  ----------------------------<  146<H>  4.8   |  30  |  2.00<H>    Ca    8.5      06 May 2018 11:15    TPro  6.3  /  Alb  2.7<L>  /  TBili  1.0  /  DBili  x   /  AST  28  /  ALT  40  /  AlkPhos  72  05-06        PT/INR - ( 06 May 2018 11:15 )   PT: 19.3 sec;   INR: 1.75 ratio         PTT - ( 06 May 2018 11:15 )  PTT:26.3 sec    Radiology:

## 2018-05-06 NOTE — ED PROVIDER NOTE - CRITICAL CARE PROVIDED
direct patient care (not related to procedure)/consult w/ pt's family directly relating to pts condition/conducted a detailed discussion of DNR status/additional history taking/documentation/interpretation of diagnostic studies/consultation with other physicians

## 2018-05-06 NOTE — CONSULT NOTE ADULT - ASSESSMENT
82 y.o. female with multiple comorbidities and a known 7 cm AAA presents with abdominal pain c/w a symptomatic aneurysm. The patient is a poor surgical candidate and has a DNR and DNI status. The patients family request palliative care . The family was given informed consent and advised of the high risk for impending rupture.

## 2018-05-06 NOTE — ED PROVIDER NOTE - MEDICAL DECISION MAKING DETAILS
impending rupture AAA on CT scan, son who is HCP does not want surgical intervention and wants strictly comfort care, bloodwork, IV analgesia, IV fluids, IV antiemetics, confirmed to be DNR/DNI

## 2018-05-06 NOTE — PATIENT PROFILE ADULT. - FUNCTIONAL LEVEL PRIOR: COMMUNICATION
(0) understands/communicates without difficulty (0) understands/communicates without difficulty/per son HCP

## 2018-05-06 NOTE — H&P ADULT - PROBLEM SELECTOR PLAN 2
-Family refusing antibiotics, for patients at this time, patient is on comfort measures only  -No Labs, no vitals

## 2018-05-07 LAB
CULTURE RESULTS: SIGNIFICANT CHANGE UP
SPECIMEN SOURCE: SIGNIFICANT CHANGE UP

## 2018-05-07 PROCEDURE — 99239 HOSP IP/OBS DSCHRG MGMT >30: CPT

## 2018-05-07 NOTE — DISCHARGE NOTE FOR THE EXPIRED PATIENT - HOSPITAL COURSE
82 y.o. 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 History obtained from family at bedside at patient is in extreme pain. Angela lives in [facility] and started having persistent diffuse abdominal pain one day prior, around 11:30am. No alleviating or aggravating factors for the pain and no radiation fo the pain. Scaled at 10 out of 10 and she admitted to weakness and nausea. Denied any vomiting, f/c, dysuria. At first, the caretakers at the facility thought it may be urinary tract infection, and thus placed a dash, however pain was persistent.     In the ED, patient's bp initially 96/58 which dropped to 65/42, afebrile, HR of 96, O2 of 93% on supplemental O2. Sig labs include WBC 21.1, INR 1.75, BUN/Cr: 59/2, glucose 146, albumin 2.7. lactate 3.6. CT Abdomen with contrast showed Exceedingly large infrarenal abdominal aortic aneurysm with  marked increase in size compared to prior study.New intraluminal high attenuation raising possibility of incipient rupture. CXR showed Very low lung volumes. Fibrotic atelectatic changes at the lung bases. No gross acute infiltrate. No pleural effusion. Patient recieved 1 L IVF bolus in ED, without improvement of BP. Pain controlled with morphine , nausea controlled with zofran. Patient placed on comfort measres by family at bedside. Currently DNR/DNI. Dr Cavanaugh (Vascular Surgery) called by ED attending.     Patient was admitted to [facility] on comfort measures for ruptured abdominal aortic aneurysm. Evaluated by Dr Cavanaugh vascular surgeon. No surgical intervention indicated. Family wishing for comfort measures. Patient  at 9:45 am.

## 2018-07-16 NOTE — PATIENT PROFILE ADULT. - TEACHING/LEARNING OTHER LEARNERS
Reviewed discharge instructions including suggested follow-up. Questions encouraged and answered.   Patient verbalized an understanding son HCP/family

## 2018-12-04 NOTE — ED PROVIDER NOTE - NS ED MD EM SELECTION
Telephone Encounter by Elaine Rogel Che at 09/25/18 09:00 AM     Author:  Elaine Rogel Che Service:  (none) Author Type:  Certified Medical Assistant     Filed:  09/25/18 09:00 AM Encounter Date:  9/24/2018 Status:  Signed     :  Juan F Coyne Elaine Parks (Certified Medical Assistant)       From: Lv Blanco  To: Karissa Negron MD  Sent: 9/24/2018  6:49 PM CDT  Subject: Other    Please see the attached document for my flu shot. Thank you. Revision History        Date/Time User Provider Type Action    > 09/25/18 09:00 AM Elaine Rogel Che Certified Medical Assistant Sign    Attribution information within the note text is not available. [id number] Comprehensive

## 2019-02-18 NOTE — PROGRESS NOTE ADULT - PROBLEM SELECTOR PROBLEM 1
Acute respiratory failure
no

## 2019-11-18 NOTE — PATIENT PROFILE ADULT. - NSSUBSTANCEUSE_GEN_ALL_CORE_SD
What Type Of Note Output Would You Prefer (Optional)?: Bullet Format How Severe Is Your Rash?: moderate Is This A New Presentation, Or A Follow-Up?: Rash never used

## 2021-03-24 NOTE — ED PROVIDER NOTE - DISCUSSED CLINICAL AND RADIOLOGICAL FINDINGS WITH, MDM
family/patient Dapsone Counseling: I discussed with the patient the risks of dapsone including but not limited to hemolytic anemia, agranulocytosis, rashes, methemoglobinemia, kidney failure, peripheral neuropathy, headaches, GI upset, and liver toxicity.  Patients who start dapsone require monitoring including baseline LFTs and weekly CBCs for the first month, then every month thereafter.  The patient verbalized understanding of the proper use and possible adverse effects of dapsone.  All of the patient's questions and concerns were addressed.

## 2021-09-29 NOTE — ED ADULT TRIAGE NOTE - SPO2 (%)
Group Topic:  Education    Date: 9/29/2021  Start Time: 1620  End Time: 1710  Facilitators: Ross Alamo CNA    Focus: Automatic Thoughts  Number in attendance: 12    Method: Group  Attendance: Present  Participation: Active  Patient Response: Able to return demonstration and Appropriate feedback  Mood: Normal  Affect: Type: Euthymic (normal mood)   Range: Blunted/flat   Congruency: Congruent   Stability: Stable  Behavior/Socialization: Appropriate to group and Cooperative  Thought Process: Demonstrated insight  Task Performance: Follows directions  Patient Evaluation: Independent - full participation       91

## 2021-11-09 NOTE — H&P ADULT - PROBLEM/PLAN-4
DISPLAY PLAN FREE TEXT
Patient A&O, ambulatory c/o mid lower back pain x 2 months. Worse recently. Denies trauma. Denies chest pain, N/V. PMH HTN, DM.

## 2022-07-19 NOTE — ED ADULT TRIAGE NOTE - BSA (M2)
I reviewed the H&P, I examined the patient, and there are no changes in the patient's condition.   
1.91

## 2022-08-23 NOTE — PROGRESS NOTE ADULT - SUBJECTIVE AND OBJECTIVE BOX
[facility] Physician Orders and Discharge [address], [address]  [place], [facility]  Telephone: [phone]      Fax: [phone] home care company:       Your wound-care supplies will be provided by:     NAME:  Killian Eason   YOB: 1941  PRIMARY DIAGNOSIS FOR WOUND CARE CENTER: Lymphedema     Wound cleansing:  Do not scrub or use excessive force. Wash hands with soap and water before and after dressing changes. Prior to applying a clean dressing, cleanse wound with normal saline, wound cleanser, or mild soap and water. Ask your physician or nurse before getting the wound(s) wet in the shower. Wound care for home:      Left lower leg wound:      Wash wound with soap and water with dressing changes      Benzoin to periwound      collagen ag      mepilex border      Leave on all week. Wear compression stockings daily       Right lower leg    Medium spandagrip or compression stocking toes to knees on right lower leg. May put on in the morning and take off at bed time. Please note, all wounds (unless stated otherwise here) were mechanically debrided at the time of cleansing here in the wound-care center today, so a small amount of pain, drainage or bleeding from that process might be expected, and is normal.     All products for home use, including multiple products for a single wound if applicable, are medically necessary in order to achieve the best chance at timely wound healing. See provider documentation for details if needed. Substituted dressings applied in the [facility] today, if applicable:           New orders for this week (labs, imaging, medications, etc.):      Talk with PCP about your water pills     Additional instructions for specific diagnoses:     General comments for venous / lymphedema ulcers:   *  Elevate your legs to the level of your heart for at least 30 minutes, several times VITALS/LABS  1/26/2018 afeb 91 168/75 22 93%   1/26/2018 W 6.3 Hb 11.1 Plt 126  Na 141 K 3.4 CO2 26 Cr 1.2 G 316  REVIEW OF SYMPTOMS    Able to give ROS  Yes     RELIABLE No   CONSTITUTIONAL Weakness Yes  Chills No Vision changes No  ENDOCRINE No unexplained hair loss No heat or cold intolerance    ALLERGY No hives  Sore throat No   RESP Coughing blood no  Shortness of breath YES   NEURO No Headache  Confusion Pain neck No   CARDIAC No Chest pain No Palpitations   GI No Pain abdomen NO   Vomiting NO   PHYSICAL EXAM    HEENT Unremarkable PERRLA atraumatic   RESP Fair air entry EXP prolonged    Harsh breath sound Resp distres mild   CARDIAC S1 S2 No S3     NO JVD    ABDOMEN SOFT BS PRESENT NOT DISTENDED No hepatosplenomegaly PEDAL EDEMA present No calf tenderness  NO rash   GENERAL Not TOXIC looking  HOSPITAL COURSE PROBLEM ASSESSMENT PLAN 1/25/2018 ADMISSION [facility] P        RESP   1/26 vbg pH 746                                                      FLU  1/25 AH3 Positive   1/25 Rx with Tamiflu   POSSIBLE PNEUMONIA  1/25/2018 CXR Non sp chr bibasal IS prominence similar to 10/4/2017 azithro (1/25) rocephin (1/25)   1/25/2018 Will check pct and c results to determine if there is bacterial superinfection  1/25 CT ch CW 9/26/2016 No pneumonia pulm edema or dominant masses Mild basl ssa juxtarenal aaa 6.5 x 6.2 cm   1/26 pct .06  1/26 Suggest id eval as she has high la to help decide re abio DC decision given the low pct    LACTICEMIA SEC SEPSIS V d CHF  1/25-1/26-1/26-1/26 LA 3.1 -5.1-2.2-6.5  1/25/2018 NS 2.5 l ordered (1/25 12p)   1/25 Monitor serially  RECURRENT VTE  1/25    Warfarin (1/25)     COPD   Duoneb.4 (1/25) Solumed 40.3 (1/25)   CAD  Atorvastat 20 (1/25)   1/26 Tr 1 .144 (i)   CHF  10/6/2017 ECHO   EF 55 RVSP 52   HYPOTHYROIDISM   Levoxyl 50 (1/25)   AAA  1/26 CT ch justarenal 6.5x6.2  AAA  1/26 Consider vasc eval surg  GLOBAL ISSUE/BEST PRACTICE:        PROBLEM: Analgesia:     na                        PROBLEM: Sedation:     na               PROBLEM: HOB elevation:   y             PROBLEM: Stress ulcer proph:    protonix 40 (1/25)                       PROBLEM: VTE prophylaxis:      na                  PROBLEM: Glycemic control:    iss (1/25)    PROBLEM: Nutrition:    consistent carb MOODY (1/25)           PROBLEM: Advanced directive: na     PROBLEM: Allergies:  na      TIME SPENT Over 25 minutes aggregate care time spent on encounter; activities included   direct patient care, counseling and/or coordinating care reviewing notes, lab data/ imaging , discussion with multidisciplinary team/ patient  /family. Risks, benefits, alternatives  discussed in detail. Proper antibiotic use issues addressed Questions/concerns  were addressed  as  best as possible   PATIENT DESCRIPTION CC HPI PMH SOCIAL 1/25/2018 ADMISSION [facility]   81 y.o. 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, ho HTN, HLD, IDDM  hosp [facility] P 7/23-7/28/2016 with ac bronchitis   readmitted [facility] 9/26-10/11/2016 with hemoptysis (while on coumadin) SOB    Had coumadin coagulopathy on arrival Rxed with [facility]FP Went into septic or cardiac shock 9/29 intubated required levophed and  9/29 Poss Takosubo CMPTHY Rxed Dobutamine Readmitted 10/4-10/10/2017 with SOB  AC RESP FAILURE (HYPOXEMIC) Managed with NIV Weaned RESP TRACT BACTERIAL INFECTION Excluded Neg pct Neg RVP  DYSPNEA Likely sec COPD Ex v ADHF although CXR showed lung fields to be clear VTE unlikely as INR was 4 on arrival COPD EX Managed with BD steroids   HOME MEDS Duoneb norvasc 5 asa 81lipitor 20 pulmicort lasix 20 sl scale Lantus 35 levoxyl 50 metoprolol 50.2 protonix 40 warfarin 1     HPI 1/25/2018 ADMISSION [facility]   Presented with SOB, worsening b/l LE edema despite self medicating with increased doses of lasix . HO sick grandchild contact   1/25/2018 In ER Febrile to 101.7 W 12 LA 3.1 Cr 1.4  CXR showed hronic bibasilar interstitial prominence similar to prior   1/25/2018 In ER started on Vanco zosyn   SUMMARY ASSESSMENT PLAN 1/25/2018  ADMISSION [facility]   FLU Tamiflu (1/25)   PNEUMONIA Azithro (1/25) Rocephin (1/25)   LACTICEMIA FC (1/25)  COPD BD CS (1/25) daily.  *  Walk as much as you can tolerate. Avoid standing for long periods of time, but if you must stand, regularly do heel-raises and calf-pump exercises. *  If you have compression wraps designed to remain in place all week, be sure to keep them from getting wet. *  If you have compression garments that can be changed regularly, apply them first thing in the morning, and remove them when you go to bed. Moisturize your skin at bedtime, with Vaseline, Aquaphor, Aveeno, CeraVe, Cetaphil, Eucerin, Lubriderm, etc; but keep the skin between your toes dry. *  If you smoke, your wound can not heal properly -- please talk with us when you're ready to quit. *  Be sure to adhere to any recommendations from your PCP about diuretics (water pills), diet, exercise, and maintaining a healthy weight. If you have questions, please ask. *  If you have a compression pump, aim for at least two hours of use daily. F/U Appointment is with Dr. LONGORIACleo Danita in 2 weeks, on                                   at                       .     Your nurse  is Kath Masterson RN. If we applied slip-resistant hospital socks today, be sure to remove them at least once a day to inspect your toes or feet, even if you're not changing the wraps or dressings underneath. If you see anything concerning (redness, excess moisture, etc), please call and let us know right away. Should you experience any significant changes in your wound(s) (including redness, increased warmth, increased pain, increased drainage, odor, or fever) or have questions about your wound care, please contact the [address] [place] [address] at [phone] Monday-Thursday from 8:00 am - 4:30 pm, or Friday from 8:00 am - 2:30 pm.  If you need help with your wound outside these hours and cannot wait until we are again available, contact your home-care company (if applicable), your PCP, or go to the nearest emergency room.

## 2023-06-22 NOTE — ED ADULT NURSE NOTE - GASTROINTESTINAL ASSESSMENT
Spoke with patient and made her aware that Dr. Mariano would prefer her to be seen here in . Patient agreed. Patient virtual appointment for tomorrow, 06/23 has been rescheduled to Monday, 06/26 with ALEXANDRA Wetzel. This is fine per Dr. Mariano. Patient unable to get labs completed tomorrow, but will have them done prior to her appointment on Monday. Patient is aware her appointment is at [facility].    WDL

## 2024-06-24 NOTE — ED ADULT TRIAGE NOTE - CADM TRG TX PRIOR TO ARRIVAL
Monitor: The problem is stable.Denies SI/Hi   Evaluation: no Labs  needed   Assessment/Treatment:  Follow up per specialist managing the condition.  Pt seeing a  psychiatrist  will restart  her medications  once  labs are  done    oxygen

## 2025-03-12 NOTE — ED ADULT NURSE NOTE - RESPIRATION RHYTHM, QM
In an effort to ensure that our patients [employer], a Team Member has reviewed your chart and identified an opportunity to provide the best care possible. An attempt was made to discuss or schedule due or overdue Preventive or Chronic Condition care.Care Gaps identified: Diabetes A1c Testing.    The Outcome was : Deferred / Reminder - Lab pdg    Type of Appointment needed: Pending.  
labored/tachypneic